# Patient Record
Sex: MALE | Race: WHITE | NOT HISPANIC OR LATINO | URBAN - METROPOLITAN AREA
[De-identification: names, ages, dates, MRNs, and addresses within clinical notes are randomized per-mention and may not be internally consistent; named-entity substitution may affect disease eponyms.]

---

## 2018-05-11 ENCOUNTER — EMERGENCY (EMERGENCY)
Facility: HOSPITAL | Age: 53
LOS: 0 days | Discharge: HOME | End: 2018-05-11
Attending: EMERGENCY MEDICINE | Admitting: EMERGENCY MEDICINE

## 2018-05-11 VITALS
HEART RATE: 52 BPM | OXYGEN SATURATION: 98 % | RESPIRATION RATE: 20 BRPM | SYSTOLIC BLOOD PRESSURE: 125 MMHG | TEMPERATURE: 97 F | DIASTOLIC BLOOD PRESSURE: 77 MMHG

## 2018-05-11 VITALS
SYSTOLIC BLOOD PRESSURE: 107 MMHG | RESPIRATION RATE: 18 BRPM | DIASTOLIC BLOOD PRESSURE: 75 MMHG | OXYGEN SATURATION: 100 % | HEART RATE: 78 BPM

## 2018-05-11 DIAGNOSIS — R07.9 CHEST PAIN, UNSPECIFIED: ICD-10-CM

## 2018-05-11 DIAGNOSIS — Z88.0 ALLERGY STATUS TO PENICILLIN: ICD-10-CM

## 2018-05-11 DIAGNOSIS — F17.200 NICOTINE DEPENDENCE, UNSPECIFIED, UNCOMPLICATED: ICD-10-CM

## 2018-05-11 DIAGNOSIS — Z98.890 OTHER SPECIFIED POSTPROCEDURAL STATES: ICD-10-CM

## 2018-05-11 DIAGNOSIS — Z98.890 OTHER SPECIFIED POSTPROCEDURAL STATES: Chronic | ICD-10-CM

## 2018-05-11 DIAGNOSIS — I48.91 UNSPECIFIED ATRIAL FIBRILLATION: ICD-10-CM

## 2018-05-11 LAB
ALBUMIN SERPL ELPH-MCNC: 4.8 G/DL — SIGNIFICANT CHANGE UP (ref 3.5–5.2)
ALP SERPL-CCNC: 63 U/L — SIGNIFICANT CHANGE UP (ref 30–115)
ALT FLD-CCNC: 18 U/L — SIGNIFICANT CHANGE UP (ref 0–41)
ANION GAP SERPL CALC-SCNC: 15 MMOL/L — HIGH (ref 7–14)
APTT BLD: 25.3 SEC — LOW (ref 27–39.2)
AST SERPL-CCNC: 16 U/L — SIGNIFICANT CHANGE UP (ref 0–41)
BASOPHILS # BLD AUTO: 0.03 K/UL — SIGNIFICANT CHANGE UP (ref 0–0.2)
BASOPHILS NFR BLD AUTO: 0.2 % — SIGNIFICANT CHANGE UP (ref 0–1)
BILIRUB SERPL-MCNC: 0.6 MG/DL — SIGNIFICANT CHANGE UP (ref 0.2–1.2)
BUN SERPL-MCNC: 14 MG/DL — SIGNIFICANT CHANGE UP (ref 10–20)
CALCIUM SERPL-MCNC: 9.9 MG/DL — SIGNIFICANT CHANGE UP (ref 8.5–10.1)
CHLORIDE SERPL-SCNC: 104 MMOL/L — SIGNIFICANT CHANGE UP (ref 98–110)
CK MB CFR SERPL CALC: 8 NG/ML — HIGH (ref 0.6–6.3)
CK SERPL-CCNC: 79 U/L — SIGNIFICANT CHANGE UP (ref 0–225)
CO2 SERPL-SCNC: 24 MMOL/L — SIGNIFICANT CHANGE UP (ref 17–32)
CREAT SERPL-MCNC: 0.9 MG/DL — SIGNIFICANT CHANGE UP (ref 0.7–1.5)
EOSINOPHIL # BLD AUTO: 0.05 K/UL — SIGNIFICANT CHANGE UP (ref 0–0.7)
EOSINOPHIL NFR BLD AUTO: 0.3 % — SIGNIFICANT CHANGE UP (ref 0–8)
GLUCOSE SERPL-MCNC: 113 MG/DL — HIGH (ref 70–99)
HCT VFR BLD CALC: 40 % — LOW (ref 42–52)
HGB BLD-MCNC: 13.4 G/DL — LOW (ref 14–18)
IMM GRANULOCYTES NFR BLD AUTO: 0.8 % — HIGH (ref 0.1–0.3)
INR BLD: 1.06 RATIO — SIGNIFICANT CHANGE UP (ref 0.65–1.3)
LYMPHOCYTES # BLD AUTO: 25.8 % — SIGNIFICANT CHANGE UP (ref 20.5–51.1)
LYMPHOCYTES # BLD AUTO: 3.83 K/UL — HIGH (ref 1.2–3.4)
MAGNESIUM SERPL-MCNC: 2 MG/DL — SIGNIFICANT CHANGE UP (ref 1.8–2.4)
MCHC RBC-ENTMCNC: 31 PG — SIGNIFICANT CHANGE UP (ref 27–31)
MCHC RBC-ENTMCNC: 33.5 G/DL — SIGNIFICANT CHANGE UP (ref 32–37)
MCV RBC AUTO: 92.6 FL — SIGNIFICANT CHANGE UP (ref 80–94)
MONOCYTES # BLD AUTO: 1.65 K/UL — HIGH (ref 0.1–0.6)
MONOCYTES NFR BLD AUTO: 11.1 % — HIGH (ref 1.7–9.3)
NEUTROPHILS # BLD AUTO: 9.14 K/UL — HIGH (ref 1.4–6.5)
NEUTROPHILS NFR BLD AUTO: 61.8 % — SIGNIFICANT CHANGE UP (ref 42.2–75.2)
PLATELET # BLD AUTO: 289 K/UL — SIGNIFICANT CHANGE UP (ref 130–400)
POTASSIUM SERPL-MCNC: 4.7 MMOL/L — SIGNIFICANT CHANGE UP (ref 3.5–5)
POTASSIUM SERPL-SCNC: 4.7 MMOL/L — SIGNIFICANT CHANGE UP (ref 3.5–5)
PROT SERPL-MCNC: 7.3 G/DL — SIGNIFICANT CHANGE UP (ref 6–8)
PROTHROM AB SERPL-ACNC: 11.5 SEC — SIGNIFICANT CHANGE UP (ref 9.95–12.87)
RBC # BLD: 4.32 M/UL — LOW (ref 4.7–6.1)
RBC # FLD: 14.4 % — SIGNIFICANT CHANGE UP (ref 11.5–14.5)
SODIUM SERPL-SCNC: 143 MMOL/L — SIGNIFICANT CHANGE UP (ref 135–146)
TROPONIN T SERPL-MCNC: <0.01 NG/ML — SIGNIFICANT CHANGE UP
WBC # BLD: 14.82 K/UL — HIGH (ref 4.8–10.8)
WBC # FLD AUTO: 14.82 K/UL — HIGH (ref 4.8–10.8)

## 2018-05-11 RX ORDER — ETOMIDATE 2 MG/ML
10 INJECTION INTRAVENOUS ONCE
Qty: 0 | Refills: 0 | Status: DISCONTINUED | OUTPATIENT
Start: 2018-05-11 | End: 2018-05-11

## 2018-05-11 RX ORDER — ETOMIDATE 2 MG/ML
14 INJECTION INTRAVENOUS ONCE
Qty: 0 | Refills: 0 | Status: DISCONTINUED | OUTPATIENT
Start: 2018-05-11 | End: 2018-05-11

## 2018-05-11 NOTE — ED PROCEDURE NOTE - NS_POSTPROCCAREGUIDE_ED_ALL_ED
Patient is now fully awake, with vital signs and temperature stable, hydration is adequate, patients Odilia’s  score is at baseline (or greater than 8), patient and escort has received  discharge education.

## 2018-05-11 NOTE — ED PROVIDER NOTE - NS ED ROS FT
Constitutional: No fever, chills.  Eyes: No visual changes.  ENT: No hearing changes. No sore throat.  Neck: No neck pain or stiffness.  Cardiovascular: No chest pain, edema. + palpitations.  Pulmonary: No SOB, cough. No hemoptysis.  Abdominal: No abdominal pain, nausea, vomiting, diarrhea.  : No dysuria, frequency.  Neuro: No headache, syncope, dizziness.  MS: No back pain. No calf pain/swelling.  Psych: No suicidal ideations.

## 2018-05-11 NOTE — ED PROVIDER NOTE - OBJECTIVE STATEMENT
Pt is a 54 y/o male with hx of afib in the past, s/p cardioversion x2, ablation in 2014 in [facility], presents to ED for palpitations that started 2 hours PTA. Pt states was walking down stairs when he felt heart rate became irregular, fast. No chest pain, SOB, dizziness, leg pain/swelling.

## 2018-05-11 NOTE — ED PROVIDER NOTE - ATTENDING CONTRIBUTION TO CARE
54 yo male h/o a.fibrillation s/p cardioversion  a few times in the past, s/p ablation about 4 years ago c/o rapid heart rate this morning,. Acute onset while carrying his dog, no associated symptoms.  Well-appearing, NAD, exam remarkable only for irregular H rate.  Will keep NPO, check electrolytes and likley cardiovert.  Patient is amenable with the plan.

## 2018-05-11 NOTE — ED PROVIDER NOTE - PROGRESS NOTE DETAILS
Case endorsed to Dr Griffin. Plan is labs, electrolytes, cardiovert. pt successfully cardioverted, HR 80s, sinus, /70, awake and alert, CHADSVASC 0, will d/c after perid of observation to f/u with his cardiologist Pt back to baseline. Ambulatory around ED. Will discharge with cards f/up.

## 2018-05-11 NOTE — ED ADULT NURSE REASSESSMENT NOTE - NS ED NURSE REASSESS COMMENT FT1
pt on continuous cardiac monitoring. A&O x 3. No s/sx of distress. Pacer pads in place. Will continue to monitor and assess.

## 2018-05-11 NOTE — ED PROVIDER NOTE - PHYSICAL EXAMINATION
Constitutional: Well developed, well nourished. NAD.  Head: Normocephalic, atraumatic.  Eyes: PERRL. EOMI.  ENT: No nasal discharge. Mucous membranes moist.  Neck: Supple. Painless ROM.  Cardiovascular: Normal S1, S2. Tachycardic, irregularly irregular. No murmurs, rubs, or gallops.  Pulmonary: Normal respiratory rate and effort. Lungs clear to auscultation bilaterally. No wheezing, rales, or rhonchi.  Abdominal: Soft. Nondistended. Nontender. No rebound, guarding, rigidity.  Extremities. Pelvis stable. No lower extremity edema, symmetric calves.  Skin: No rashes, cyanosis.  Neuro: AAOx3. No focal neurological deficits.  Psych: Normal mood. Normal affect.

## 2018-08-01 ENCOUNTER — INPATIENT (INPATIENT)
Facility: HOSPITAL | Age: 53
LOS: 11 days | Discharge: HOME | End: 2018-08-13
Attending: THORACIC SURGERY (CARDIOTHORACIC VASCULAR SURGERY) | Admitting: THORACIC SURGERY (CARDIOTHORACIC VASCULAR SURGERY)
Payer: COMMERCIAL

## 2018-08-01 VITALS
RESPIRATION RATE: 18 BRPM | OXYGEN SATURATION: 99 % | HEIGHT: 74 IN | HEART RATE: 84 BPM | WEIGHT: 210.1 LBS | TEMPERATURE: 97 F | SYSTOLIC BLOOD PRESSURE: 113 MMHG | DIASTOLIC BLOOD PRESSURE: 75 MMHG

## 2018-08-01 DIAGNOSIS — Z98.890 OTHER SPECIFIED POSTPROCEDURAL STATES: Chronic | ICD-10-CM

## 2018-08-01 DIAGNOSIS — N17.9 ACUTE KIDNEY FAILURE, UNSPECIFIED: ICD-10-CM

## 2018-08-01 DIAGNOSIS — I42.9 CARDIOMYOPATHY, UNSPECIFIED: ICD-10-CM

## 2018-08-01 DIAGNOSIS — F17.210 NICOTINE DEPENDENCE, CIGARETTES, UNCOMPLICATED: ICD-10-CM

## 2018-08-01 DIAGNOSIS — I11.0 HYPERTENSIVE HEART DISEASE WITH HEART FAILURE: ICD-10-CM

## 2018-08-01 DIAGNOSIS — R07.89 OTHER CHEST PAIN: ICD-10-CM

## 2018-08-01 DIAGNOSIS — Z82.49 FAMILY HISTORY OF ISCHEMIC HEART DISEASE AND OTHER DISEASES OF THE CIRCULATORY SYSTEM: ICD-10-CM

## 2018-08-01 DIAGNOSIS — Z88.0 ALLERGY STATUS TO PENICILLIN: ICD-10-CM

## 2018-08-01 DIAGNOSIS — E87.70 FLUID OVERLOAD, UNSPECIFIED: ICD-10-CM

## 2018-08-01 DIAGNOSIS — I48.0 PAROXYSMAL ATRIAL FIBRILLATION: ICD-10-CM

## 2018-08-01 DIAGNOSIS — R09.02 HYPOXEMIA: ICD-10-CM

## 2018-08-01 DIAGNOSIS — J44.9 CHRONIC OBSTRUCTIVE PULMONARY DISEASE, UNSPECIFIED: ICD-10-CM

## 2018-08-01 DIAGNOSIS — I08.2 RHEUMATIC DISORDERS OF BOTH AORTIC AND TRICUSPID VALVES: ICD-10-CM

## 2018-08-01 DIAGNOSIS — I27.20 PULMONARY HYPERTENSION, UNSPECIFIED: ICD-10-CM

## 2018-08-01 DIAGNOSIS — I50.23 ACUTE ON CHRONIC SYSTOLIC (CONGESTIVE) HEART FAILURE: ICD-10-CM

## 2018-08-01 DIAGNOSIS — E11.9 TYPE 2 DIABETES MELLITUS WITHOUT COMPLICATIONS: ICD-10-CM

## 2018-08-01 DIAGNOSIS — D62 ACUTE POSTHEMORRHAGIC ANEMIA: ICD-10-CM

## 2018-08-01 PROBLEM — I48.91 UNSPECIFIED ATRIAL FIBRILLATION: Chronic | Status: ACTIVE | Noted: 2018-05-11

## 2018-08-01 LAB
ALBUMIN SERPL ELPH-MCNC: 4 G/DL — SIGNIFICANT CHANGE UP (ref 3.5–5.2)
ALP SERPL-CCNC: 67 U/L — SIGNIFICANT CHANGE UP (ref 30–115)
ALT FLD-CCNC: 34 U/L — SIGNIFICANT CHANGE UP (ref 0–41)
ANION GAP SERPL CALC-SCNC: 15 MMOL/L — HIGH (ref 7–14)
APTT BLD: 30.2 SEC — SIGNIFICANT CHANGE UP (ref 27–39.2)
AST SERPL-CCNC: 22 U/L — SIGNIFICANT CHANGE UP (ref 0–41)
BASOPHILS # BLD AUTO: 0.03 K/UL — SIGNIFICANT CHANGE UP (ref 0–0.2)
BASOPHILS NFR BLD AUTO: 0.3 % — SIGNIFICANT CHANGE UP (ref 0–1)
BILIRUB SERPL-MCNC: 1.4 MG/DL — HIGH (ref 0.2–1.2)
BLD GP AB SCN SERPL QL: SIGNIFICANT CHANGE UP
BUN SERPL-MCNC: 11 MG/DL — SIGNIFICANT CHANGE UP (ref 10–20)
CALCIUM SERPL-MCNC: 8.9 MG/DL — SIGNIFICANT CHANGE UP (ref 8.5–10.1)
CHLORIDE SERPL-SCNC: 104 MMOL/L — SIGNIFICANT CHANGE UP (ref 98–110)
CK MB CFR SERPL CALC: 3.8 NG/ML — SIGNIFICANT CHANGE UP (ref 0.6–6.3)
CK SERPL-CCNC: 62 U/L — SIGNIFICANT CHANGE UP (ref 0–225)
CK SERPL-CCNC: 67 U/L — SIGNIFICANT CHANGE UP (ref 0–225)
CO2 SERPL-SCNC: 20 MMOL/L — SIGNIFICANT CHANGE UP (ref 17–32)
CREAT SERPL-MCNC: 0.9 MG/DL — SIGNIFICANT CHANGE UP (ref 0.7–1.5)
EOSINOPHIL # BLD AUTO: 0.15 K/UL — SIGNIFICANT CHANGE UP (ref 0–0.7)
EOSINOPHIL NFR BLD AUTO: 1.5 % — SIGNIFICANT CHANGE UP (ref 0–8)
GLUCOSE SERPL-MCNC: 98 MG/DL — SIGNIFICANT CHANGE UP (ref 70–99)
HCT VFR BLD CALC: 39.4 % — LOW (ref 42–52)
HGB BLD-MCNC: 13.1 G/DL — LOW (ref 14–18)
IMM GRANULOCYTES NFR BLD AUTO: 0.4 % — HIGH (ref 0.1–0.3)
INR BLD: 1.3 RATIO — SIGNIFICANT CHANGE UP (ref 0.65–1.3)
LACTATE BLDV-MCNC: 1 MMOL/L — SIGNIFICANT CHANGE UP (ref 0.5–1.6)
LYMPHOCYTES # BLD AUTO: 2.49 K/UL — SIGNIFICANT CHANGE UP (ref 1.2–3.4)
LYMPHOCYTES # BLD AUTO: 24.3 % — SIGNIFICANT CHANGE UP (ref 20.5–51.1)
MAGNESIUM SERPL-MCNC: 1.9 MG/DL — SIGNIFICANT CHANGE UP (ref 1.8–2.4)
MCHC RBC-ENTMCNC: 31 PG — SIGNIFICANT CHANGE UP (ref 27–31)
MCHC RBC-ENTMCNC: 33.2 G/DL — SIGNIFICANT CHANGE UP (ref 32–37)
MCV RBC AUTO: 93.4 FL — SIGNIFICANT CHANGE UP (ref 80–94)
MONOCYTES # BLD AUTO: 1.08 K/UL — HIGH (ref 0.1–0.6)
MONOCYTES NFR BLD AUTO: 10.6 % — HIGH (ref 1.7–9.3)
NEUTROPHILS # BLD AUTO: 6.44 K/UL — SIGNIFICANT CHANGE UP (ref 1.4–6.5)
NEUTROPHILS NFR BLD AUTO: 62.9 % — SIGNIFICANT CHANGE UP (ref 42.2–75.2)
NRBC # BLD: 0 /100 WBCS — SIGNIFICANT CHANGE UP (ref 0–0)
PLATELET # BLD AUTO: 249 K/UL — SIGNIFICANT CHANGE UP (ref 130–400)
POTASSIUM SERPL-MCNC: 4.3 MMOL/L — SIGNIFICANT CHANGE UP (ref 3.5–5)
POTASSIUM SERPL-SCNC: 4.3 MMOL/L — SIGNIFICANT CHANGE UP (ref 3.5–5)
PROT SERPL-MCNC: 6.4 G/DL — SIGNIFICANT CHANGE UP (ref 6–8)
PROTHROM AB SERPL-ACNC: 14 SEC — HIGH (ref 9.95–12.87)
RBC # BLD: 4.22 M/UL — LOW (ref 4.7–6.1)
RBC # FLD: 13.7 % — SIGNIFICANT CHANGE UP (ref 11.5–14.5)
SODIUM SERPL-SCNC: 139 MMOL/L — SIGNIFICANT CHANGE UP (ref 135–146)
TROPONIN T SERPL-MCNC: 0.05 NG/ML — CRITICAL HIGH
TROPONIN T SERPL-MCNC: 0.05 NG/ML — CRITICAL HIGH
TYPE + AB SCN PNL BLD: SIGNIFICANT CHANGE UP
WBC # BLD: 10.23 K/UL — SIGNIFICANT CHANGE UP (ref 4.8–10.8)
WBC # FLD AUTO: 10.23 K/UL — SIGNIFICANT CHANGE UP (ref 4.8–10.8)

## 2018-08-01 RX ORDER — METOPROLOL TARTRATE 50 MG
12.5 TABLET ORAL EVERY 12 HOURS
Qty: 0 | Refills: 0 | Status: DISCONTINUED | OUTPATIENT
Start: 2018-08-01 | End: 2018-08-05

## 2018-08-01 RX ORDER — HEPARIN SODIUM 5000 [USP'U]/ML
1000 INJECTION INTRAVENOUS; SUBCUTANEOUS
Qty: 25000 | Refills: 0 | Status: DISCONTINUED | OUTPATIENT
Start: 2018-08-01 | End: 2018-08-02

## 2018-08-01 RX ORDER — CLOPIDOGREL BISULFATE 75 MG/1
75 TABLET, FILM COATED ORAL DAILY
Qty: 0 | Refills: 0 | Status: DISCONTINUED | OUTPATIENT
Start: 2018-08-01 | End: 2018-08-02

## 2018-08-01 RX ORDER — ATORVASTATIN CALCIUM 80 MG/1
80 TABLET, FILM COATED ORAL ONCE
Qty: 0 | Refills: 0 | Status: COMPLETED | OUTPATIENT
Start: 2018-08-01 | End: 2018-08-01

## 2018-08-01 RX ORDER — ATORVASTATIN CALCIUM 80 MG/1
80 TABLET, FILM COATED ORAL AT BEDTIME
Qty: 0 | Refills: 0 | Status: DISCONTINUED | OUTPATIENT
Start: 2018-08-01 | End: 2018-08-08

## 2018-08-01 RX ORDER — ASPIRIN/CALCIUM CARB/MAGNESIUM 324 MG
324 TABLET ORAL ONCE
Qty: 0 | Refills: 0 | Status: COMPLETED | OUTPATIENT
Start: 2018-08-01 | End: 2018-08-01

## 2018-08-01 RX ORDER — HEPARIN SODIUM 5000 [USP'U]/ML
INJECTION INTRAVENOUS; SUBCUTANEOUS
Qty: 25000 | Refills: 0 | Status: DISCONTINUED | OUTPATIENT
Start: 2018-08-01 | End: 2018-08-01

## 2018-08-01 RX ORDER — CLOPIDOGREL BISULFATE 75 MG/1
300 TABLET, FILM COATED ORAL ONCE
Qty: 0 | Refills: 0 | Status: COMPLETED | OUTPATIENT
Start: 2018-08-01 | End: 2018-08-01

## 2018-08-01 RX ORDER — HEPARIN SODIUM 5000 [USP'U]/ML
5000 INJECTION INTRAVENOUS; SUBCUTANEOUS ONCE
Qty: 0 | Refills: 0 | Status: COMPLETED | OUTPATIENT
Start: 2018-08-01 | End: 2018-08-01

## 2018-08-01 RX ORDER — ASPIRIN/CALCIUM CARB/MAGNESIUM 324 MG
81 TABLET ORAL DAILY
Qty: 0 | Refills: 0 | Status: DISCONTINUED | OUTPATIENT
Start: 2018-08-01 | End: 2018-08-08

## 2018-08-01 RX ADMIN — ATORVASTATIN CALCIUM 80 MILLIGRAM(S): 80 TABLET, FILM COATED ORAL at 21:35

## 2018-08-01 RX ADMIN — HEPARIN SODIUM 10 UNIT(S)/HR: 5000 INJECTION INTRAVENOUS; SUBCUTANEOUS at 21:37

## 2018-08-01 RX ADMIN — HEPARIN SODIUM 1000 UNIT(S)/HR: 5000 INJECTION INTRAVENOUS; SUBCUTANEOUS at 16:24

## 2018-08-01 RX ADMIN — HEPARIN SODIUM 5000 UNIT(S): 5000 INJECTION INTRAVENOUS; SUBCUTANEOUS at 16:24

## 2018-08-01 RX ADMIN — ATORVASTATIN CALCIUM 80 MILLIGRAM(S): 80 TABLET, FILM COATED ORAL at 17:35

## 2018-08-01 RX ADMIN — CLOPIDOGREL BISULFATE 300 MILLIGRAM(S): 75 TABLET, FILM COATED ORAL at 17:35

## 2018-08-01 RX ADMIN — Medication 324 MILLIGRAM(S): at 14:42

## 2018-08-01 NOTE — ED PROVIDER NOTE - PROGRESS NOTE DETAILS
Cardiology at bedside. STEMI code cancelled. If positive cardiac enzymes, will place on heparin drip. Either way, will be placed on cath schedule for tomorrow.

## 2018-08-01 NOTE — H&P ADULT - HISTORY OF PRESENT ILLNESS
This is a 52 YO M with PMH AFib s/p cardioversion in the ED in May, rheumatic fever as a child, and valve disease (patient unaware of the specifics, but has been told by cardiologist his valve was "calcified"), who presents with shortness of breath for the last few days to week with exertion, as well as intermittent mild chest discomfort with exertion as well, which patient initially attributed to gas pain. He underwent bedside cardioversion in the ED May 11th and was DC'd home to follow up with his cardiologist, but was not given any medications since his CHADS-Vasc was 0. When he went to cardio office today he was sent to the ED due to his symptoms and ECG that showed V4-V6 TWI and elevation in AVR. In ED STEMI code was called but then cancelled when prior ECGs were seen to be similar and was determined to be LVH with S-T repolarization abnormalities.   Currently denies any symptoms.    ICU Vital Signs Last 24 Hrs  T(C): 36.7 (01 Aug 2018 18:04), Max: 36.7 (01 Aug 2018 18:04)  T(F): 98 (01 Aug 2018 18:04), Max: 98 (01 Aug 2018 18:04)  HR: 85 (01 Aug 2018 18:04) (84 - 85)  BP: 113/73 (01 Aug 2018 18:04) (113/73 - 113/75)  BP(mean): --  ABP: --  ABP(mean): --  RR: 19 (01 Aug 2018 18:04) (18 - 19)  SpO2: 100% (01 Aug 2018 18:04) (99% - 100%) This is a 52 YO M with PMH AFib s/p cardioversion in the ED in May and ablation years ago, rheumatic fever as a child, and valve disease (patient unaware of the specifics, but has been told by a physician his valve was "calcified"), who presents with shortness of breath for the last few days to week with exertion, as well as intermittent mild chest discomfort also with exertion, which patient initially attributed to gas pain. He underwent bedside cardioversion in the ED May 11th and was DC'd home to follow up with his cardiologist, but was not given any medications since his CHADS-Vasc was 0. When he went to cardio office today he was sent to the ED due to his symptoms and ECG that showed V4-V6 TWI and elevation in AVR. In ED STEMI code was called but then cancelled when prior ECGs were seen to be similar and was determined to be LVH with S-T repolarization abnormalities. Of note, patient reports he has had a negative stress test in the past as well.   Currently denies any symptoms, resting comfortably in bed.    ICU Vital Signs Last 24 Hrs  T(C): 36.7 (01 Aug 2018 18:04), Max: 36.7 (01 Aug 2018 18:04)  T(F): 98 (01 Aug 2018 18:04), Max: 98 (01 Aug 2018 18:04)  HR: 85 (01 Aug 2018 18:04) (84 - 85)  BP: 113/73 (01 Aug 2018 18:04) (113/73 - 113/75)  BP(mean): --  ABP: --  ABP(mean): --  RR: 19 (01 Aug 2018 18:04) (18 - 19)  SpO2: 100% (01 Aug 2018 18:04) (99% - 100%)

## 2018-08-01 NOTE — ED PROVIDER NOTE - INTERPRETATION
LVH, early repol vs elevation in avr/v1 with depressions in v5/v6 possible nml strain pattern/abnormal

## 2018-08-01 NOTE — ED PROVIDER NOTE - NS ED ROS FT
Constitutional:  See HPI.  Eyes:  No visual changes, eye pain or discharge.  ENMT:  No hearing changes, pain, discharge or infections. No neck pain or stiffness.  Cardiac:  See HPI.  Respiratory:  See HPI.  GI:  No nausea, vomiting, diarrhea or abdominal pain.  :  No dysuria, frequency or burning.  MS:  No myalgia, muscle weakness, joint pain or back pain.  Neuro:  No headache or weakness.  No LOC.  Skin:  No skin rash.   Except as documented in the HPI,  all other systems are negative.

## 2018-08-01 NOTE — ED PROVIDER NOTE - OBJECTIVE STATEMENT
Patient is a 52 y/o male w/ pmhx of a fib s/p ablation years ago with no recurrence, who presents to the ED complaining of chest pain. Patient states that pain started on exertion, currently has no chest pain. Had negative stress test years ago. Intermittent shortness of breath, went to his cardiologist Dr. Cook today for evaluation and was referred to the ED. No other complaints.

## 2018-08-01 NOTE — H&P ADULT - NSHPREVIEWOFSYSTEMS_GEN_ALL_CORE
REVIEW OF SYSTEMS:    CONSTITUTIONAL: No weakness, fevers or chills  EYES/ENT: No visual changes;  No vertigo or throat pain   NECK: No pain or stiffness  RESPIRATORY: See HPI  CARDIOVASCULAR: See HPI  GASTROINTESTINAL: No abdominal or epigastric pain. No nausea, vomiting, or hematemesis; No diarrhea or constipation. No melena or hematochezia.  GENITOURINARY: No dysuria, frequency or hematuria  NEUROLOGICAL: No numbness or weakness  MUSCULOSKELETAL: No muscle or joint pain, stiffness, or swelling  SKIN: No itching, rashes

## 2018-08-01 NOTE — CONSULT NOTE ADULT - ASSESSMENT
Stable angina (class III)  - EKG unchanged, symptoms not occurring at rest.   - admit to telemetry  - aspirin 324, then 81 mg po daily  - high intensity statin  - start beta blockade at 12.5mg tartrate po q12h  - 2d echo to assess for LVH/ ?HOCM/ rheumatic valvular disease  - NPO after midnight for cath   - d/w Dr. García over the phone Unstable angina (class III)  - EKG unchanged, symptoms not occurring at rest.   - admit to CCU  - aspirin 324, then 81 mg po daily  - plavix 300, then 75 daily  - heparin gtt  - high intensity statin  - start beta blockade at 12.5mg tartrate po q12h  - 2d echo to assess for LVH/ ?HOCM/ rheumatic valvular disease  - NPO after midnight for cath   - d/w Dr. García over the phone

## 2018-08-01 NOTE — H&P ADULT - PSH
H/O left knee surgery    H/O prior ablation treatment    H/O right knee surgery    History of cardioversion

## 2018-08-01 NOTE — H&P ADULT - ASSESSMENT
52 YO M with Hx of AFib s/p cardioversion and ablation, rheumatic fever as a child, presents for OCONNOR and intermittent chest pain with exertion. Mild troponin elevation 0.05. Started on heparin drip, ASA, Plavix, BB, and plan for cath tomorrow per Dr. Muñoz.    1) SOB 2/2 NSTEMI/Stable Angina vs worsening valvular disease 2/2 rheumatic fever/calcification  - CCU monitoring  - Heparin drip, keep PTT 50-70  - c/w ASA, Plavix, BB, High intensity statin  - NPO past midnight for catheterization in AM  - FU TTE  - FU Cardio plan    2) PPx/Diet/Dispo  - On heparin  - Heart healthy diet then NPO  - Full code, from home, DC home when medically stable 54 YO M with Hx of AFib s/p cardioversion and ablation, rheumatic fever as a child, presents for OCONNOR and intermittent chest pain with exertion. Mild troponin elevation 0.05. Started on heparin drip, ASA, Plavix, BB, and plan for cath tomorrow per Dr. Muñoz.    1) SOB 2/2 NSTEMI/Stable Angina vs worsening valvular disease 2/2 rheumatic fever/calcification  - CCU monitoring  - Heparin drip, keep PTT 50-70  - FU Repeat cardiac enzymes, ECGs  - c/w ASA, Plavix, BB, High intensity statin  - NPO past midnight for catheterization in AM  - FU TTE  - FU Cardio plan    2) PPx/Diet/Dispo  - On heparin  - Heart healthy diet then NPO  - Full code, from home, DC home when medically stable

## 2018-08-01 NOTE — ED ADULT NURSE NOTE - NSIMPLEMENTINTERV_GEN_ALL_ED
Implemented All Universal Safety Interventions:  [place] to call system. Call bell, personal items and telephone within reach. Instruct patient to call for assistance. Room bathroom lighting operational. Non-slip footwear when patient is off stretcher. Physically safe environment: no spills, clutter or unnecessary equipment. Stretcher in lowest position, wheels locked, appropriate side rails in place.

## 2018-08-01 NOTE — H&P ADULT - ATTENDING COMMENTS
Pt seen and examined independently, I have read and agree with above exam and poa  · Assessment    54 YO M with Hx of AFib s/p cardioversion and ablation, rheumatic fever as a child, presents for OCONNOR and intermittent chest pain with exertion. Mild troponin elevation 0.05. Started on heparin drip, ASA, Plavix, BB, and plan for cath tomorrow per Dr. Muñoz.    1) SOB 2/2 NSTEMI/Stable Angina vs worsening valvular disease 2/2 rheumatic fever/calcification  - CCU monitoring  - Heparin drip, keep PTT 50-70  - FU Repeat cardiac enzymes, ECGs  - c/w ASA, Plavix, BB, High intensity statin  - NPO past midnight for catheterization in AM  - FU TTE  - FU Cardio plan    2) PPx/Diet/Dispo  - On heparin  - Heart healthy diet then NPO  - Full code, from home, DC home when medically stable

## 2018-08-01 NOTE — ED PROVIDER NOTE - ATTENDING CONTRIBUTION TO CARE
chest pressure that is left sided that is exertional with sob, no hx of HTN, only history of afib for which he has been cardioverted, his ekg iis sinus and appears LVH but given he has no hx of htn and his symptosm are so classic he does have elevation in avr and v1 with depressions in v5-v6 which appear to be early repol possibly, given this STEMI code was called.

## 2018-08-01 NOTE — H&P ADULT - NSHPLABSRESULTS_GEN_ALL_CORE
13.1   10.23 )-----------( 249      ( 01 Aug 2018 14:14 )             39.4       08-01    139  |  104  |  11  ----------------------------<  98  4.3   |  20  |  0.9    Ca    8.9      01 Aug 2018 14:14  Mg     1.9     08-01    TPro  6.4  /  Alb  4.0  /  TBili  1.4<H>  /  DBili  x   /  AST  22  /  ALT  34  /  AlkPhos  67  08-01            PT/INR - ( 01 Aug 2018 14:20 )   PT: 14.00 sec;   INR: 1.30 ratio         PTT - ( 01 Aug 2018 14:20 )  PTT:30.2 sec      CARDIAC MARKERS ( 01 Aug 2018 14:14 )  x     / 0.05 ng/mL / 67 U/L / x     / x            CAPILLARY BLOOD GLUCOSE      14:19 - VBG - pH:       | pCO2:       | pO2:       | Lactate: 1.0      < from: 12 Lead ECG (08.01.18 @ 14:02) >    Ventricular Rate 81 BPM    Atrial Rate 81 BPM    P-R Interval 164 ms    QRS Duration 102 ms    Q-T Interval 410 ms    QTC Calculation(Bezet) 476 ms    P Axis 44 degrees    R Axis 34 degrees    T Axis 185 degrees    Diagnosis Line Normal sinus rhythm  Left ventricular hypertrophy with repolarization abnormality  Abnormal ECG    < end of copied text >

## 2018-08-01 NOTE — ED PROVIDER NOTE - PHYSICAL EXAMINATION
CONSTITUTIONAL: Well-appearing; well-nourished; in no apparent distress.   HEAD: Normocephalic; atraumatic.   EYES: PERRL; EOM intact. Conjunctiva normal B/L.   ENT: Normal pharynx with no tonsillar hypertrophy. MMM.  NECK: Supple; non-tender; no cervical lymphadenopathy.   CHEST: Normal chest excursion with respiration.   CARDIOVASCULAR: Normal S1, S2; no murmurs, rubs, or gallops.   RESPIRATORY: Normal chest excursion with respiration; breath sounds clear and equal bilaterally; no wheezes, rhonchi, or rales.  GI/: Normal bowel sounds; non-distended; non-tender.  BACK: No evidence of trauma or deformity. Non-tender to palpation. No CVA tenderness.   EXT: Normal ROM in all four extremities; non-tender to palpation; distal pulses are normal. Warm, no leg edema B/L.   SKIN: Normal for age and race; warm; dry; good turgor.  NEURO: A & O x 3; CN 2-12 intact. Grossly unremarkable.

## 2018-08-01 NOTE — H&P ADULT - FAMILY HISTORY
Father  Still living? No  Family history of heart disease, Age at diagnosis: 51-60     Grandparent  Still living? No  Family history of heart disease, Age at diagnosis: 61-70

## 2018-08-02 LAB
ALBUMIN SERPL ELPH-MCNC: 3.8 G/DL — SIGNIFICANT CHANGE UP (ref 3.5–5.2)
ALP SERPL-CCNC: 62 U/L — SIGNIFICANT CHANGE UP (ref 30–115)
ALT FLD-CCNC: 30 U/L — SIGNIFICANT CHANGE UP (ref 0–41)
ANION GAP SERPL CALC-SCNC: 14 MMOL/L — SIGNIFICANT CHANGE UP (ref 7–14)
APTT BLD: 32.8 SEC — SIGNIFICANT CHANGE UP (ref 27–39.2)
APTT BLD: 35.6 SEC — SIGNIFICANT CHANGE UP (ref 27–39.2)
AST SERPL-CCNC: 22 U/L — SIGNIFICANT CHANGE UP (ref 0–41)
BASOPHILS # BLD AUTO: 0.03 K/UL — SIGNIFICANT CHANGE UP (ref 0–0.2)
BASOPHILS NFR BLD AUTO: 0.3 % — SIGNIFICANT CHANGE UP (ref 0–1)
BILIRUB DIRECT SERPL-MCNC: 0.5 MG/DL — HIGH (ref 0–0.2)
BILIRUB INDIRECT FLD-MCNC: 1 MG/DL — SIGNIFICANT CHANGE UP (ref 0.2–1.2)
BILIRUB SERPL-MCNC: 1.5 MG/DL — HIGH (ref 0.2–1.2)
BUN SERPL-MCNC: 13 MG/DL — SIGNIFICANT CHANGE UP (ref 10–20)
CALCIUM SERPL-MCNC: 8.8 MG/DL — SIGNIFICANT CHANGE UP (ref 8.5–10.1)
CHLORIDE SERPL-SCNC: 107 MMOL/L — SIGNIFICANT CHANGE UP (ref 98–110)
CK MB CFR SERPL CALC: 7.4 NG/ML — HIGH (ref 0.6–6.3)
CK SERPL-CCNC: 75 U/L — SIGNIFICANT CHANGE UP (ref 0–225)
CO2 SERPL-SCNC: 20 MMOL/L — SIGNIFICANT CHANGE UP (ref 17–32)
CREAT SERPL-MCNC: 0.8 MG/DL — SIGNIFICANT CHANGE UP (ref 0.7–1.5)
EOSINOPHIL # BLD AUTO: 0.16 K/UL — SIGNIFICANT CHANGE UP (ref 0–0.7)
EOSINOPHIL NFR BLD AUTO: 1.7 % — SIGNIFICANT CHANGE UP (ref 0–8)
GLUCOSE SERPL-MCNC: 105 MG/DL — HIGH (ref 70–99)
HCT VFR BLD CALC: 36.7 % — LOW (ref 42–52)
HGB BLD-MCNC: 12.4 G/DL — LOW (ref 14–18)
IMM GRANULOCYTES NFR BLD AUTO: 0.4 % — HIGH (ref 0.1–0.3)
INR BLD: 1.47 RATIO — HIGH (ref 0.65–1.3)
LYMPHOCYTES # BLD AUTO: 2.01 K/UL — SIGNIFICANT CHANGE UP (ref 1.2–3.4)
LYMPHOCYTES # BLD AUTO: 21.5 % — SIGNIFICANT CHANGE UP (ref 20.5–51.1)
MAGNESIUM SERPL-MCNC: 2 MG/DL — SIGNIFICANT CHANGE UP (ref 1.8–2.4)
MCHC RBC-ENTMCNC: 31.7 PG — HIGH (ref 27–31)
MCHC RBC-ENTMCNC: 33.8 G/DL — SIGNIFICANT CHANGE UP (ref 32–37)
MCV RBC AUTO: 93.9 FL — SIGNIFICANT CHANGE UP (ref 80–94)
MONOCYTES # BLD AUTO: 0.92 K/UL — HIGH (ref 0.1–0.6)
MONOCYTES NFR BLD AUTO: 9.9 % — HIGH (ref 1.7–9.3)
NEUTROPHILS # BLD AUTO: 6.18 K/UL — SIGNIFICANT CHANGE UP (ref 1.4–6.5)
NEUTROPHILS NFR BLD AUTO: 66.2 % — SIGNIFICANT CHANGE UP (ref 42.2–75.2)
PLATELET # BLD AUTO: 211 K/UL — SIGNIFICANT CHANGE UP (ref 130–400)
POTASSIUM SERPL-MCNC: 4.1 MMOL/L — SIGNIFICANT CHANGE UP (ref 3.5–5)
POTASSIUM SERPL-SCNC: 4.1 MMOL/L — SIGNIFICANT CHANGE UP (ref 3.5–5)
PROT SERPL-MCNC: 5.8 G/DL — LOW (ref 6–8)
PROTHROM AB SERPL-ACNC: 15.8 SEC — HIGH (ref 9.95–12.87)
RBC # BLD: 3.91 M/UL — LOW (ref 4.7–6.1)
RBC # FLD: 14 % — SIGNIFICANT CHANGE UP (ref 11.5–14.5)
SODIUM SERPL-SCNC: 141 MMOL/L — SIGNIFICANT CHANGE UP (ref 135–146)
TROPONIN T SERPL-MCNC: 0.1 NG/ML — CRITICAL HIGH
WBC # BLD: 9.34 K/UL — SIGNIFICANT CHANGE UP (ref 4.8–10.8)
WBC # FLD AUTO: 9.34 K/UL — SIGNIFICANT CHANGE UP (ref 4.8–10.8)

## 2018-08-02 PROCEDURE — 93880 EXTRACRANIAL BILAT STUDY: CPT | Mod: 26

## 2018-08-02 RX ORDER — ENOXAPARIN SODIUM 100 MG/ML
40 INJECTION SUBCUTANEOUS EVERY 24 HOURS
Qty: 0 | Refills: 0 | Status: DISCONTINUED | OUTPATIENT
Start: 2018-08-02 | End: 2018-08-06

## 2018-08-02 RX ORDER — HEPARIN SODIUM 5000 [USP'U]/ML
1300 INJECTION INTRAVENOUS; SUBCUTANEOUS
Qty: 25000 | Refills: 0 | Status: DISCONTINUED | OUTPATIENT
Start: 2018-08-02 | End: 2018-08-02

## 2018-08-02 RX ORDER — FUROSEMIDE 40 MG
40 TABLET ORAL ONCE
Qty: 0 | Refills: 0 | Status: COMPLETED | OUTPATIENT
Start: 2018-08-02 | End: 2018-08-02

## 2018-08-02 RX ORDER — ACETAMINOPHEN 500 MG
650 TABLET ORAL EVERY 6 HOURS
Qty: 0 | Refills: 0 | Status: DISCONTINUED | OUTPATIENT
Start: 2018-08-02 | End: 2018-08-08

## 2018-08-02 RX ORDER — FUROSEMIDE 40 MG
40 TABLET ORAL DAILY
Qty: 0 | Refills: 0 | Status: DISCONTINUED | OUTPATIENT
Start: 2018-08-02 | End: 2018-08-05

## 2018-08-02 RX ORDER — HEPARIN SODIUM 5000 [USP'U]/ML
6000 INJECTION INTRAVENOUS; SUBCUTANEOUS ONCE
Qty: 0 | Refills: 0 | Status: COMPLETED | OUTPATIENT
Start: 2018-08-02 | End: 2018-08-02

## 2018-08-02 RX ADMIN — Medication 650 MILLIGRAM(S): at 18:29

## 2018-08-02 RX ADMIN — Medication 40 MILLIGRAM(S): at 14:53

## 2018-08-02 RX ADMIN — HEPARIN SODIUM 13 UNIT(S)/HR: 5000 INJECTION INTRAVENOUS; SUBCUTANEOUS at 02:00

## 2018-08-02 RX ADMIN — ENOXAPARIN SODIUM 40 MILLIGRAM(S): 100 INJECTION SUBCUTANEOUS at 18:04

## 2018-08-02 RX ADMIN — Medication 40 MILLIGRAM(S): at 20:00

## 2018-08-02 RX ADMIN — Medication 12.5 MILLIGRAM(S): at 05:45

## 2018-08-02 RX ADMIN — HEPARIN SODIUM 6000 UNIT(S): 5000 INJECTION INTRAVENOUS; SUBCUTANEOUS at 02:00

## 2018-08-02 RX ADMIN — ATORVASTATIN CALCIUM 80 MILLIGRAM(S): 80 TABLET, FILM COATED ORAL at 21:33

## 2018-08-02 RX ADMIN — Medication 12.5 MILLIGRAM(S): at 18:05

## 2018-08-02 RX ADMIN — Medication 81 MILLIGRAM(S): at 11:04

## 2018-08-02 RX ADMIN — CLOPIDOGREL BISULFATE 75 MILLIGRAM(S): 75 TABLET, FILM COATED ORAL at 11:03

## 2018-08-02 NOTE — CHART NOTE - NSCHARTNOTEFT_GEN_A_CORE
PRE-OP DIAGNOSIS: AS/CM    PROCEDURE: l/rhc, sca    Physician: marilee  Assistant: angelina    ANESTHESIA TYPE:  [  ]General Anesthesia  [ x ] Sedation  [ x ] Local/Regional    ESTIMATED BLOOD LOSS:   10    mL    CONDITION  [  ] Critical  [  ] Serious  [x  ]Fair  [  ]Good      SPECIMENS REMOVED (IF APPLICABLE):      IV CONTRAST:       30      mL      IMPLANTS (IF APPLICABLE)      FINDINGS    Left Heart Catheterization:  LVEF%:  LVEDP: 35  [x ] Normal Coronary Arteries  [ ] Luminal Irregularities  [ ] Non-obstructive CAD          RIGHT HEART CATHETERIZATION  PA: mean 58  PCW: 30  CO/CI: 5.2      POST-OP DIAGNOSIS    Critical AS/Severe CM/PHTN        PLAN OF CARE  [ ] D/C Home today  [ ]  D/C in AM  [ x] Return to In-patient bed  [ ] Admit for observation  [ ] Return for staged procedure:  [x ] CT Surgery consult  [ ]  Continue DAPT, B-blocker & Statin therapy

## 2018-08-02 NOTE — CONSULT NOTE ADULT - ATTENDING COMMENTS
Reviewed all images. Needs high risk AVR possible Maze and JUJU occlusion. Risk of death, stroke, pacemaker explained. Needs optomization prior to surgery as PA press v high and in florid CHF. Also received plavix. Not sure whether mechanical or bioprosthetic valve.

## 2018-08-02 NOTE — PROGRESS NOTE ADULT - SUBJECTIVE AND OBJECTIVE BOX
SUBJECTIVE:    Patient is a 53y old Male who presents with a chief complaint of Shortness of breath with exertion (01 Aug 2018 20:16)    Currently admitted to medicine with the primary diagnosis of NSTEMI (non-ST elevated myocardial infarction)     Today is hospital day 1d. After cath, he denied CP, SOB, groin pain.   PAST MEDICAL & SURGICAL HISTORY  Rheumatic fever in pediatric patient  Afib  H/O right knee surgery  H/O left knee surgery  H/O prior ablation treatment  History of cardioversion    SOCIAL HISTORY:  Negative for smoking/alcohol/drug use.     ALLERGIES:  penicillins (Unknown)    MEDICATIONS:  STANDING MEDICATIONS  aspirin enteric coated 81 milliGRAM(s) Oral daily  atorvastatin 80 milliGRAM(s) Oral at bedtime  clopidogrel Tablet 75 milliGRAM(s) Oral daily  heparin  Infusion 1300 Unit(s)/Hr IV Continuous <Continuous>  metoprolol tartrate 12.5 milliGRAM(s) Oral every 12 hours    PRN MEDICATIONS    VITALS:   T(F): 96  HR: 72  BP: 112/78  RR: 21  SpO2: 97%    LABS:                        12.4   9.34  )-----------( 211      ( 02 Aug 2018 04:38 )             36.7     08-02    141  |  107  |  13  ----------------------------<  105<H>  4.1   |  20  |  0.8    Ca    8.8      02 Aug 2018 04:38  Mg     2.0     08-02    TPro  5.8<L>  /  Alb  3.8  /  TBili  1.5<H>  /  DBili  0.5<H>  /  AST  22  /  ALT  30  /  AlkPhos  62  08-02    PT/INR - ( 02 Aug 2018 04:38 )   PT: 15.80 sec;   INR: 1.47 ratio         PTT - ( 02 Aug 2018 00:13 )  PTT:35.6 sec      Creatine Kinase, Serum: 75 U/L (08-02-18 @ 04:38)  Troponin T, Serum: 0.10 ng/mL <HH> (08-02-18 @ 04:38)  Creatine Kinase, Serum: 62 U/L (08-01-18 @ 20:00)  Troponin T, Serum: 0.05 ng/mL <HH> (08-01-18 @ 20:00)  Creatine Kinase, Serum: 67 U/L (08-01-18 @ 14:14)  Troponin T, Serum: 0.05 ng/mL <HH> (08-01-18 @ 14:14)      CARDIAC MARKERS ( 02 Aug 2018 04:38 )  x     / 0.10 ng/mL / 75 U/L / x     / 7.4 ng/mL  CARDIAC MARKERS ( 01 Aug 2018 20:00 )  x     / 0.05 ng/mL / 62 U/L / x     / 3.8 ng/mL  CARDIAC MARKERS ( 01 Aug 2018 14:14 )  x     / 0.05 ng/mL / 67 U/L / x     / x          RADIOLOGY:  < from: Transthoracic Echocardiogram (08.02.18 @ 07:11) >   1. Severely decreased global left ventricular systolic function.   2. Left atrial enlargement.   3. LV Ejection Fraction by Herrera's Method with a biplane EF of 26 %.   4. Mild mitral valve regurgitation.   5. Thickening of the anterior and posterior mitral valve leaflets.   6. Moderate-severe tricuspid regurgitation.   7. Mild to moderate aortic regurgitation.   8. Pulmonic valve regurgitation.   9. Estimated pulmonary artery systolic pressure is 72.9 mmHg assuming a   right atrial pressure of 15 mmHg, which is consistent with severe   pulmonary hypertension.  10. Peak transaortic gradient is 99.4 mmHg, mean transaortic gradient   equals 60.1 mmHg, the calculated aortic valve area equals 0.70 cm² by the   continuity equation consistent with severe aortic stenosis.    < end of copied text >    PHYSICAL EXAM:  GEN: No acute distress  LUNGS: Clear to auscultation bilaterally   HEART: Regular  ABD: Soft, non-tender, non-distended. Groin dressing intact, not bleeding, no fluid  EXT: NC/NC/NE/2+PP/PRICE/Skin Intact.   NEURO: AAOX3    Intravenous access:   NG tube:   Juarez Catheter: SUBJECTIVE:    Patient is a 53y old Male who presents with a chief complaint of Shortness of breath with exertion (01 Aug 2018 20:16)    Currently admitted to medicine with the primary diagnosis of NSTEMI (non-ST elevated myocardial infarction)     Today is hospital day 1d. After cath, he denied CP, SOB, groin incision pain.   PAST MEDICAL & SURGICAL HISTORY  Rheumatic fever in pediatric patient  Afib  H/O right knee surgery  H/O left knee surgery  H/O prior ablation treatment  History of cardioversion    SOCIAL HISTORY:  Negative for smoking/alcohol/drug use.     ALLERGIES:  penicillins (Unknown)    MEDICATIONS:  STANDING MEDICATIONS  aspirin enteric coated 81 milliGRAM(s) Oral daily  atorvastatin 80 milliGRAM(s) Oral at bedtime  clopidogrel Tablet 75 milliGRAM(s) Oral daily  heparin  Infusion 1300 Unit(s)/Hr IV Continuous <Continuous>  metoprolol tartrate 12.5 milliGRAM(s) Oral every 12 hours    PRN MEDICATIONS    VITALS:   T(F): 96  HR: 72  BP: 112/78  RR: 21  SpO2: 97%    LABS:                        12.4   9.34  )-----------( 211      ( 02 Aug 2018 04:38 )             36.7     08-02    141  |  107  |  13  ----------------------------<  105<H>  4.1   |  20  |  0.8    Ca    8.8      02 Aug 2018 04:38  Mg     2.0     08-02    TPro  5.8<L>  /  Alb  3.8  /  TBili  1.5<H>  /  DBili  0.5<H>  /  AST  22  /  ALT  30  /  AlkPhos  62  08-02    PT/INR - ( 02 Aug 2018 04:38 )   PT: 15.80 sec;   INR: 1.47 ratio         PTT - ( 02 Aug 2018 00:13 )  PTT:35.6 sec      Creatine Kinase, Serum: 75 U/L (08-02-18 @ 04:38)  Troponin T, Serum: 0.10 ng/mL <HH> (08-02-18 @ 04:38)  Creatine Kinase, Serum: 62 U/L (08-01-18 @ 20:00)  Troponin T, Serum: 0.05 ng/mL <HH> (08-01-18 @ 20:00)  Creatine Kinase, Serum: 67 U/L (08-01-18 @ 14:14)  Troponin T, Serum: 0.05 ng/mL <HH> (08-01-18 @ 14:14)      CARDIAC MARKERS ( 02 Aug 2018 04:38 )  x     / 0.10 ng/mL / 75 U/L / x     / 7.4 ng/mL  CARDIAC MARKERS ( 01 Aug 2018 20:00 )  x     / 0.05 ng/mL / 62 U/L / x     / 3.8 ng/mL  CARDIAC MARKERS ( 01 Aug 2018 14:14 )  x     / 0.05 ng/mL / 67 U/L / x     / x          RADIOLOGY:  < from: Transthoracic Echocardiogram (08.02.18 @ 07:11) >   1. Severely decreased global left ventricular systolic function.   2. Left atrial enlargement.   3. LV Ejection Fraction by Herrera's Method with a biplane EF of 26 %.   4. Mild mitral valve regurgitation.   5. Thickening of the anterior and posterior mitral valve leaflets.   6. Moderate-severe tricuspid regurgitation.   7. Mild to moderate aortic regurgitation.   8. Pulmonic valve regurgitation.   9. Estimated pulmonary artery systolic pressure is 72.9 mmHg assuming a   right atrial pressure of 15 mmHg, which is consistent with severe   pulmonary hypertension.  10. Peak transaortic gradient is 99.4 mmHg, mean transaortic gradient   equals 60.1 mmHg, the calculated aortic valve area equals 0.70 cm² by the   continuity equation consistent with severe aortic stenosis.    < end of copied text >    PHYSICAL EXAM:  GEN: No acute distress  LUNGS: Clear to auscultation bilaterally   HEART: systolic murmur, rrr  ABD: Soft, non-tender, non-distended. Groin dressing intact, not bleeding, no fluid  EXT: NC/NC/NE/2+PP/PRICE/Skin Intact.   NEURO: AAOX3    Intravenous access:   NG tube:   Juarez Catheter:

## 2018-08-02 NOTE — PROGRESS NOTE ADULT - ASSESSMENT
54 YO M with Hx of AFib s/p cardioversion and ablation, rheumatic fever as a child, presents for OCONNOR and intermittent chest pain with exertion.       1) SOB 2/2 NSTEMI/Stable Angina vs worsening valvular disease 2/2 rheumatic fever/calcification  - Heparin drip, keep PTT 50-70  - c/w ASA, Plavix, BB, High intensity statin  - Cath 8/2  - TTE 8/2- EF 26%, L atrial enlargement, thicken anterior and posterior mitral valve leaflets, mod-severe tricuspid regurg, mild-mod aortic regurg, pulmonic valve regurg, severe pulm htn, severe aortic stenosis  -Mild troponin elevation 0.10 <-0.05    2) PPx/Diet/Dispo  - On heparin  - Heart healthy diet   - Full code, from home 52 YO M with Hx of AFib s/p cardioversion and ablation, rheumatic fever as a child, presents for OCONNOR and intermittent chest pain with exertion.       1) SOB 2/2 NSTEMI/Stable Angina vs worsening valvular disease 2/2 rheumatic fever/calcification  - Heparin drip, keep PTT 50-70  - c/w ASA, Plavix, BB, High intensity statin  - Cath 8/2- Critical AS/Severe CM/PHTN  -CT surgery consult  - TTE 8/2- EF 26%, L atrial enlargement, thicken anterior and posterior mitral valve leaflets, mod-severe tricuspid regurg, mild-mod aortic regurg, pulmonic valve regurg, severe pulm htn, severe aortic stenosis  -Mild troponin elevation 0.10 <-0.05    2) PPx/Diet/Dispo  - On heparin  - Heart healthy diet   - Full code, from home 52 YO M with Hx of AFib s/p cardioversion and ablation, rheumatic fever as a child, presents for OCONNOR and intermittent chest pain with exertion.       1) SOB 2/2 NSTEMI/Stable Angina vs worsening valvular disease 2/2 rheumatic fever/calcification  - c/w ASA, BB, High intensity statin  - Cath 8/2- Critical AS/Severe CM/PHTN  -CT surgery consult  - TTE 8/2- EF 26%, L atrial enlargement, thicken anterior and posterior mitral valve leaflets, mod-severe tricuspid regurg, mild-mod aortic regurg, pulmonic valve regurg, severe pulm htn, severe aortic stenosis  -Mild troponin elevation 0.10 <-0.05    2) PPx/Diet/Dispo  - lovenox 40 qd  - Heart healthy diet   - Full code, from home

## 2018-08-03 DIAGNOSIS — E87.70 FLUID OVERLOAD, UNSPECIFIED: ICD-10-CM

## 2018-08-03 DIAGNOSIS — I25.10 ATHEROSCLEROTIC HEART DISEASE OF NATIVE CORONARY ARTERY WITHOUT ANGINA PECTORIS: ICD-10-CM

## 2018-08-03 DIAGNOSIS — I35.0 NONRHEUMATIC AORTIC (VALVE) STENOSIS: ICD-10-CM

## 2018-08-03 LAB
ALBUMIN SERPL ELPH-MCNC: 4 G/DL — SIGNIFICANT CHANGE UP (ref 3.5–5.2)
ALP SERPL-CCNC: 72 U/L — SIGNIFICANT CHANGE UP (ref 30–115)
ALT FLD-CCNC: 28 U/L — SIGNIFICANT CHANGE UP (ref 0–41)
ANION GAP SERPL CALC-SCNC: 13 MMOL/L — SIGNIFICANT CHANGE UP (ref 7–14)
APPEARANCE UR: ABNORMAL
APTT BLD: 31.8 SEC — SIGNIFICANT CHANGE UP (ref 27–39.2)
AST SERPL-CCNC: 20 U/L — SIGNIFICANT CHANGE UP (ref 0–41)
BILIRUB SERPL-MCNC: 1.6 MG/DL — HIGH (ref 0.2–1.2)
BILIRUB UR-MCNC: NEGATIVE — SIGNIFICANT CHANGE UP
BUN SERPL-MCNC: 17 MG/DL — SIGNIFICANT CHANGE UP (ref 10–20)
CALCIUM SERPL-MCNC: 9 MG/DL — SIGNIFICANT CHANGE UP (ref 8.5–10.1)
CHLORIDE SERPL-SCNC: 101 MMOL/L — SIGNIFICANT CHANGE UP (ref 98–110)
CO2 SERPL-SCNC: 26 MMOL/L — SIGNIFICANT CHANGE UP (ref 17–32)
COLOR SPEC: YELLOW — SIGNIFICANT CHANGE UP
CREAT SERPL-MCNC: 1 MG/DL — SIGNIFICANT CHANGE UP (ref 0.7–1.5)
DIFF PNL FLD: ABNORMAL
ESTIMATED AVERAGE GLUCOSE: 103 MG/DL — SIGNIFICANT CHANGE UP (ref 68–114)
GLUCOSE SERPL-MCNC: 109 MG/DL — HIGH (ref 70–99)
GLUCOSE UR QL: NEGATIVE MG/DL — SIGNIFICANT CHANGE UP
HBA1C BLD-MCNC: 5.2 % — SIGNIFICANT CHANGE UP (ref 4–5.6)
HCT VFR BLD CALC: 38.1 % — LOW (ref 42–52)
HGB BLD-MCNC: 13.2 G/DL — LOW (ref 14–18)
INR BLD: 1.51 RATIO — HIGH (ref 0.65–1.3)
KETONES UR-MCNC: NEGATIVE — SIGNIFICANT CHANGE UP
LEUKOCYTE ESTERASE UR-ACNC: NEGATIVE — SIGNIFICANT CHANGE UP
MAGNESIUM SERPL-MCNC: 1.9 MG/DL — SIGNIFICANT CHANGE UP (ref 1.8–2.4)
MCHC RBC-ENTMCNC: 32 PG — HIGH (ref 27–31)
MCHC RBC-ENTMCNC: 34.6 G/DL — SIGNIFICANT CHANGE UP (ref 32–37)
MCV RBC AUTO: 92.3 FL — SIGNIFICANT CHANGE UP (ref 80–94)
NITRITE UR-MCNC: NEGATIVE — SIGNIFICANT CHANGE UP
NRBC # BLD: 0 /100 WBCS — SIGNIFICANT CHANGE UP (ref 0–0)
NT-PROBNP SERPL-SCNC: 8134 PG/ML — HIGH (ref 0–300)
PH UR: 6 — SIGNIFICANT CHANGE UP (ref 5–8)
PLATELET # BLD AUTO: 232 K/UL — SIGNIFICANT CHANGE UP (ref 130–400)
POTASSIUM SERPL-MCNC: 4.3 MMOL/L — SIGNIFICANT CHANGE UP (ref 3.5–5)
POTASSIUM SERPL-SCNC: 4.3 MMOL/L — SIGNIFICANT CHANGE UP (ref 3.5–5)
PROT SERPL-MCNC: 6 G/DL — SIGNIFICANT CHANGE UP (ref 6–8)
PROT UR-MCNC: 30 MG/DL
PROTHROM AB SERPL-ACNC: 16.2 SEC — HIGH (ref 9.95–12.87)
RBC # BLD: 4.13 M/UL — LOW (ref 4.7–6.1)
RBC # FLD: 13.7 % — SIGNIFICANT CHANGE UP (ref 11.5–14.5)
RBC CASTS # UR COMP ASSIST: ABNORMAL /HPF
SODIUM SERPL-SCNC: 140 MMOL/L — SIGNIFICANT CHANGE UP (ref 135–146)
SP GR SPEC: 1.02 — SIGNIFICANT CHANGE UP (ref 1.01–1.03)
UROBILINOGEN FLD QL: 1 MG/DL (ref 0.2–0.2)
WBC # BLD: 10.3 K/UL — SIGNIFICANT CHANGE UP (ref 4.8–10.8)
WBC # FLD AUTO: 10.3 K/UL — SIGNIFICANT CHANGE UP (ref 4.8–10.8)

## 2018-08-03 RX ORDER — FAMOTIDINE 10 MG/ML
20 INJECTION INTRAVENOUS
Qty: 0 | Refills: 0 | Status: DISCONTINUED | OUTPATIENT
Start: 2018-08-03 | End: 2018-08-08

## 2018-08-03 RX ADMIN — Medication 40 MILLIGRAM(S): at 05:44

## 2018-08-03 RX ADMIN — Medication 650 MILLIGRAM(S): at 16:25

## 2018-08-03 RX ADMIN — ENOXAPARIN SODIUM 40 MILLIGRAM(S): 100 INJECTION SUBCUTANEOUS at 18:05

## 2018-08-03 RX ADMIN — Medication 12.5 MILLIGRAM(S): at 05:44

## 2018-08-03 RX ADMIN — Medication 81 MILLIGRAM(S): at 16:25

## 2018-08-03 RX ADMIN — FAMOTIDINE 20 MILLIGRAM(S): 10 INJECTION INTRAVENOUS at 18:05

## 2018-08-03 RX ADMIN — Medication 650 MILLIGRAM(S): at 17:10

## 2018-08-03 RX ADMIN — ATORVASTATIN CALCIUM 80 MILLIGRAM(S): 80 TABLET, FILM COATED ORAL at 21:31

## 2018-08-03 NOTE — PROGRESS NOTE ADULT - ASSESSMENT
PLAN  Neuro: move all 4 extremities.  Pain management done.   acetaminophen   Tablet. 650 milliGRAM(s) Oral every 6 hours PRN    Pulm: Encourage coughing, deep breathing and use of incentive spirometry. Wean off supplemental oxygen as able. Daily CXR.     Cardio: Monitor telemetry/alarms. Continue supportive care   furosemide   Injectable 40 milliGRAM(s) IV Push daily  metoprolol tartrate 12.5 milliGRAM(s) Oral every 12 hours    GI: Tolerating diet. Continue stool softeners.      Nutrition: Continue cardiac, carb consistent diet as tolerated  Endocrine and glucose control:   atorvastatin 80 milliGRAM(s) Oral at bedtime    Renal: monitor urine output, supplement electrolytes as needed,     Vasc: Heparin SC and/or SCDs for DVT prophylaxis  Heme: 13.2 g/dL  12.4 g/dL  13.1 g/dL    aspirin enteric coated 81 milliGRAM(s) Oral daily  enoxaparin Injectable 40 milliGRAM(s) SubCutaneous every 24 hours    ID: Off antibiotics. Stable, no fever , no chills.     Therapy: OOB/ambulate  Disposition: start planing discharge home or placement    Pertinent clinical, laboratory, radiographic, hemodynamic, echocardiographic, respiratory data, microbiologic data and chart were reviewed and analyzed frequently throughout the course of the day and night. GI and DVT prophylaxis, glycemic control, head of bed elevation and skin care issues were addressed.  Patient seen, examined and plan discussed with CT Surgery / CTICU team during rounds.

## 2018-08-03 NOTE — PROGRESS NOTE ADULT - SUBJECTIVE AND OBJECTIVE BOX
OPERATIVE PROCEDURE(s):     Pre-op AVR/MAZE                    53yMale  SURGEON(s): KAN Boggs  SUBJECTIVE ASSESSMENT:  Patient has no complaints at this time.    Vital Signs Last 24 Hrs  T(F): 96.7 (03 Aug 2018 08:00), Max: 97.9 (03 Aug 2018 04:00)  HR: 74 (03 Aug 2018 12:00) (66 - 82)  BP: 93/64 (03 Aug 2018 12:00) (85/62 - 137/83)  BP(mean): 86 (03 Aug 2018 10:00) (70 - 118)  RR: 17 (03 Aug 2018 12:00) (11 - 29)  SpO2: 98% (03 Aug 2018 12:00) (95% - 100%)      I&O's Detail    02 Aug 2018 07:01  -  03 Aug 2018 07:00  --------------------------------------------------------  IN:    heparin Infusion: 13 mL    Oral Fluid: 340 mL  Total IN: 353 mL    OUT:    Voided: 5175 mL  Total OUT: 5175 mL        Net:   I&O's Detail    01 Aug 2018 07:01  -  02 Aug 2018 07:00  --------------------------------------------------------  Total NET: -672 mL      02 Aug 2018 07:01  -  03 Aug 2018 07:00  --------------------------------------------------------  Total NET: -4822 mL        Physical Exam:  General: NAD; A&Ox3  Cardiac: S1/S2, RRR, no murmur, no rubs  Lungs: unlabored respirations, CTA b/l, no wheeze, no rales, no crackles  Abdomen: Soft/NT/ND; positive bowel sounds x 4  Extremities: No edema b/l lower extremities; good capillary refill; no cyanosis; palpable 1+ pedal pulses b/l          LABS:                        13.2<L>  10.30 )-----------( 232      ( 03 Aug 2018 04:00 )             38.1<L>                        12.4<L>  9.34  )-----------( 211      ( 02 Aug 2018 04:38 )             36.7<L>    0803    140  |  101  |  17  ----------------------------<  109<H>  4.3   |  26  |  1.0  08    141  |  107  |  13  ----------------------------<  105<H>  4.1   |  20  |  0.8    Ca    9.0      03 Aug 2018 04:00  Mg     1.9     08-    TPro  6.0 [6.0 - 8.0]  /  Alb  4.0 [3.5 - 5.2]  /  TBili  1.6<H> [0.2 - 1.2]  /  DBili  x   /  AST  20 [0 - 41]  /  ALT  28 [0 - 41]  /  AlkPhos  72 [30 - 115]  0803    PT/INR - ( 03 Aug 2018 04:00 )   PT: ;   INR: 1.51 ratio         PT/INR - ( 02 Aug 2018 04:38 )   PT: ;   INR: 1.47 ratio         PTT - ( 03 Aug 2018 04:00 )  PTT:31.8 sec, PTT - ( 02 Aug 2018 00:13 )  PTT:35.6 sec  Urinalysis Basic - ( 03 Aug 2018 05:40 )    Color: Yellow / Appearance: Cloudy / S.020 / pH: x  Gluc: x / Ketone: Negative  / Bili: Negative / Urobili: 1.0 mg/dL   Blood: x / Protein: 30 mg/dL / Nitrite: Negative   Leuk Esterase: Negative / RBC: 2-5 /HPF / WBC x   Sq Epi: x / Non Sq Epi: x / Bacteria: x        MEDICATIONS  (STANDING):  aspirin enteric coated 81 milliGRAM(s) Oral daily  atorvastatin 80 milliGRAM(s) Oral at bedtime  enoxaparin Injectable 40 milliGRAM(s) SubCutaneous every 24 hours  furosemide   Injectable 40 milliGRAM(s) IV Push daily  metoprolol tartrate 12.5 milliGRAM(s) Oral every 12 hours    MEDICATIONS  (PRN):  acetaminophen   Tablet. 650 milliGRAM(s) Oral every 6 hours PRN Mild Pain (1 - 3)    LOVENOX:[x] YES [] NO  Dose: 40mg Q24H  SCD's: YES b/l  GI Prophylaxis: Protonix [], Pepcid [x], None [], (Contra-indication:.....)      Allergies:  penicillins (Unknown)      Ambulation/Activity Status: Ambulates several times daily without assistance.    Assessment/Plan:  53y Male status-post .....  - Case and plan discussed with CTU Intensivist and CT Surgeon - Dr. Boggs/Anita/Vesna   - Continue CTU supportive care    - Continue DVT/GI prophylaxis  - Incentive Spirometry 10 times an hour  - Continue to advance physical activity as tolerated and continue PT/OT as directed  1. Pre-op AVR/MAZE monday  2. Dental clearance today

## 2018-08-03 NOTE — PROGRESS NOTE ADULT - SUBJECTIVE AND OBJECTIVE BOX
CTU Attending Progress Daily Note     03 Aug 2018 11:00  Admited 18, Hospital Day 2d    HPI:  This is a 54 YO M with PMH AFib s/p cardioversion in the ED in May and ablation years ago, rheumatic fever as a child, and valve disease (patient unaware of the specifics, but has been told by a physician his valve was "calcified"), who presents with shortness of breath for the last few days to week with exertion, as well as intermittent mild chest discomfort also with exertion, which patient initially attributed to gas pain. He underwent bedside cardioversion in the ED May 11th and was DC'd home to follow up with his cardiologist, but was not given any medications since his CHADS-Vasc was 0. When he went to cardio office today he was sent to the ED due to his symptoms and ECG that showed V4-V6 TWI and elevation in AVR. In ED STEMI code was called but then cancelled when prior ECGs were seen to be similar and was determined to be LVH with S-T repolarization abnormalities. Of note, patient reports he has had a negative stress test in the past as well.   Currently denies any symptoms, resting comfortably in bed.    ICU Vital Signs Last 24 Hrs  T(C): 36.7 (01 Aug 2018 18:04), Max: 36.7 (01 Aug 2018 18:04)  T(F): 98 (01 Aug 2018 18:04), Max: 98 (01 Aug 2018 18:04)  HR: 85 (01 Aug 2018 18:04) (84 - 85)  BP: 113/73 (01 Aug 2018 18:04) (113/73 - 113/75)  BP(mean): --  ABP: --  ABP(mean): --  RR: 19 (01 Aug 2018 18:04) (18 - 19)  SpO2: 100% (01 Aug 2018 18:04) (99% - 100%) (01 Aug 2018 20:16)    Home Medications:    FAMILY HISTORY:  Family history of heart disease (Father, Grandparent): Father and grandfather  50s-60s due to CAD    PAST MEDICAL & SURGICAL HISTORY:  Rheumatic fever in pediatric patient  Afib  H/O right knee surgery  H/O left knee surgery  H/O prior ablation treatment  History of cardioversion    Interval event for past 24 hr:  JOCELYN SUNG  53y had no event.   Current Complains:  JOCELYN SUNG has no new complains  Allergies    penicillins (Unknown)    Intolerances      OBJECTIVE:  Vitals last 24 hrs  T(C): 35.9 (18 @ 08:00), Max: 36.6 (18 @ 04:00)  T(F): 96.7 (18 @ 08:00), Max: 97.9 (18 @ 04:00)  HR: 66 (18 @ 10:00) (66 - 82)  BP: 94/646 (18 @ 10:00) (85/62 - 137/83)  ABP: --  ABP(mean): --  RR: 15 (18 @ 10:00) (11 - 29)  SpO2: 99% (18 @ 10:00) (95% - 100%)  CVP(mm Hg): --      18 @ 07:01  -  18 @ 07:00  --------------------------------------------------------  IN:    heparin Infusion: 13 mL    Oral Fluid: 340 mL  Total IN: 353 mL    OUT:    Voided: 5175 mL  Total OUT: 5175 mL    Total NET: -4822 mL          CAPILLARY BLOOD GLUCOSE        LABS:                          13.2   10.30 )-----------( 232      ( 03 Aug 2018 04:00 )             38.1     Hemoglobin: 13.2 g/dL ( @ 04:00)  Hemoglobin: 12.4 g/dL ( @ 04:38)  Hemoglobin: 13.1 g/dL ( @ 14:14)        140  |  101  |  17  ----------------------------<  109<H>  4.3   |  26  |  1.0    Ca    9.0      03 Aug 2018 04:00  Mg     1.9         TPro  6.0  /  Alb  4.0  /  TBili  1.6<H>  /  DBili  x   /  AST  20  /  ALT  28  /  AlkPhos  72      Creatinine, Serum: 1.0 mg/dL ( @ 04:00)  Creatinine, Serum: 0.8 mg/dL ( @ 04:38)  Creatinine, Serum: 0.9 mg/dL ( @ 14:14)    PT/INR - ( 03 Aug 2018 04:00 )   PT: 16.20 sec;   INR: 1.51 ratio     PTT - ( 03 Aug 2018 04:00 )  PTT:31.8 sec  Urinalysis Basic - ( 03 Aug 2018 05:40 )    Color: Yellow / Appearance: Cloudy / S.020 / pH: x  Gluc: x / Ketone: Negative  / Bili: Negative / Urobili: 1.0 mg/dL   Blood: x / Protein: 30 mg/dL / Nitrite: Negative   Leuk Esterase: Negative / RBC: 2-5 /HPF / WBC x   Sq Epi: x / Non Sq Epi: x / Bacteria: x        HOSPITAL MEDICATIONS:  MEDICATIONS  (STANDING):  aspirin enteric coated 81 milliGRAM(s) Oral daily  atorvastatin 80 milliGRAM(s) Oral at bedtime  enoxaparin Injectable 40 milliGRAM(s) SubCutaneous every 24 hours  furosemide   Injectable 40 milliGRAM(s) IV Push daily  metoprolol tartrate 12.5 milliGRAM(s) Oral every 12 hours    MEDICATIONS  (PRN):  acetaminophen   Tablet. 650 milliGRAM(s) Oral every 6 hours PRN Mild Pain (1 - 3)      REVIEW OF SYSTEMS:  CONSTITUTIONAL: [X] all negative; [ ] weakness, [ ] fevers, [ ] chills  EYES/ENT: [X] all negative; [ ] visual changes, [ ] vertigo, [ ] throat pain   NECK: [X] all negative; [ ] pain, [ ] stiffness  RESPIRATORY: [ ] all negative, [ ] cough, [ ] wheezing, [ ] hemoptysis, [ ] shortness of breath  CARDIOVASCULAR: [ ] all negative; [ ] chest pain, [ ] palpitations, [ ] orthopnea  GASTROINTESTINAL: [X] all negative; [ ]abdominal pain, [ ] nausea, [ ] vomiting, [ ] hematemesis, [ ] diarrhea, [ ] constipation, [ ] melena, [ ] hematochezia.  GENITOURINARY: [X] all negative; [ ] dysuria, [ ] frequency, [ ] hematuria  NEUROLOGICAL: [X] all negative; [ ] numbness, [ ] weakness  SKIN: [X] all negative; [ ] itching, [ ] burning, [ ] rashes, [ ] lesions   All other review of systems is negative unless indicated above.    [  ] Unable to assess ROS because     PHYSICAL EXAM:          CONSTITUTIONAL: Well-developed; well-nourished; in no acute distress.   	SKIN: warm, dry, no rashes or lesions  	HENT: Atrumatic. Normocephalic. PERRL. Moist membranes, no conj injection, sclera clear  	NECK: Supple; non tender.  No JVD. No lymphadenopathy.  	CARD: Normal S1, S2. Rate and Rythm are regular. 2+murmur  	RESP: CTA B/L. no wheezes, no rales no rhonchi.  	ABD: Soft, not tender, not distended, no CVA ttp no rebound no guarding, bowel sounds present  	EXT: Normal ROM.  No clubbing, no cyanosis, no pedal edema, no calf pain b/l, Peripheral pulses intact.  	LYMPH: No acute cervical adenopathy.  	NEURO: Alert, awake, motor 5/5 R, 5/5 L, sensation intact bilat, CN 2-12 intact,          PSYCH: Cooperative, appropriate. Alert & oriented x 3    RADIOLOGY:    ECG:  X Reviewed and interpreted by me - NSR

## 2018-08-04 LAB
HCT VFR BLD CALC: 39.9 % — LOW (ref 42–52)
HGB BLD-MCNC: 13.6 G/DL — LOW (ref 14–18)
MCHC RBC-ENTMCNC: 31.1 PG — HIGH (ref 27–31)
MCHC RBC-ENTMCNC: 34.1 G/DL — SIGNIFICANT CHANGE UP (ref 32–37)
MCV RBC AUTO: 91.1 FL — SIGNIFICANT CHANGE UP (ref 80–94)
NRBC # BLD: 0 /100 WBCS — SIGNIFICANT CHANGE UP (ref 0–0)
PLATELET # BLD AUTO: 225 K/UL — SIGNIFICANT CHANGE UP (ref 130–400)
RBC # BLD: 4.38 M/UL — LOW (ref 4.7–6.1)
RBC # FLD: 13.5 % — SIGNIFICANT CHANGE UP (ref 11.5–14.5)
WBC # BLD: 9.44 K/UL — SIGNIFICANT CHANGE UP (ref 4.8–10.8)
WBC # FLD AUTO: 9.44 K/UL — SIGNIFICANT CHANGE UP (ref 4.8–10.8)

## 2018-08-04 RX ADMIN — FAMOTIDINE 20 MILLIGRAM(S): 10 INJECTION INTRAVENOUS at 06:14

## 2018-08-04 RX ADMIN — Medication 81 MILLIGRAM(S): at 11:43

## 2018-08-04 RX ADMIN — FAMOTIDINE 20 MILLIGRAM(S): 10 INJECTION INTRAVENOUS at 17:47

## 2018-08-04 RX ADMIN — ATORVASTATIN CALCIUM 80 MILLIGRAM(S): 80 TABLET, FILM COATED ORAL at 22:03

## 2018-08-04 RX ADMIN — Medication 40 MILLIGRAM(S): at 06:14

## 2018-08-04 NOTE — PROGRESS NOTE ADULT - SUBJECTIVE AND OBJECTIVE BOX
CTU Attending Progress Daily Note     04 Aug 2018 12:39  Admited 18, Hospital Day 3d      HPI:  This is a 54 YO M with PMH AFib s/p cardioversion in the ED in May and ablation years ago, rheumatic fever as a child, and valve disease (patient unaware of the specifics, but has been told by a physician his valve was "calcified"), who presents with shortness of breath for the last few days to week with exertion, as well as intermittent mild chest discomfort also with exertion, which patient initially attributed to gas pain. He underwent bedside cardioversion in the ED May 11th and was DC'd home to follow up with his cardiologist, but was not given any medications since his CHADS-Vasc was 0. When he went to cardio office today he was sent to the ED due to his symptoms and ECG that showed V4-V6 TWI and elevation in AVR. In ED STEMI code was called but then cancelled when prior ECGs were seen to be similar and was determined to be LVH with S-T repolarization abnormalities. Of note, patient reports he has had a negative stress test in the past as well.   Currently denies any symptoms, resting comfortably in bed.    ICU Vital Signs Last 24 Hrs  T(C): 36.7 (01 Aug 2018 18:04), Max: 36.7 (01 Aug 2018 18:04)  T(F): 98 (01 Aug 2018 18:04), Max: 98 (01 Aug 2018 18:04)  HR: 85 (01 Aug 2018 18:04) (84 - 85)  BP: 113/73 (01 Aug 2018 18:04) (113/73 - 113/75)  BP(mean): --  ABP: --  ABP(mean): --  RR: 19 (01 Aug 2018 18:04) (18 - 19)  SpO2: 100% (01 Aug 2018 18:04) (99% - 100%) (01 Aug 2018 20:16)    Home Medications:    FAMILY HISTORY:  Family history of heart disease (Father, Grandparent): Father and grandfather  50s-60s due to CAD    PAST MEDICAL & SURGICAL HISTORY:  Rheumatic fever in pediatric patient  Afib  H/O right knee surgery  H/O left knee surgery  H/O prior ablation treatment  History of cardioversion    Interval event for past 24 hr:  JOCELYN SUNG  53y had no event.   Current Complains:  JOCELYN SUNG has no new complains  Allergies    penicillins (Unknown)    Intolerances      OBJECTIVE:  Vitals last 24 hrs  T(C): 36.6 (18 @ 12:00), Max: 36.6 (18 @ 12:00)  T(F): 97.9 (18 @ 12:00), Max: 97.9 (18 @ 12:00)  HR: 71 (18 @ 12:00) (66 - 76)  BP: 96/59 (18 @ 12:00) (84/57 - 115/70)  ABP: --  ABP(mean): --  RR: 13 (18 @ 12:00) (13 - 28)  SpO2: 97% (18 @ 12:00) (97% - 100%)  CVP(mm Hg): --      18 @ 07:01  -  18 @ 07:00  --------------------------------------------------------  IN:    Oral Fluid: 240 mL  Total IN: 240 mL    OUT:    Voided: 1200 mL  Total OUT: 1200 mL    Total NET: -960 mL          CAPILLARY BLOOD GLUCOSE        LABS:                          13.6   9.44  )-----------( 225      ( 04 Aug 2018 04:30 )             39.9     Hemoglobin: 13.6 g/dL ( @ 04:30)  Hemoglobin: 13.2 g/dL ( @ 04:00)  Hemoglobin: 12.4 g/dL ( @ 04:38)  Hemoglobin: 13.1 g/dL ( @ 14:14)        140  |  101  |  17  ----------------------------<  109<H>  4.3   |  26  |  1.0    Ca    9.0      03 Aug 2018 04:00  Mg     1.9         TPro  6.0  /  Alb  4.0  /  TBili  1.6<H>  /  DBili  x   /  AST  20  /  ALT  28  /  AlkPhos  72      Creatinine, Serum: 1.0 mg/dL ( @ 04:00)  Creatinine, Serum: 0.8 mg/dL ( @ 04:38)  Creatinine, Serum: 0.9 mg/dL ( @ 14:14)    PT/INR - ( 03 Aug 2018 04:00 )   PT: 16.20 sec;   INR: 1.51 ratio         PTT - ( 03 Aug 2018 04:00 )  PTT:31.8 sec  Urinalysis Basic - ( 03 Aug 2018 05:40 )    Color: Yellow / Appearance: Cloudy / S.020 / pH: x  Gluc: x / Ketone: Negative  / Bili: Negative / Urobili: 1.0 mg/dL   Blood: x / Protein: 30 mg/dL / Nitrite: Negative   Leuk Esterase: Negative / RBC: 2-5 /HPF / WBC x   Sq Epi: x / Non Sq Epi: x / Bacteria: x        HOSPITAL MEDICATIONS:  MEDICATIONS  (STANDING):  aspirin enteric coated 81 milliGRAM(s) Oral daily  atorvastatin 80 milliGRAM(s) Oral at bedtime  enoxaparin Injectable 40 milliGRAM(s) SubCutaneous every 24 hours  famotidine    Tablet 20 milliGRAM(s) Oral two times a day  furosemide   Injectable 40 milliGRAM(s) IV Push daily  metoprolol tartrate 12.5 milliGRAM(s) Oral every 12 hours    MEDICATIONS  (PRN):  acetaminophen   Tablet. 650 milliGRAM(s) Oral every 6 hours PRN Mild Pain (1 - 3)      REVIEW OF SYSTEMS:  CONSTITUTIONAL: [X] all negative; [ ] weakness, [ ] fevers, [ ] chills  EYES/ENT: [X] all negative; [ ] visual changes, [ ] vertigo, [ ] throat pain   NECK: [X] all negative; [ ] pain, [ ] stiffness  RESPIRATORY: [x ] all negative, [ ] cough, [ ] wheezing, [ ] hemoptysis, [ ] shortness of breath  CARDIOVASCULAR: [x ] all negative; [ ] chest pain, [ ] palpitations, [ ] orthopnea  GASTROINTESTINAL: [X] all negative; [ ]abdominal pain, [ ] nausea, [ ] vomiting, [ ] hematemesis, [ ] diarrhea, [ ] constipation, [ ] melena, [ ] hematochezia.  GENITOURINARY: [X] all negative; [ ] dysuria, [ ] frequency, [ ] hematuria  NEUROLOGICAL: [X] all negative; [ ] numbness, [ ] weakness  SKIN: [X] all negative; [ ] itching, [ ] burning, [ ] rashes, [ ] lesions   All other review of systems is negative unless indicated above.    [  ] Unable to assess ROS because     PHYSICAL EXAM:          CONSTITUTIONAL: Well-developed; well-nourished; in no acute distress.   	SKIN: warm, dry, no rashes or lesions  	HENT: Atrumatic. Normocephalic. PERRL. Moist membranes, no conj injection, sclera clear  	NECK: Supple; non tender.  No JVD. No lymphadenopathy.  	CARD: Normal S1, S2. Rate and Rythm are regular. pansyst 3/6 murmurs.  	RESP: CTA B/L. no wheezes, no rales no rhonchi.  	ABD: Soft, not tender, not distended, no CVA ttp no rebound no guarding, bowel sounds present  	EXT: Normal ROM.  No clubbing, no cyanosis, no pedal edema, no calf pain b/l, Peripheral pulses intact.  	LYMPH: No acute cervical adenopathy.  	NEURO: Alert, awake, motor 5/5 R, 5/5 L, sensation intact bilat, CN 2-12 intact,          PSYCH: Cooperative, appropriate. Alert & oriented x 3    RADIOLOGY:    ECG:  X Reviewed and interpreted by me, NSR

## 2018-08-04 NOTE — PROGRESS NOTE ADULT - ASSESSMENT
PLAN  Neuro: move all 4 extremities.  Pain management done.   acetaminophen   Tablet. 650 milliGRAM(s) Oral every 6 hours PRN    Pulm: Encourage coughing, deep breathing and use of incentive spirometry. Wean off supplemental oxygen as able. Daily CXR.     Cardio: Monitor telemetry/alarms. Continue supportive care   furosemide   Injectable 40 milliGRAM(s) IV Push daily  metoprolol tartrate 12.5 milliGRAM(s) Oral every 12 hours    GI: Tolerating diet. Continue stool softeners.    famotidine    Tablet 20 milliGRAM(s) Oral two times a day    Nutrition: Continue cardiac, carb consistent diet as tolerated  Endocrine and glucose control:   atorvastatin 80 milliGRAM(s) Oral at bedtime    Renal: monitor urine output, supplement electrolytes as needed,     Vasc: Heparin SC and/or SCDs for DVT prophylaxis  Heme: 13.6 g/dL  13.2 g/dL    aspirin enteric coated 81 milliGRAM(s) Oral daily  enoxaparin Injectable 40 milliGRAM(s) SubCutaneous every 24 hours    ID: Off antibiotics. Stable, no fever , no chills.       Therapy: OOB/ambulate  Disposition: start planing discharge home or placement    Pertinent clinical, laboratory, radiographic, hemodynamic, echocardiographic, respiratory data, microbiologic data and chart were reviewed and analyzed frequently throughout the course of the day and night. GI and DVT prophylaxis, glycemic control, head of bed elevation and skin care issues were addressed.  Patient seen, examined and plan discussed with CT Surgery / CTICU team during rounds.

## 2018-08-04 NOTE — PROGRESS NOTE ADULT - PROBLEM SELECTOR PROBLEM 1
Aortic valve stenosis, etiology of cardiac valve disease unspecified
Aortic valve stenosis, etiology of cardiac valve disease unspecified

## 2018-08-04 NOTE — PROGRESS NOTE ADULT - SUBJECTIVE AND OBJECTIVE BOX
OPERATIVE PROCEDURE(s):  pre-op for avr               SURGEON(s):       SUBJECTIVE ASSESSMENT: pt is without complaints.  pt was cleared by dental yesterday    Vital Signs Last 24 Hrs  T(C): 36.7 (04 Aug 2018 16:00), Max: 36.7 (04 Aug 2018 16:00)  T(F): 98 (04 Aug 2018 16:00), Max: 98 (04 Aug 2018 16:00)  HR: 70 (04 Aug 2018 16:00) (66 - 76)  BP: 96/66 (04 Aug 2018 16:00) (84/57 - 115/70)  BP(mean): 73 (04 Aug 2018 06:15) (65 - 86)  RR: 18 (04 Aug 2018 16:00) (13 - 28)  SpO2: 98% (04 Aug 2018 16:00) (97% - 100%)  18 @ 07:01  -  18 @ 07:00  --------------------------------------------------------  IN: 240 mL / OUT: 1200 mL / NET: -960 mL    18 @ 07:01  -  18 @ 16:58  --------------------------------------------------------  IN: 0 mL / OUT: 1300 mL / NET: -1300 mL        Physical Exam  General: alert and oriented x 3  Chest: lungs cta bl  CVS: s1s2 pos murmur  Abd: pos bs soft nt  GI/ :  Ext: trace edema    Central Venous Catheter: Yes[ ]  No[x ] , If Yes indication:           Day #    Juarez Catheter: Yes  [ ] , No [ x] : If yes indication:                         Day #    NGT: Yes [ ] No [x  ]     If Yes Placement:                                          Day #    LABS:                        13.6   9.44  )-----------( 225      ( 04 Aug 2018 04:30 )             39.9     COUMADIN:   [ ] YES [x ] NO    PT/INR - ( 03 Aug 2018 04:00 )   PT: 16.20 sec;   INR: 1.51 ratio         PTT - ( 03 Aug 2018 04:00 )  PTT:31.8 sec      140  |  101  |  17  ----------------------------<  109<H>  4.3   |  26  |  1.0    Ca    9.0      03 Aug 2018 04:00  Mg     1.9         TPro  6.0  /  Alb  4.0  /  TBili  1.6<H>  /  DBili  x   /  AST  20  /  ALT  28  /  AlkPhos  72  08-    Urinalysis Basic - ( 03 Aug 2018 05:40 )    Color: Yellow / Appearance: Cloudy / S.020 / pH: x  Gluc: x / Ketone: Negative  / Bili: Negative / Urobili: 1.0 mg/dL   Blood: x / Protein: 30 mg/dL / Nitrite: Negative   Leuk Esterase: Negative / RBC: 2-5 /HPF / WBC x   Sq Epi: x / Non Sq Epi: x / Bacteria: x        MEDICATIONS  (STANDING):  aspirin enteric coated 81 milliGRAM(s) Oral daily  atorvastatin 80 milliGRAM(s) Oral at bedtime  enoxaparin Injectable 40 milliGRAM(s) SubCutaneous every 24 hours  famotidine    Tablet 20 milliGRAM(s) Oral two times a day  furosemide   Injectable 40 milliGRAM(s) IV Push daily  metoprolol tartrate 12.5 milliGRAM(s) Oral every 12 hours    MEDICATIONS  (PRN):  acetaminophen   Tablet. 650 milliGRAM(s) Oral every 6 hours PRN Mild Pain (1 - 3)      Allergies    penicillins (Unknown)    Intolerances        Ambulation/Activity Status:  ad luke      RADIOLOGY & ADDITIONAL TESTS:         Assessment/Plan:  Patient is a 52 yo male pre-op for avr poss tvr   cont present tx as per ct surgeon  surgery to be scheduled early next week-- pt will be scheduled for asaf     Social Service Disposition:

## 2018-08-05 RX ADMIN — FAMOTIDINE 20 MILLIGRAM(S): 10 INJECTION INTRAVENOUS at 17:12

## 2018-08-05 RX ADMIN — ATORVASTATIN CALCIUM 80 MILLIGRAM(S): 80 TABLET, FILM COATED ORAL at 22:06

## 2018-08-05 RX ADMIN — FAMOTIDINE 20 MILLIGRAM(S): 10 INJECTION INTRAVENOUS at 05:46

## 2018-08-05 RX ADMIN — ENOXAPARIN SODIUM 40 MILLIGRAM(S): 100 INJECTION SUBCUTANEOUS at 17:14

## 2018-08-05 RX ADMIN — Medication 81 MILLIGRAM(S): at 11:12

## 2018-08-05 RX ADMIN — Medication 40 MILLIGRAM(S): at 06:28

## 2018-08-05 NOTE — PROGRESS NOTE ADULT - SUBJECTIVE AND OBJECTIVE BOX
OPERATIVE PROCEDURE(s):  pre-op for avr              SURGEON(s):       SUBJECTIVE ASSESSMENT: pt is without complaints    Vital Signs Last 24 Hrs  T(C): 36.1 (05 Aug 2018 12:00), Max: 36.7 (04 Aug 2018 16:00)  T(F): 97 (05 Aug 2018 12:00), Max: 98 (04 Aug 2018 16:00)  HR: 78 (05 Aug 2018 14:00) (70 - 82)  BP: 94/68 (05 Aug 2018 14:00) (81/54 - 111/75)  BP(mean): 77 (05 Aug 2018 14:00) (65 - 87)  RR: 14 (05 Aug 2018 14:00) (13 - 26)  SpO2: 100% (05 Aug 2018 14:00) (98% - 100%)  08-04-18 @ 07:01  -  08-05-18 @ 07:00  --------------------------------------------------------  IN: 200 mL / OUT: 2100 mL / NET: -1900 mL    08-05-18 @ 07:01  -  08-05-18 @ 15:27  --------------------------------------------------------  IN: 300 mL / OUT: 950 mL / NET: -650 mL        Physical Exam  General: alert and oriented x 3  Chest: cta bl  CVS: heart s1s2 po murmur  Abd: pos bs, soft  GI/ :  Ext: no edema    Central Venous Catheter: Yes[ ]  No[x ] , If Yes indication:           Day #    Juarez Catheter: Yes  [ ] , No [x ] : If yes indication:                         Day #    NGT: Yes [ ] No [ x ]     If Yes Placement:                                          Day #    LABS:                        13.6   9.44  )-----------( 225      ( 04 Aug 2018 04:30 )             39.9     COUMADIN:   [ ] YES [ x] NO      MEDICATIONS  (STANDING):  aspirin enteric coated 81 milliGRAM(s) Oral daily  atorvastatin 80 milliGRAM(s) Oral at bedtime  enoxaparin Injectable 40 milliGRAM(s) SubCutaneous every 24 hours  famotidine    Tablet 20 milliGRAM(s) Oral two times a day    MEDICATIONS  (PRN):  acetaminophen   Tablet. 650 milliGRAM(s) Oral every 6 hours PRN Mild Pain (1 - 3)      Allergies    penicillins (Unknown)    Intolerances        Ambulation/Activity Status:    ad luke    RADIOLOGY & ADDITIONAL TESTS:  Diet, NPO after Midnight:      NPO Start Date: 05-Aug-2018,   NPO Start Time: 23:59  Except Medications (08-05-18 @ 10:42)        Assessment/Plan:  Patient is a 54 yo male with severe aortic stenosis, pre-op for avr  cont present tx as per ct surgeon  ryan lasix to avoid over diuresis and hypotension  dc lopressor as per dr agustin to avoid hypotension  npo after midnight for asaf in am    Social Service Disposition:

## 2018-08-06 LAB
ANION GAP SERPL CALC-SCNC: 11 MMOL/L — SIGNIFICANT CHANGE UP (ref 7–14)
APTT BLD: 31.8 SEC — SIGNIFICANT CHANGE UP (ref 27–39.2)
BUN SERPL-MCNC: 19 MG/DL — SIGNIFICANT CHANGE UP (ref 10–20)
CALCIUM SERPL-MCNC: 9.3 MG/DL — SIGNIFICANT CHANGE UP (ref 8.5–10.1)
CHLORIDE SERPL-SCNC: 101 MMOL/L — SIGNIFICANT CHANGE UP (ref 98–110)
CO2 SERPL-SCNC: 28 MMOL/L — SIGNIFICANT CHANGE UP (ref 17–32)
CREAT SERPL-MCNC: 0.9 MG/DL — SIGNIFICANT CHANGE UP (ref 0.7–1.5)
GLUCOSE SERPL-MCNC: 100 MG/DL — HIGH (ref 70–99)
HCT VFR BLD CALC: 40.8 % — LOW (ref 42–52)
HGB BLD-MCNC: 13.7 G/DL — LOW (ref 14–18)
INR BLD: 1.16 RATIO — SIGNIFICANT CHANGE UP (ref 0.65–1.3)
MCHC RBC-ENTMCNC: 31.3 PG — HIGH (ref 27–31)
MCHC RBC-ENTMCNC: 33.6 G/DL — SIGNIFICANT CHANGE UP (ref 32–37)
MCV RBC AUTO: 93.2 FL — SIGNIFICANT CHANGE UP (ref 80–94)
MRSA PCR RESULT.: NEGATIVE — SIGNIFICANT CHANGE UP
NRBC # BLD: 0 /100 WBCS — SIGNIFICANT CHANGE UP (ref 0–0)
PLATELET # BLD AUTO: 250 K/UL — SIGNIFICANT CHANGE UP (ref 130–400)
POTASSIUM SERPL-MCNC: 4.3 MMOL/L — SIGNIFICANT CHANGE UP (ref 3.5–5)
POTASSIUM SERPL-SCNC: 4.3 MMOL/L — SIGNIFICANT CHANGE UP (ref 3.5–5)
PROTHROM AB SERPL-ACNC: 12.5 SEC — SIGNIFICANT CHANGE UP (ref 9.95–12.87)
RBC # BLD: 4.38 M/UL — LOW (ref 4.7–6.1)
RBC # FLD: 13.5 % — SIGNIFICANT CHANGE UP (ref 11.5–14.5)
SODIUM SERPL-SCNC: 140 MMOL/L — SIGNIFICANT CHANGE UP (ref 135–146)
WBC # BLD: 9.19 K/UL — SIGNIFICANT CHANGE UP (ref 4.8–10.8)
WBC # FLD AUTO: 9.19 K/UL — SIGNIFICANT CHANGE UP (ref 4.8–10.8)

## 2018-08-06 RX ORDER — ENOXAPARIN SODIUM 100 MG/ML
40 INJECTION SUBCUTANEOUS DAILY
Qty: 0 | Refills: 0 | Status: COMPLETED | OUTPATIENT
Start: 2018-08-07 | End: 2018-08-07

## 2018-08-06 RX ORDER — AMIODARONE HYDROCHLORIDE 400 MG/1
200 TABLET ORAL EVERY 8 HOURS
Qty: 0 | Refills: 0 | Status: DISCONTINUED | OUTPATIENT
Start: 2018-08-06 | End: 2018-08-08

## 2018-08-06 RX ADMIN — Medication 81 MILLIGRAM(S): at 12:33

## 2018-08-06 RX ADMIN — AMIODARONE HYDROCHLORIDE 200 MILLIGRAM(S): 400 TABLET ORAL at 21:58

## 2018-08-06 RX ADMIN — FAMOTIDINE 20 MILLIGRAM(S): 10 INJECTION INTRAVENOUS at 19:23

## 2018-08-06 RX ADMIN — ATORVASTATIN CALCIUM 80 MILLIGRAM(S): 80 TABLET, FILM COATED ORAL at 21:58

## 2018-08-06 RX ADMIN — FAMOTIDINE 20 MILLIGRAM(S): 10 INJECTION INTRAVENOUS at 06:09

## 2018-08-06 NOTE — PROGRESS NOTE ADULT - SUBJECTIVE AND OBJECTIVE BOX
Cardiac Surgery Pre-op Note:    Consult requesting by: Dr. Kemp  Cardiologist: Dr. Simmons  PMD: Dr. Erwin    cc- I have shortness of breath      Allergies    penicillins (Unknown)    Intolerances      HPI:  This is a 52 YO M with PMH AFib s/p cardioversion in the ED in May and ablation years ago, rheumatic fever as a child, and valve disease (patient unaware of the specifics, but has been told by a physician his valve was "calcified"), who presents with shortness of breath for the last few days to week with exertion, as well as intermittent mild chest discomfort also with exertion, which patient initially attributed to gas pain. He underwent bedside cardioversion in the ED May 11th and was DC'd home to follow up with his cardiologist, but was not given any medications since his CHADS-Vasc was 0. When he went to cardio office today he was sent to the ED due to his symptoms and ECG that showed V4-V6 TWI and elevation in AVR. In ED STEMI code was called but then cancelled when prior ECGs were seen to be similar and was determined to be LVH with S-T repolarization abnormalities. Of note, patient reports he has had a negative stress test in the past as well.   Currently denies any symptoms, resting comfortably in bed.    ICU Vital Signs Last 24 Hrs  T(C): 36.7 (01 Aug 2018 18:04), Max: 36.7 (01 Aug 2018 18:04)  T(F): 98 (01 Aug 2018 18:04), Max: 98 (01 Aug 2018 18:04)  HR: 85 (01 Aug 2018 18:04) (84 - 85)  BP: 113/73 (01 Aug 2018 18:04) (113/73 - 113/75)  BP(mean): --  ABP: --  ABP(mean): --  RR: 19 (01 Aug 2018 18:04) (18 - 19)  SpO2: 100% (01 Aug 2018 18:04) (99% - 100%) (01 Aug 2018 20:16)      PAST MEDICAL & SURGICAL HISTORY:  Rheumatic fever in pediatric patient  Afib  H/O right knee surgery  H/O left knee surgery  H/O prior ablation treatment  History of cardioversion      MEDICATIONS  (STANDING):  aspirin enteric coated 81 milliGRAM(s) Oral daily  atorvastatin 80 milliGRAM(s) Oral at bedtime  enoxaparin Injectable 40 milliGRAM(s) SubCutaneous every 24 hours  famotidine    Tablet 20 milliGRAM(s) Oral two times a day    MEDICATIONS  (PRN):  acetaminophen   Tablet. 650 milliGRAM(s) Oral every 6 hours PRN Mild Pain (1 - 3)      Labs:                        13.7   9.19  )-----------( 250      ( 06 Aug 2018 02:15 )             40.8     08-06    140  |  101  |  19  ----------------------------<  100<H>  4.3   |  28  |  0.9    Ca    9.3      06 Aug 2018 02:15      PT/INR - ( 06 Aug 2018 02:15 )   PT: 12.50 sec;   INR: 1.16 ratio         PTT - ( 06 Aug 2018 02:15 )  PTT:31.8 sec    Blood Type:   HGB A1C: Hemoglobin A1C, Whole Blood: 5.2 % (08-03 @ 04:00)    Prealbumin:   Pro-BNP: Serum Pro-Brain Natriuretic Peptide: 8134 pg/mL (08-03 @ 04:00)    Thyroid Panel:   MRSA:  / MSSA: pending    CXR: EXAM:  XR CHEST PORTABLE IMMED 1V            PROCEDURE DATE:  08/01/2018      INTERPRETATION:  Clinical History / Reason for exam: Chest pain.    Comparison : Chest radiograph dated May 11, 2018.    Technique/Positioning: Satisfactory.    Findings:    Support devices: None.    Cardiac/mediastinum/hilum: Unremarkable.    Lung parenchyma/Pleura: Within normal limits.    Skeleton/soft tissues: No acute osseous abnormality.    Impression:      No radiographic evidence of acute cardiopulmonarydisease.      EXAM:  CT CHEST            PROCEDURE DATE:  08/02/2018      INTERPRETATION:  Reason for Exam:  Aortic stenosis. Preoperative for   aortic valve replacement.    CT of the chest was performed from the thoracic inlet to the level of the   adrenal glands without contrast injection. Coronal and sagittal images   have been submitted.    Comparison: Chest x-ray 8/1/2018.    Findings:     Tubes/Lines: None.    Lungs, Pleura, and Airways:No focal consolidation, mass, pleural effusion   or pneumothorax. Major airways are patent.    Incompletely formed left major fissure is noted.    Punctate calcified granuloma, left upper lobe (series 5/172) No   suspicious pulmonary nodules.     Mediastinum/Lymph Nodes: Multistation enlarged mediastinal lymphnodes   measuring up to 1.4 cm short axis diameter (right paratracheal   compartment series 5/99).    Heart/Great Vessels:     No concentric pericardial effusions. Extensive calcification of the   aortic valve is present.    Aortic dimensions:  Mid ascending aorta (3.7 cm, series 5/159)  Aortic arch (2.7 cm, series 5/90)  Mid descending aorta (2.9 cm, series 5/122)  Distal descending aorta (2.7 cm, series 5/254)     Dilated main pulmonary artery segment (3.6 cm, series 5/138), exclude   pulmonary hypertension.      Abdomen: Post cholecystectomy??.    Bones and soft tissues: No acute osseous abnormalities. Generative   changes of the spine.    IMPRESSION:    Extensive calcification, aortic valve.    Normal dimensions thoracic aorta (see measurements above).    Dilated pulmonary artery, approximately 3.6 cm. Exclude pulmonary   hypertension.    No suspicious pulmonary parenchymal lesions.    Nonspecific multistation 6 enlarged mediastinal lymph nodes measuring up   to 1.4 cm.      CHERYL WARD M.D., RESIDENT RADIOLOGIST  This document has been electronically signed.  MICHAELLE NICOLE M.D., ATTENDING RADIOLOGIST  This document has been electronically signed. Aug  3 2018 11:39AM    EKG: Ventricular Rate 81 BPM    Atrial Rate 81 BPM    P-R Interval 164 ms    QRS Duration 102 ms    Q-T Interval 410 ms    QTC Calculation(Bezet) 476 ms    P Axis 44 degrees    R Axis 34 degrees    T Axis 185 degrees    Diagnosis Line Normal sinus rhythm  Left ventricular hypertrophy with repolarization abnormality  Abnormal ECG    Confirmed by GITA VALENTIN MD ([id number]),  Dilip Lewis ([id number]) on  8/1/2018 4:20:13 PM    Carotid Duplex:  EXAM:  CAROTID DUPLEX BILATERAL            PROCEDURE DATE:  08/02/2018      INTERPRETATION:  Clinical History / Reason for exam: The patient is   53-year-old male with carotid bruit.  The study was performed to evaluate   the patient for carotid artery stenosis.    Duplex evaluation of the carotid arteries was performed bilaterally.  In the right carotid system, the internal carotid artery is noted to be   patent with a peak systolic velocity of 58.3 cm/sec over end diastolic   velocity of 23.4 cm/sec.    In the left carotid system, the internal carotid artery is noted to be   patent with a peak systolic velocity of 58.4 cm/sec over end diastolic   velocity of 25.2 cm/sec.    The right vertebral arterial flow was antegrade.  The left vertebral arterial flow was antegrade.    Impression:    20-39% stenosis of the right internal carotid artery.  20-39% stenosis of the left internal carotid artery.    ICD-10: R09.89      PFT's:    Echocardiogram:   EXAM:  2-D ECHO (TTE) COMPLETE        PROCEDURE DATE:  08/02/2018      INTERPRETATION:  REPORT:    TRANSTHORACIC ECHOCARDIOGRAM REPORT         Patient Name:   JOCELYN SUNG Accession #: [id number]  Medical Rec #:  [id number]    Height:      74.0 in 188.0 cm  YOB: 1965    Weight:      219.0 lb 99.34 kg  Patient Age:    53 years    BSA:         2.26 m²  Patient Gender: M           BP:          112/84 mmHg       Date of Exam:        8/2/2018 7:11:38 AM  Referring Physician: [id number] ED UNASSIGNED  Sonographer:         Charanjit Iniguez  Reading Physician:   Nelson Kemp M.D.    Procedure:     2D Echo/Doppler/Color Doppler Complete.  Indications:   R07.9 - Chest Pain, unspecified  Diagnosis:     Other chest pain - R07.89  Study Details: Technically difficult study. Study quality was adversely   affected                 due to lung interference.         Summary:   1. Severely decreased global left ventricular systolic function.   2. Left atrial enlargement.   3. LV Ejection Fraction by Herrera's Method with a biplane EF of 26 %.   4. Mild mitral valve regurgitation.   5. Thickening of the anterior and posterior mitral valve leaflets.   6. Moderate-severe tricuspid regurgitation.   7. Mild to moderate aortic regurgitation.   8. Pulmonic valve regurgitation.   9. Estimated pulmonary artery systolic pressure is 72.9 mmHg assuming a   right atrial pressure of 15 mmHg, which is consistent with severe   pulmonary hypertension.  10. Peak transaortic gradient is 99.4 mmHg, mean transaortic gradient   equals 60.1 mmHg, the calculated aortic valve area equals 0.70 cm² by the   continuity equation consistent with severe aortic stenosis.    PHYSICIAN INTERPRETATION:  Left Ventricle: The left ventricular internal cavity size is normal. Left   ventricular wall thickness is normal. Global LV systolic function was   severely decreased. LV Ejection Fraction by Herrera's Method with a   biplane EF of 26 %.  Right Ventricle: Normal right ventricular size and function.  Left Atrium: Left atrial enlargement.  Right Atrium: Right atrial enlargement.  Pericardium: There is no evidence of pericardial effusion.  Mitral Valve: Mild mitral valve regurgitation is seen. Thickening of the   anterior and posterior mitral valve leaflets. No evidenceof mitral   stenosis.  Tricuspid Valve: Moderate-severe tricuspid regurgitation is visualized.   Estimated pulmonary artery systolic pressure is 72.9 mmHg assuming a   right atrial pressure of 15 mmHg, which is consistent with severe   pulmonary hypertension.  Aortic Valve: Mild to moderate aortic valve regurgitation is seen. Peak   Av velocity is 4.98 m/s, mean transaortic gradient equals 60.1 mmHg, the   calculated aortic valve area equals 0.70 cm² by the continuity equation   consistent with severe aortic stenosis.  Pulmonic Valve: Mild pulmonic valve regurgitation.  Aorta: The aortic root is normal in size and structure.  Venous: The inferior vena cava was dilated, with respiratory size   variation less than 50%, consistent with elevated right atrial pressure.       2D AND M-MODE MEASUREMENTS (normal ranges within parentheses):  Left Ventricle:                  Normal   Aorta/Left Atrium:               Normal  IVSd (2D):              1.30 cm (0.7-1.1) Left Atrium (Mmode): 4.68 cm   (1.9-4.0)  LVPWd (2D):             1.28 cm (0.7-1.1)  LVIDd (2D):             6.08 cm (3.4-5.7)  LVIDs (2D):             5.06 cm  LV FS (2D):             16.9 %   (>25%)  Relative Wall Thickness  0.42    (<0.42)    SPECTRAL DOPPLER ANALYSIS:  LV DIASTOLIC FUNCTION:  MV Peak E: 1.14 m/s Decel Time: 104 msec  MV Peak A: 0.38 m/s  E/A Ratio: 3.02    Aortic Valve:  AoV VMax:    4.98 m/s  AoV Area, Vmax:    0.71 cm² Vmax Indx:    0.32   cm²/m²  AoV VTI:     1.14 m    AoV Area, VTI:     0.70 cm² VTI Indx:    0.31   cm²/m²  AoV Pk Grad: 99.4 mmHg AoV Area, Mn Grad: 0.74 cm² Mn Grad Indx: 0.33   cm²/m²  AoV Mn Grad: 60.1 mmHg    LVOT Vmax: 0.77 m/s  LVOT VTI:  0.17 m  LVOT Diam: 2.43 cm    Mitral Valve:  MV P1/2 Time: 30.15 msec  MV Area, PHT: 7.30 cm²    Tricuspid Valve and PA/RV Systolic Pressure: TR Max Velocity: 3.80 m/s RA   Pressure: 15 mmHg RVSP/PASP: 72.9 mmHg       [id number] Nelson Kemp M.D., Electronically signed on 8/2/2018 at 9:46:22 AM     Cardiac catheterization: non-obstructive CAD    CHACHO:  FINDINGS  DIAGNOSIS/IMPRESSION:  dilated LA, intact IAS, dilated RA, clear JUJU  mildly thickened mitral valve, high EPSS due to severe cardiomyopathy and AI (limited and restricted leaflets separation), trace MR  calcified, structurally abnormal aortic valve with severe AS and severe AI with high gradiant across the aortic valve  moderate to severe TR attributed to pulmonary HTN due to AS/AI + systolic HF, tricuspid valve structure was normal  EF about 25%, normal aorta in ascending and descending      Gen: WN/WD NAD  Neuro: A&Ox3, nonfocal  Pulm: CTA B/L  CV: RRR, S1S2 +systolic murmur  Abd: Soft, NT, ND +BS  Ext: No edema, + peripheral pulses    Cardic Surgery Risk Factors  CVA and/or carotid/cerebrovascular disease. Yes  No  Explain if Yes  Aortoiliac disease Yes  No  Explain if Yes  Previous MI Yes  No  Explain if Yes  Previos Cardiac Surgery Yes  No  Explain if Yes  Hemodynamics-Unstable or Shock Yes  No  Explain if Yes  Diabetis Yes  No  Explain if Yes  Hepatic Failure Yes  No  Explain if Yes  Renal failure and/or dialysis Yes  No  Explain if Yes  Heart failure-type-present or past Yes  No  Explain if Yes  COPD Yes  No  Explain if Yes  Immune System Deficiency Yes  No  Explain if Yes  Malignant Ventricular Arrhythmia Yes  No  Explain if Yes    STS Score:     Pt has AICD/PPM [ ] Yes  [x ] No             Brand Name:  Pre-op Beta Blocker ordered within 24 hrs of surgery ([facility] ONLY)?  [x ] Yes  [ ] No  If not, Why?  Type & Cross  [ ] Yes  [ ] No  NPO after Midnight [x ] Yes  [ ] No  Pre-op ABX ordered, to be taped on chart:  [ x] Yes  [ ] No     Hibiclens/Peridex ordered [x ] Yes  [ ] No  Intraop on Hold: PRBCs, CXR, CHACHO [x ]   Consent obtained  [x ] Yes  [ ] No Cardiac Surgery Pre-op Note:    Consult requesting by: Dr. Kemp  Cardiologist: Dr. Simmons  PMD: Dr. Erwin    cc- I have shortness of breath      Allergies    penicillins (Unknown)    Intolerances      HPI:  This is a 54 YO M with PMH AFib s/p cardioversion in the ED in May and ablation years ago, rheumatic fever as a child, and valve disease (patient unaware of the specifics, but has been told by a physician his valve was "calcified"), who presents with shortness of breath for the last few days to week with exertion, as well as intermittent mild chest discomfort also with exertion, which patient initially attributed to gas pain. He underwent bedside cardioversion in the ED May 11th and was DC'd home to follow up with his cardiologist, but was not given any medications since his CHADS-Vasc was 0. When he went to cardio office today he was sent to the ED due to his symptoms and ECG that showed V4-V6 TWI and elevation in AVR. In ED STEMI code was called but then cancelled when prior ECGs were seen to be similar and was determined to be LVH with S-T repolarization abnormalities. Of note, patient reports he has had a negative stress test in the past as well.   Currently denies any symptoms, resting comfortably in bed.    ICU Vital Signs Last 24 Hrs  T(C): 36.7 (01 Aug 2018 18:04), Max: 36.7 (01 Aug 2018 18:04)  T(F): 98 (01 Aug 2018 18:04), Max: 98 (01 Aug 2018 18:04)  HR: 85 (01 Aug 2018 18:04) (84 - 85)  BP: 113/73 (01 Aug 2018 18:04) (113/73 - 113/75)  BP(mean): --  ABP: --  ABP(mean): --  RR: 19 (01 Aug 2018 18:04) (18 - 19)  SpO2: 100% (01 Aug 2018 18:04) (99% - 100%) (01 Aug 2018 20:16)  right bp 84/70 left arm 87/60    PAST MEDICAL & SURGICAL HISTORY:  Rheumatic fever in pediatric patient  Afib  H/O right knee surgery  H/O left knee surgery  H/O prior ablation treatment  History of cardioversion      MEDICATIONS  (STANDING):  aspirin enteric coated 81 milliGRAM(s) Oral daily  atorvastatin 80 milliGRAM(s) Oral at bedtime  enoxaparin Injectable 40 milliGRAM(s) SubCutaneous every 24 hours  famotidine    Tablet 20 milliGRAM(s) Oral two times a day    MEDICATIONS  (PRN):  acetaminophen   Tablet. 650 milliGRAM(s) Oral every 6 hours PRN Mild Pain (1 - 3)      Labs:                        13.7   9.19  )-----------( 250      ( 06 Aug 2018 02:15 )             40.8     08-06    140  |  101  |  19  ----------------------------<  100<H>  4.3   |  28  |  0.9    Ca    9.3      06 Aug 2018 02:15      LFT's WNL      PT/INR - ( 06 Aug 2018 02:15 )   PT: 12.50 sec;   INR: 1.16 ratio         PTT - ( 06 Aug 2018 02:15 )  PTT:31.8 sec    Blood Type:   HGB A1C: Hemoglobin A1C, Whole Blood: 5.2 % (08-03 @ 04:00)    Prealbumin:   Pro-BNP: Serum Pro-Brain Natriuretic Peptide: 8134 pg/mL (08-03 @ 04:00)    MRSA:  / MSSA: pending    CXR: EXAM:  XR CHEST PORTABLE IMMED 1V            PROCEDURE DATE:  08/01/2018      INTERPRETATION:  Clinical History / Reason for exam: Chest pain.    Comparison : Chest radiograph dated May 11, 2018.    Technique/Positioning: Satisfactory.    Findings:    Support devices: None.    Cardiac/mediastinum/hilum: Unremarkable.    Lung parenchyma/Pleura: Within normal limits.    Skeleton/soft tissues: No acute osseous abnormality.    Impression:      No radiographic evidence of acute cardiopulmonarydisease.      EXAM:  CT CHEST            PROCEDURE DATE:  08/02/2018      INTERPRETATION:  Reason for Exam:  Aortic stenosis. Preoperative for   aortic valve replacement.    CT of the chest was performed from the thoracic inlet to the level of the   adrenal glands without contrast injection. Coronal and sagittal images   have been submitted.    Comparison: Chest x-ray 8/1/2018.    Findings:     Tubes/Lines: None.    Lungs, Pleura, and Airways:No focal consolidation, mass, pleural effusion   or pneumothorax. Major airways are patent.    Incompletely formed left major fissure is noted.    Punctate calcified granuloma, left upper lobe (series 5/172) No   suspicious pulmonary nodules.     Mediastinum/Lymph Nodes: Multistation enlarged mediastinal lymphnodes   measuring up to 1.4 cm short axis diameter (right paratracheal   compartment series 5/99).    Heart/Great Vessels:     No concentric pericardial effusions. Extensive calcification of the   aortic valve is present.    Aortic dimensions:  Mid ascending aorta (3.7 cm, series 5/159)  Aortic arch (2.7 cm, series 5/90)  Mid descending aorta (2.9 cm, series 5/122)  Distal descending aorta (2.7 cm, series 5/254)     Dilated main pulmonary artery segment (3.6 cm, series 5/138), exclude   pulmonary hypertension.      Abdomen: Post cholecystectomy??.    Bones and soft tissues: No acute osseous abnormalities. Generative   changes of the spine.    IMPRESSION:    Extensive calcification, aortic valve.    Normal dimensions thoracic aorta (see measurements above).    Dilated pulmonary artery, approximately 3.6 cm. Exclude pulmonary   hypertension.    No suspicious pulmonary parenchymal lesions.    Nonspecific multistation 6 enlarged mediastinal lymph nodes measuring up   to 1.4 cm.      CHERYL WARD M.D., RESIDENT RADIOLOGIST  This document has been electronically signed.  MICHAELLE NICOLE M.D., ATTENDING RADIOLOGIST  This document has been electronically signed. Aug  3 2018 11:39AM    EKG: Ventricular Rate 81 BPM    Atrial Rate 81 BPM    P-R Interval 164 ms    QRS Duration 102 ms    Q-T Interval 410 ms    QTC Calculation(Bezet) 476 ms    P Axis 44 degrees    R Axis 34 degrees    T Axis 185 degrees    Diagnosis Line Normal sinus rhythm  Left ventricular hypertrophy with repolarization abnormality  Abnormal ECG    Confirmed by GITA VALENTIN MD ([id number]),  Dilip Lewis ([id number]) on  8/1/2018 4:20:13 PM    Carotid Duplex:  EXAM:  CAROTID DUPLEX BILATERAL            PROCEDURE DATE:  08/02/2018      INTERPRETATION:  Clinical History / Reason for exam: The patient is   53-year-old male with carotid bruit.  The study was performed to evaluate   the patient for carotid artery stenosis.    Duplex evaluation of the carotid arteries was performed bilaterally.  In the right carotid system, the internal carotid artery is noted to be   patent with a peak systolic velocity of 58.3 cm/sec over end diastolic   velocity of 23.4 cm/sec.    In the left carotid system, the internal carotid artery is noted to be   patent with a peak systolic velocity of 58.4 cm/sec over end diastolic   velocity of 25.2 cm/sec.    The right vertebral arterial flow was antegrade.  The left vertebral arterial flow was antegrade.    Impression:    20-39% stenosis of the right internal carotid artery.  20-39% stenosis of the left internal carotid artery.    ICD-10: R09.89    UA  Urinalysis (08.03.18 @ 05:40)    Glucose Qualitative, Urine: Negative mg/dL    Blood, Urine: Small    pH Urine: 6.0    Color: Yellow    Urine Appearance: Cloudy    Bilirubin: Negative    Ketone - Urine: Negative    Specific Gravity: 1.020    Protein, Urine: 30 mg/dL    Urobilinogen: 1.0 mg/dL    Nitrite: Negative    Leukocyte Esterase Concentration: Negative      PFT's:    EXAM:  2-D ECHO (TTE) COMPLETE        PROCEDURE DATE:  08/02/2018      INTERPRETATION:  REPORT:    TRANSTHORACIC ECHOCARDIOGRAM REPORT         Patient Name:   JOCELYN SUNG Accession #: [id number]  Medical Rec #:  [id number]    Height:      74.0 in 188.0 cm  YOB: 1965    Weight:      219.0 lb 99.34 kg  Patient Age:    53 years    BSA:         2.26 m²  Patient Gender: M           BP:          112/84 mmHg       Date of Exam:        8/2/2018 7:11:38 AM  Referring Physician: [id number] ED UNASSIGNED  Sonographer:         Charanjit Iniguez  Reading Physician:   Nelson Kemp M.D.    Procedure:     2D Echo/Doppler/Color Doppler Complete.  Indications:   R07.9 - Chest Pain, unspecified  Diagnosis:     Other chest pain - R07.89  Study Details: Technically difficult study. Study quality was adversely   affected                 due to lung interference.         Summary:   1. Severely decreased global left ventricular systolic function.   2. Left atrial enlargement.   3. LV Ejection Fraction by Herrera's Method with a biplane EF of 26 %.   4. Mild mitral valve regurgitation.   5. Thickening of the anterior and posterior mitral valve leaflets.   6. Moderate-severe tricuspid regurgitation.   7. Mild to moderate aortic regurgitation.   8. Pulmonic valve regurgitation.   9. Estimated pulmonary artery systolic pressure is 72.9 mmHg assuming a   right atrial pressure of 15 mmHg, which is consistent with severe   pulmonary hypertension.  10. Peak transaortic gradient is 99.4 mmHg, mean transaortic gradient   equals 60.1 mmHg, the calculated aortic valve area equals 0.70 cm² by the   continuity equation consistent with severe aortic stenosis.    PHYSICIAN INTERPRETATION:  Left Ventricle: The left ventricular internal cavity size is normal. Left   ventricular wall thickness is normal. Global LV systolic function was   severely decreased. LV Ejection Fraction by Herrera's Method with a   biplane EF of 26 %.  Right Ventricle: Normal right ventricular size and function.  Left Atrium: Left atrial enlargement.  Right Atrium: Right atrial enlargement.  Pericardium: There is no evidence of pericardial effusion.  Mitral Valve: Mild mitral valve regurgitation is seen. Thickening of the   anterior and posterior mitral valve leaflets. No evidenceof mitral   stenosis.  Tricuspid Valve: Moderate-severe tricuspid regurgitation is visualized.   Estimated pulmonary artery systolic pressure is 72.9 mmHg assuming a   right atrial pressure of 15 mmHg, which is consistent with severe   pulmonary hypertension.  Aortic Valve: Mild to moderate aortic valve regurgitation is seen. Peak   Av velocity is 4.98 m/s, mean transaortic gradient equals 60.1 mmHg, the   calculated aortic valve area equals 0.70 cm² by the continuity equation   consistent with severe aortic stenosis.  Pulmonic Valve: Mild pulmonic valve regurgitation.  Aorta: The aortic root is normal in size and structure.  Venous: The inferior vena cava was dilated, with respiratory size   variation less than 50%, consistent with elevated right atrial pressure.       2D AND M-MODE MEASUREMENTS (normal ranges within parentheses):  Left Ventricle:                  Normal   Aorta/Left Atrium:               Normal  IVSd (2D):              1.30 cm (0.7-1.1) Left Atrium (Mmode): 4.68 cm   (1.9-4.0)  LVPWd (2D):             1.28 cm (0.7-1.1)  LVIDd (2D):             6.08 cm (3.4-5.7)  LVIDs (2D):             5.06 cm  LV FS (2D):             16.9 %   (>25%)  Relative Wall Thickness  0.42    (<0.42)    SPECTRAL DOPPLER ANALYSIS:  LV DIASTOLIC FUNCTION:  MV Peak E: 1.14 m/s Decel Time: 104 msec  MV Peak A: 0.38 m/s  E/A Ratio: 3.02    Aortic Valve:  AoV VMax:    4.98 m/s  AoV Area, Vmax:    0.71 cm² Vmax Indx:    0.32   cm²/m²  AoV VTI:     1.14 m    AoV Area, VTI:     0.70 cm² VTI Indx:    0.31   cm²/m²  AoV Pk Grad: 99.4 mmHg AoV Area, Mn Grad: 0.74 cm² Mn Grad Indx: 0.33   cm²/m²  AoV Mn Grad: 60.1 mmHg    LVOT Vmax: 0.77 m/s  LVOT VTI:  0.17 m  LVOT Diam: 2.43 cm    Mitral Valve:  MV P1/2 Time: 30.15 msec  MV Area, PHT: 7.30 cm²    Tricuspid Valve and PA/RV Systolic Pressure: TR Max Velocity: 3.80 m/s RA   Pressure: 15 mmHg RVSP/PASP: 72.9 mmHg       [id number] Nelson Kemp M.D., Electronically signed on 8/2/2018 at 9:46:22 AM     Cardiac catheterization: non-obstructive CAD    CHACHO:  FINDINGS  DIAGNOSIS/IMPRESSION:  dilated LA, intact IAS, dilated RA, clear JUJU  mildly thickened mitral valve, high EPSS due to severe cardiomyopathy and AI (limited and restricted leaflets separation), trace MR  calcified, structurally abnormal aortic valve with severe AS and severe AI with high gradiant across the aortic valve  moderate to severe TR attributed to pulmonary HTN due to AS/AI + systolic HF, tricuspid valve structure was normal  EF about 25%, normal aorta in ascending and descending      Gen: WN/WD NAD  Neuro: A&Ox3, nonfocal  Pulm: CTA B/L  CV: RRR, S1S2 +systolic murmur  Abd: Soft, NT, ND +BS  Ext: No edema, + peripheral pulses    Cardic Surgery Risk Factors  CVA and/or carotid/cerebrovascular disease. Yes  No  Explain if Yes  Aortoiliac disease Yes  No  Explain if Yes  Previous MI Yes  No  Explain if Yes  Previous Cardiac Surgery Yes  No  Explain if Yes  Hemodynamics-Unstable or Shock Yes  No  Explain if Yes  Diabetis Yes  No  Explain if Yes  Hepatic Failure Yes  No  Explain if Yes-- low EF   Renal failure and/or dialysis Yes  No  Explain if Yes  Heart failure-type-present or past Yes  No  Explain if Yes  COPD Yes  No  Explain if Yes  Immune System Deficiency Yes  No  Explain if Yes  Malignant Ventricular Arrhythmia Yes  No  Explain if Yes    STS Score: Risk of Mortality: 1.469%    Morbidity or Mortality: 15.626%    Long Length of Stay: 5.788%    Short Length of Stay: 37.446%    Permanent Stroke: 0.497%    Prolonged Ventilation: 9.378%    DSW Infection: 0.268%    Renal Failure: 1.911%    Reoperation: 8.078%    Pt has AICD/PPM [ ] Yes  [x ] No             Brand Name:  Pre-op Beta Blocker ordered within 24 hrs of surgery (CABG ONLY)?  [ ] Yes  [ ] No  If not, Why?  Type & Cross  [ ] Yes  [ ] No  NPO after Midnight [x ] Yes  [ ] No  Pre-op ABX ordered, to be taped on chart:  [ x] Yes  [ ] No     Hibiclens/Peridex ordered [x ] Yes  [ ] No  Intraop on Hold: PRBCs, CXR, CHACHO [x ]   Consent obtained  [ ] Yes  [ ] No Cardiac Surgery Pre-op Note:    Consult requesting by: Dr. Kemp  Cardiologist: Dr. Simmons  PMD: Dr. Erwin    cc- I have shortness of breath      Allergies    penicillins (Unknown)    Intolerances      HPI:  This is a 54 YO M with PMH AFib s/p cardioversion in the ED in May and ablation years ago, rheumatic fever as a child, and valve disease (patient unaware of the specifics, but has been told by a physician his valve was "calcified"), who presents with shortness of breath for the last few days to week with exertion, as well as intermittent mild chest discomfort also with exertion, which patient initially attributed to gas pain. He underwent bedside cardioversion in the ED May 11th and was DC'd home to follow up with his cardiologist, but was not given any medications since his CHADS-Vasc was 0. When he went to cardio office today he was sent to the ED due to his symptoms and ECG that showed V4-V6 TWI and elevation in AVR. In ED STEMI code was called but then cancelled when prior ECGs were seen to be similar and was determined to be LVH with S-T repolarization abnormalities. Of note, patient reports he has had a negative stress test in the past as well.   Currently denies any symptoms, resting comfortably in bed.    ICU Vital Signs Last 24 Hrs  T(C): 36.7 (01 Aug 2018 18:04), Max: 36.7 (01 Aug 2018 18:04)  T(F): 98 (01 Aug 2018 18:04), Max: 98 (01 Aug 2018 18:04)  HR: 85 (01 Aug 2018 18:04) (84 - 85)  BP: 113/73 (01 Aug 2018 18:04) (113/73 - 113/75)  BP(mean): --  ABP: --  ABP(mean): --  RR: 19 (01 Aug 2018 18:04) (18 - 19)  SpO2: 100% (01 Aug 2018 18:04) (99% - 100%) (01 Aug 2018 20:16)  right bp 84/70 left arm 87/60    PAST MEDICAL & SURGICAL HISTORY:  Rheumatic fever in pediatric patient  Afib  H/O right knee surgery  H/O left knee surgery  H/O prior ablation treatment  History of cardioversion      MEDICATIONS  (STANDING):  aspirin enteric coated 81 milliGRAM(s) Oral daily  atorvastatin 80 milliGRAM(s) Oral at bedtime  enoxaparin Injectable 40 milliGRAM(s) SubCutaneous every 24 hours  famotidine    Tablet 20 milliGRAM(s) Oral two times a day    MEDICATIONS  (PRN):  acetaminophen   Tablet. 650 milliGRAM(s) Oral every 6 hours PRN Mild Pain (1 - 3)      Labs:                        13.7   9.19  )-----------( 250      ( 06 Aug 2018 02:15 )             40.8     08-06    140  |  101  |  19  ----------------------------<  100<H>  4.3   |  28  |  0.9    Ca    9.3      06 Aug 2018 02:15      LFT's WNL      PT/INR - ( 06 Aug 2018 02:15 )   PT: 12.50 sec;   INR: 1.16 ratio         PTT - ( 06 Aug 2018 02:15 )  PTT:31.8 sec    Blood Type:   HGB A1C: Hemoglobin A1C, Whole Blood: 5.2 % (08-03 @ 04:00)    Prealbumin:   Pro-BNP: Serum Pro-Brain Natriuretic Peptide: 8134 pg/mL (08-03 @ 04:00)    MRSA:  / MSSA: pending    CXR: EXAM:  XR CHEST PORTABLE IMMED 1V            PROCEDURE DATE:  08/01/2018      INTERPRETATION:  Clinical History / Reason for exam: Chest pain.    Comparison : Chest radiograph dated May 11, 2018.    Technique/Positioning: Satisfactory.    Findings:    Support devices: None.    Cardiac/mediastinum/hilum: Unremarkable.    Lung parenchyma/Pleura: Within normal limits.    Skeleton/soft tissues: No acute osseous abnormality.    Impression:      No radiographic evidence of acute cardiopulmonarydisease.      EXAM:  CT CHEST            PROCEDURE DATE:  08/02/2018      INTERPRETATION:  Reason for Exam:  Aortic stenosis. Preoperative for   aortic valve replacement.    CT of the chest was performed from the thoracic inlet to the level of the   adrenal glands without contrast injection. Coronal and sagittal images   have been submitted.    Comparison: Chest x-ray 8/1/2018.    Findings:     Tubes/Lines: None.    Lungs, Pleura, and Airways:No focal consolidation, mass, pleural effusion   or pneumothorax. Major airways are patent.    Incompletely formed left major fissure is noted.    Punctate calcified granuloma, left upper lobe (series 5/172) No   suspicious pulmonary nodules.     Mediastinum/Lymph Nodes: Multistation enlarged mediastinal lymphnodes   measuring up to 1.4 cm short axis diameter (right paratracheal   compartment series 5/99).    Heart/Great Vessels:     No concentric pericardial effusions. Extensive calcification of the   aortic valve is present.    Aortic dimensions:  Mid ascending aorta (3.7 cm, series 5/159)  Aortic arch (2.7 cm, series 5/90)  Mid descending aorta (2.9 cm, series 5/122)  Distal descending aorta (2.7 cm, series 5/254)     Dilated main pulmonary artery segment (3.6 cm, series 5/138), exclude   pulmonary hypertension.      Abdomen: Post cholecystectomy??.    Bones and soft tissues: No acute osseous abnormalities. Generative   changes of the spine.    IMPRESSION:    Extensive calcification, aortic valve.    Normal dimensions thoracic aorta (see measurements above).    Dilated pulmonary artery, approximately 3.6 cm. Exclude pulmonary   hypertension.    No suspicious pulmonary parenchymal lesions.    Nonspecific multistation 6 enlarged mediastinal lymph nodes measuring up   to 1.4 cm.      CHERYL WARD M.D., RESIDENT RADIOLOGIST  This document has been electronically signed.  MICHAELLE NICOLE M.D., ATTENDING RADIOLOGIST  This document has been electronically signed. Aug  3 2018 11:39AM    EKG: Ventricular Rate 81 BPM    Atrial Rate 81 BPM    P-R Interval 164 ms    QRS Duration 102 ms    Q-T Interval 410 ms    QTC Calculation(Bezet) 476 ms    P Axis 44 degrees    R Axis 34 degrees    T Axis 185 degrees    Diagnosis Line Normal sinus rhythm  Left ventricular hypertrophy with repolarization abnormality  Abnormal ECG    Confirmed by GITA VALENTIN MD [id number]),  Dilip Lewis ([id number]) on  8/1/2018 4:20:13 PM    Carotid Duplex:  EXAM:  CAROTID DUPLEX BILATERAL            PROCEDURE DATE:  08/02/2018      INTERPRETATION:  Clinical History / Reason for exam: The patient is   53-year-old male with carotid bruit.  The study was performed to evaluate   the patient for carotid artery stenosis.    Duplex evaluation of the carotid arteries was performed bilaterally.  In the right carotid system, the internal carotid artery is noted to be   patent with a peak systolic velocity of 58.3 cm/sec over end diastolic   velocity of 23.4 cm/sec.    In the left carotid system, the internal carotid artery is noted to be   patent with a peak systolic velocity of 58.4 cm/sec over end diastolic   velocity of 25.2 cm/sec.    The right vertebral arterial flow was antegrade.  The left vertebral arterial flow was antegrade.    Impression:    20-39% stenosis of the right internal carotid artery.  20-39% stenosis of the left internal carotid artery.    ICD-10: R09.89    UA  Urinalysis (08.03.18 @ 05:40)    Glucose Qualitative, Urine: Negative mg/dL    Blood, Urine: Small    pH Urine: 6.0    Color: Yellow    Urine Appearance: Cloudy    Bilirubin: Negative    Ketone - Urine: Negative    Specific Gravity: 1.020    Protein, Urine: 30 mg/dL    Urobilinogen: 1.0 mg/dL    Nitrite: Negative    Leukocyte Esterase Concentration: Negative      PFT's: FEV1 2.29, 52% predicted      EXAM:  2-D ECHO (TTE) COMPLETE        PROCEDURE DATE:  08/02/2018      INTERPRETATION:  REPORT:    TRANSTHORACIC ECHOCARDIOGRAM REPORT         Patient Name:   JOCELYN SUNG Accession #: [id number]  Medical Rec #:  [id number]    Height:      74.0 in 188.0 cm  YOB: 1965    Weight:      219.0 lb 99.34 kg  Patient Age:    53 years    BSA:         2.26 m²  Patient Gender: M           BP:          112/84 mmHg       Date of Exam:        8/2/2018 7:11:38 AM  Referring Physician: [id number] ED UNASSIGNED  Sonographer:         Charanjit Iniguez  Reading Physician:   Nelson Kemp M.D.    Procedure:     2D Echo/Doppler/Color Doppler Complete.  Indications:   R07.9 - Chest Pain, unspecified  Diagnosis:     Other chest pain - R07.89  Study Details: Technically difficult study. Study quality was adversely   affected                 due to lung interference.         Summary:   1. Severely decreased global left ventricular systolic function.   2. Left atrial enlargement.   3. LV Ejection Fraction by Herrera's Method with a biplane EF of 26 %.   4. Mild mitral valve regurgitation.   5. Thickening of the anterior and posterior mitral valve leaflets.   6. Moderate-severe tricuspid regurgitation.   7. Mild to moderate aortic regurgitation.   8. Pulmonic valve regurgitation.   9. Estimated pulmonary artery systolic pressure is 72.9 mmHg assuming a   right atrial pressure of 15 mmHg, which is consistent with severe   pulmonary hypertension.  10. Peak transaortic gradient is 99.4 mmHg, mean transaortic gradient   equals 60.1 mmHg, the calculated aortic valve area equals 0.70 cm² by the   continuity equation consistent with severe aortic stenosis.    PHYSICIAN INTERPRETATION:  Left Ventricle: The left ventricular internal cavity size is normal. Left   ventricular wall thickness is normal. Global LV systolic function was   severely decreased. LV Ejection Fraction by Herrera's Method with a   biplane EF of 26 %.  Right Ventricle: Normal right ventricular size and function.  Left Atrium: Left atrial enlargement.  Right Atrium: Right atrial enlargement.  Pericardium: There is no evidence of pericardial effusion.  Mitral Valve: Mild mitral valve regurgitation is seen. Thickening of the   anterior and posterior mitral valve leaflets. No evidenceof mitral   stenosis.  Tricuspid Valve: Moderate-severe tricuspid regurgitation is visualized.   Estimated pulmonary artery systolic pressure is 72.9 mmHg assuming a   right atrial pressure of 15 mmHg, which is consistent with severe   pulmonary hypertension.  Aortic Valve: Mild to moderate aortic valve regurgitation is seen. Peak   Av velocity is 4.98 m/s, mean transaortic gradient equals 60.1 mmHg, the   calculated aortic valve area equals 0.70 cm² by the continuity equation   consistent with severe aortic stenosis.  Pulmonic Valve: Mild pulmonic valve regurgitation.  Aorta: The aortic root is normal in size and structure.  Venous: The inferior vena cava was dilated, with respiratory size   variation less than 50%, consistent with elevated right atrial pressure.       2D AND M-MODE MEASUREMENTS (normal ranges within parentheses):  Left Ventricle:                  Normal   Aorta/Left Atrium:               Normal  IVSd (2D):              1.30 cm (0.7-1.1) Left Atrium (Mmode): 4.68 cm   (1.9-4.0)  LVPWd (2D):             1.28 cm (0.7-1.1)  LVIDd (2D):             6.08 cm (3.4-5.7)  LVIDs (2D):             5.06 cm  LV FS (2D):             16.9 %   (>25%)  Relative Wall Thickness  0.42    (<0.42)    SPECTRAL DOPPLER ANALYSIS:  LV DIASTOLIC FUNCTION:  MV Peak E: 1.14 m/s Decel Time: 104 msec  MV Peak A: 0.38 m/s  E/A Ratio: 3.02    Aortic Valve:  AoV VMax:    4.98 m/s  AoV Area, Vmax:    0.71 cm² Vmax Indx:    0.32   cm²/m²  AoV VTI:     1.14 m    AoV Area, VTI:     0.70 cm² VTI Indx:    0.31   cm²/m²  AoV Pk Grad: 99.4 mmHg AoV Area, Mn Grad: 0.74 cm² Mn Grad Indx: 0.33   cm²/m²  AoV Mn Grad: 60.1 mmHg    LVOT Vmax: 0.77 m/s  LVOT VTI:  0.17 m  LVOT Diam: 2.43 cm    Mitral Valve:  MV P1/2 Time: 30.15 msec  MV Area, PHT: 7.30 cm²    Tricuspid Valve and PA/RV Systolic Pressure: TR Max Velocity: 3.80 m/s RA   Pressure: 15 mmHg RVSP/PASP: 72.9 mmHg       [id number] Nelson Kemp M.D., Electronically signed on 8/2/2018 at 9:46:22 AM     Cardiac catheterization: non-obstructive CAD    CHACHO:  FINDINGS  DIAGNOSIS/IMPRESSION:  dilated LA, intact IAS, dilated RA, clear JUJU  mildly thickened mitral valve, high EPSS due to severe cardiomyopathy and AI (limited and restricted leaflets separation), trace MR  calcified, structurally abnormal aortic valve with severe AS and severe AI with high gradiant across the aortic valve  moderate to severe TR attributed to pulmonary HTN due to AS/AI + systolic HF, tricuspid valve structure was normal  EF about 25%, normal aorta in ascending and descending      Gen: WN/WD NAD  Neuro: A&Ox3, nonfocal  Pulm: CTA B/L  CV: RRR, S1S2 +systolic murmur  Abd: Soft, NT, ND +BS  Ext: No edema, + peripheral pulses    Cardic Surgery Risk Factors  CVA and/or carotid/cerebrovascular disease. Yes  No  Explain if Yes  Aortoiliac disease Yes  No  Explain if Yes  Previous MI Yes  No  Explain if Yes  Previous Cardiac Surgery Yes  No  Explain if Yes  Hemodynamics-Unstable or Shock Yes  No  Explain if Yes  Diabetis Yes  No  Explain if Yes  Hepatic Failure Yes  No  Explain if Yes-- low EF   Renal failure and/or dialysis Yes  No  Explain if Yes  Heart failure-type-present or past Yes  No  Explain if Yes  COPD Yes  No  Explain if Yes  Immune System Deficiency Yes  No  Explain if Yes  Malignant Ventricular Arrhythmia Yes  No  Explain if Yes    STS Score: Risk of Mortality: 2.346%    Morbidity or Mortality: 23.575%    Long Length of Stay: 10.659%    Short Length of Stay: 24.345%    Permanent Stroke: 0.497%    Prolonged Ventilation: 15.68%    DSW Infection: 0.431%    Renal Failure: 3.006%    Reoperation: 10.211%    Pt has AICD/PPM [ ] Yes  [x ] No             Brand Name:  Pre-op Beta Blocker ordered within 24 hrs of surgery (CABG ONLY)?  [ ] Yes  [ ] No  If not, Why?  Type & Cross  [ ] Yes  [ ] No  NPO after Midnight [x ] Yes  [ ] No  Pre-op ABX ordered, to be taped on chart:  [ x] Yes  [ ] No     Hibiclens/Peridex ordered [x ] Yes  [ ] No  Intraop on Hold: PRBCs, CXR, CHACHO [x ]   Consent obtained  [ ] Yes  [ ] No Cardiac Surgery Pre-op Note:    Consult requesting by: Dr. Kemp  Cardiologist: Dr. Simmons  PMD: Dr. Erwin    cc- I have shortness of breath      Allergies    penicillins (Unknown)    Intolerances      HPI:  This is a 52 YO M with PMH AFib s/p cardioversion in the ED in May and ablation years ago, rheumatic fever as a child, and valve disease (patient unaware of the specifics, but has been told by a physician his valve was "calcified"), who presents with shortness of breath for the last few days to week with exertion, as well as intermittent mild chest discomfort also with exertion, which patient initially attributed to gas pain. He underwent bedside cardioversion in the ED May 11th and was DC'd home to follow up with his cardiologist, but was not given any medications since his CHADS-Vasc was 0. When he went to cardio office today he was sent to the ED due to his symptoms and ECG that showed V4-V6 TWI and elevation in AVR. In ED STEMI code was called but then cancelled when prior ECGs were seen to be similar and was determined to be LVH with S-T repolarization abnormalities. Of note, patient reports he has had a negative stress test in the past as well.   Currently denies any symptoms, resting comfortably in bed.    ICU Vital Signs Last 24 Hrs  T(C): 36.7 (01 Aug 2018 18:04), Max: 36.7 (01 Aug 2018 18:04)  T(F): 98 (01 Aug 2018 18:04), Max: 98 (01 Aug 2018 18:04)  HR: 85 (01 Aug 2018 18:04) (84 - 85)  BP: 113/73 (01 Aug 2018 18:04) (113/73 - 113/75)  BP(mean): --  ABP: --  ABP(mean): --  RR: 19 (01 Aug 2018 18:04) (18 - 19)  SpO2: 100% (01 Aug 2018 18:04) (99% - 100%) (01 Aug 2018 20:16)  right bp 84/70 left arm 87/60    PAST MEDICAL & SURGICAL HISTORY:  Rheumatic fever in pediatric patient  Afib  H/O right knee surgery  H/O left knee surgery  H/O prior ablation treatment  History of cardioversion      MEDICATIONS  (STANDING):  aspirin enteric coated 81 milliGRAM(s) Oral daily  atorvastatin 80 milliGRAM(s) Oral at bedtime  enoxaparin Injectable 40 milliGRAM(s) SubCutaneous every 24 hours  famotidine    Tablet 20 milliGRAM(s) Oral two times a day    MEDICATIONS  (PRN):  acetaminophen   Tablet. 650 milliGRAM(s) Oral every 6 hours PRN Mild Pain (1 - 3)      Labs:                        13.7   9.19  )-----------( 250      ( 06 Aug 2018 02:15 )             40.8     08-06    140  |  101  |  19  ----------------------------<  100<H>  4.3   |  28  |  0.9    Ca    9.3      06 Aug 2018 02:15      LFT's WNL      PT/INR - ( 06 Aug 2018 02:15 )   PT: 12.50 sec;   INR: 1.16 ratio         PTT - ( 06 Aug 2018 02:15 )  PTT:31.8 sec    Blood Type:   HGB A1C: Hemoglobin A1C, Whole Blood: 5.2 % (08-03 @ 04:00)    Prealbumin:   Pro-BNP: Serum Pro-Brain Natriuretic Peptide: 8134 pg/mL (08-03 @ 04:00)    MRSA:  / MSSA: pending    CXR: EXAM:  XR CHEST PORTABLE IMMED 1V            PROCEDURE DATE:  08/01/2018      INTERPRETATION:  Clinical History / Reason for exam: Chest pain.    Comparison : Chest radiograph dated May 11, 2018.    Technique/Positioning: Satisfactory.    Findings:    Support devices: None.    Cardiac/mediastinum/hilum: Unremarkable.    Lung parenchyma/Pleura: Within normal limits.    Skeleton/soft tissues: No acute osseous abnormality.    Impression:      No radiographic evidence of acute cardiopulmonarydisease.      EXAM:  CT CHEST            PROCEDURE DATE:  08/02/2018      INTERPRETATION:  Reason for Exam:  Aortic stenosis. Preoperative for   aortic valve replacement.    CT of the chest was performed from the thoracic inlet to the level of the   adrenal glands without contrast injection. Coronal and sagittal images   have been submitted.    Comparison: Chest x-ray 8/1/2018.    Findings:     Tubes/Lines: None.    Lungs, Pleura, and Airways:No focal consolidation, mass, pleural effusion   or pneumothorax. Major airways are patent.    Incompletely formed left major fissure is noted.    Punctate calcified granuloma, left upper lobe (series 5/172) No   suspicious pulmonary nodules.     Mediastinum/Lymph Nodes: Multistation enlarged mediastinal lymphnodes   measuring up to 1.4 cm short axis diameter (right paratracheal   compartment series 5/99).    Heart/Great Vessels:     No concentric pericardial effusions. Extensive calcification of the   aortic valve is present.    Aortic dimensions:  Mid ascending aorta (3.7 cm, series 5/159)  Aortic arch (2.7 cm, series 5/90)  Mid descending aorta (2.9 cm, series 5/122)  Distal descending aorta (2.7 cm, series 5/254)     Dilated main pulmonary artery segment (3.6 cm, series 5/138), exclude   pulmonary hypertension.      Abdomen: Post cholecystectomy??.    Bones and soft tissues: No acute osseous abnormalities. Generative   changes of the spine.    IMPRESSION:    Extensive calcification, aortic valve.    Normal dimensions thoracic aorta (see measurements above).    Dilated pulmonary artery, approximately 3.6 cm. Exclude pulmonary   hypertension.    No suspicious pulmonary parenchymal lesions.    Nonspecific multistation 6 enlarged mediastinal lymph nodes measuring up   to 1.4 cm.      CHERYL WARD M.D., RESIDENT RADIOLOGIST  This document has been electronically signed.  MICHAELLE NICOLE M.D., ATTENDING RADIOLOGIST  This document has been electronically signed. Aug  3 2018 11:39AM    EKG: Ventricular Rate 81 BPM    Atrial Rate 81 BPM    P-R Interval 164 ms    QRS Duration 102 ms    Q-T Interval 410 ms    QTC Calculation(Bezet) 476 ms    P Axis 44 degrees    R Axis 34 degrees    T Axis 185 degrees    Diagnosis Line Normal sinus rhythm  Left ventricular hypertrophy with repolarization abnormality  Abnormal ECG    Confirmed by GITA VALENTIN MD ([id number]),  Dilip Lewis ([id number]) on  8/1/2018 4:20:13 PM    Carotid Duplex:  EXAM:  CAROTID DUPLEX BILATERAL            PROCEDURE DATE:  08/02/2018      INTERPRETATION:  Clinical History / Reason for exam: The patient is   53-year-old male with carotid bruit.  The study was performed to evaluate   the patient for carotid artery stenosis.    Duplex evaluation of the carotid arteries was performed bilaterally.  In the right carotid system, the internal carotid artery is noted to be   patent with a peak systolic velocity of 58.3 cm/sec over end diastolic   velocity of 23.4 cm/sec.    In the left carotid system, the internal carotid artery is noted to be   patent with a peak systolic velocity of 58.4 cm/sec over end diastolic   velocity of 25.2 cm/sec.    The right vertebral arterial flow was antegrade.  The left vertebral arterial flow was antegrade.    Impression:    20-39% stenosis of the right internal carotid artery.  20-39% stenosis of the left internal carotid artery.    ICD-10: R09.89    UA  Urinalysis (08.03.18 @ 05:40)    Glucose Qualitative, Urine: Negative mg/dL    Blood, Urine: Small    pH Urine: 6.0    Color: Yellow    Urine Appearance: Cloudy    Bilirubin: Negative    Ketone - Urine: Negative    Specific Gravity: 1.020    Protein, Urine: 30 mg/dL    Urobilinogen: 1.0 mg/dL    Nitrite: Negative    Leukocyte Esterase Concentration: Negative      PFT's: FEV1 2.29, 52% predicted      EXAM:  2-D ECHO (TTE) COMPLETE        PROCEDURE DATE:  08/02/2018      INTERPRETATION:  REPORT:    TRANSTHORACIC ECHOCARDIOGRAM REPORT         Patient Name:   JOCELYN SUNG Accession #: [id number]  Medical Rec #:  [id number]    Height:      74.0 in 188.0 cm  YOB: 1965    Weight:      219.0 lb 99.34 kg  Patient Age:    53 years    BSA:         2.26 m²  Patient Gender: M           BP:          112/84 mmHg       Date of Exam:        8/2/2018 7:11:38 AM  Referring Physician: [id number] ED UNASSIGNED  Sonographer:         Charanjit Iniguez  Reading Physician:   Nelson Kemp M.D.    Procedure:     2D Echo/Doppler/Color Doppler Complete.  Indications:   R07.9 - Chest Pain, unspecified  Diagnosis:     Other chest pain - R07.89  Study Details: Technically difficult study. Study quality was adversely   affected                 due to lung interference.         Summary:   1. Severely decreased global left ventricular systolic function.   2. Left atrial enlargement.   3. LV Ejection Fraction by Herrera's Method with a biplane EF of 26 %.   4. Mild mitral valve regurgitation.   5. Thickening of the anterior and posterior mitral valve leaflets.   6. Moderate-severe tricuspid regurgitation.   7. Mild to moderate aortic regurgitation.   8. Pulmonic valve regurgitation.   9. Estimated pulmonary artery systolic pressure is 72.9 mmHg assuming a   right atrial pressure of 15 mmHg, which is consistent with severe   pulmonary hypertension.  10. Peak transaortic gradient is 99.4 mmHg, mean transaortic gradient   equals 60.1 mmHg, the calculated aortic valve area equals 0.70 cm² by the   continuity equation consistent with severe aortic stenosis.    PHYSICIAN INTERPRETATION:  Left Ventricle: The left ventricular internal cavity size is normal. Left   ventricular wall thickness is normal. Global LV systolic function was   severely decreased. LV Ejection Fraction by Herrera's Method with a   biplane EF of 26 %.  Right Ventricle: Normal right ventricular size and function.  Left Atrium: Left atrial enlargement.  Right Atrium: Right atrial enlargement.  Pericardium: There is no evidence of pericardial effusion.  Mitral Valve: Mild mitral valve regurgitation is seen. Thickening of the   anterior and posterior mitral valve leaflets. No evidenceof mitral   stenosis.  Tricuspid Valve: Moderate-severe tricuspid regurgitation is visualized.   Estimated pulmonary artery systolic pressure is 72.9 mmHg assuming a   right atrial pressure of 15 mmHg, which is consistent with severe   pulmonary hypertension.  Aortic Valve: Mild to moderate aortic valve regurgitation is seen. Peak   Av velocity is 4.98 m/s, mean transaortic gradient equals 60.1 mmHg, the   calculated aortic valve area equals 0.70 cm² by the continuity equation   consistent with severe aortic stenosis.  Pulmonic Valve: Mild pulmonic valve regurgitation.  Aorta: The aortic root is normal in size and structure.  Venous: The inferior vena cava was dilated, with respiratory size   variation less than 50%, consistent with elevated right atrial pressure.       2D AND M-MODE MEASUREMENTS (normal ranges within parentheses):  Left Ventricle:                  Normal   Aorta/Left Atrium:               Normal  IVSd (2D):              1.30 cm (0.7-1.1) Left Atrium (Mmode): 4.68 cm   (1.9-4.0)  LVPWd (2D):             1.28 cm (0.7-1.1)  LVIDd (2D):             6.08 cm (3.4-5.7)  LVIDs (2D):             5.06 cm  LV FS (2D):             16.9 %   (>25%)  Relative Wall Thickness  0.42    (<0.42)    SPECTRAL DOPPLER ANALYSIS:  LV DIASTOLIC FUNCTION:  MV Peak E: 1.14 m/s Decel Time: 104 msec  MV Peak A: 0.38 m/s  E/A Ratio: 3.02    Aortic Valve:  AoV VMax:    4.98 m/s  AoV Area, Vmax:    0.71 cm² Vmax Indx:    0.32   cm²/m²  AoV VTI:     1.14 m    AoV Area, VTI:     0.70 cm² VTI Indx:    0.31   cm²/m²  AoV Pk Grad: 99.4 mmHg AoV Area, Mn Grad: 0.74 cm² Mn Grad Indx: 0.33   cm²/m²  AoV Mn Grad: 60.1 mmHg    LVOT Vmax: 0.77 m/s  LVOT VTI:  0.17 m  LVOT Diam: 2.43 cm    Mitral Valve:  MV P1/2 Time: 30.15 msec  MV Area, PHT: 7.30 cm²    Tricuspid Valve and PA/RV Systolic Pressure: TR Max Velocity: 3.80 m/s RA   Pressure: 15 mmHg RVSP/PASP: 72.9 mmHg       [id number] Nelson Kemp M.D., Electronically signed on 8/2/2018 at 9:46:22 AM     Cardiac catheterization: non-obstructive CAD    CHACHO:  FINDINGS  DIAGNOSIS/IMPRESSION:  dilated LA, intact IAS, dilated RA, clear JUJU  mildly thickened mitral valve, high EPSS due to severe cardiomyopathy and AI (limited and restricted leaflets separation), trace MR  calcified, structurally abnormal aortic valve with severe AS and severe AI with high gradiant across the aortic valve  moderate to severe TR attributed to pulmonary HTN due to AS/AI + systolic HF, tricuspid valve structure was normal  EF about 25%, normal aorta in ascending and descending      Gen: WN/WD NAD  Neuro: A&Ox3, nonfocal  Pulm: CTA B/L  CV: RRR, S1S2 +systolic murmur  Abd: Soft, NT, ND +BS  Ext: No edema, + peripheral pulses    Cardic Surgery Risk Factors  CVA and/or carotid/cerebrovascular disease. Yes  No  Explain if Yes  Aortoiliac disease Yes  No  Explain if Yes  Previous MI Yes  No  Explain if Yes  Previous Cardiac Surgery Yes  No  Explain if Yes  Hemodynamics-Unstable or Shock Yes  No  Explain if Yes  Diabetis Yes  No  Explain if Yes  Hepatic Failure Yes  No  Explain if Yes-- low EF   Renal failure and/or dialysis Yes  No  Explain if Yes  Heart failure-type-present or past Yes  No  Explain if Yes  COPD Yes  No  Explain if Yes-- mod to severe restrictive disease   Immune System Deficiency Yes  No  Explain if Yes  Malignant Ventricular Arrhythmia Yes  No  Explain if Yes    STS Score: Risk of Mortality: 2.346%    Morbidity or Mortality: 23.575%    Long Length of Stay: 10.659%    Short Length of Stay: 24.345%    Permanent Stroke: 0.497%    Prolonged Ventilation: 15.68%    DSW Infection: 0.431%    Renal Failure: 3.006%    Reoperation: 10.211%    Pt has AICD/PPM [ ] Yes  [x ] No             Brand Name:  Pre-op Beta Blocker ordered within 24 hrs of surgery ([facility] ONLY)?  [ ] Yes  [ ] No  If not, Why?  Type & Cross  [ ] Yes  [ ] No  NPO after Midnight [x ] Yes  [ ] No  Pre-op ABX ordered, to be taped on chart:  [ x] Yes  [ ] No     Hibiclens/Peridex ordered [x ] Yes  [ ] No  Intraop on Hold: PRBCs, CXR, CHACHO [x ]   Consent obtained  [ ] Yes  [ ] No

## 2018-08-06 NOTE — PROGRESS NOTE ADULT - SUBJECTIVE AND OBJECTIVE BOX
POST OPERATIVE PROCEDURAL DOCUMENTATION  PRE-OP DIAGNOSIS: multivalvular disease, cardiomyopathy    POST-OP DIAGNOSIS: severe AS-AI, severe cardiomyopathy, moderate severe TR, pulmonary HTN    PROCEDURE: Transesophageal echocardiogram    Primary Physician: Bry Liu MD  Fellow:    ANESTHESIA TYPE  [  ] General Anesthesia  [ x ] Conscious Sedation  [  ] Local/Regional    CONDITION  [  ] Critical  [  ] Serious  [ x ] Fair  [  ] Good    SPECIMENS REMOVED (IF APPLICABLE): NA    IMPLANTS (IF APPLICABLE): None    ESTIMATED BLOOD LOSS: None    COMPLICATIONS: None    FINDINGS  DIAGNOSIS/IMPRESSION:  dilated LA, intact IAS, dilated RA, clear JUJU  mildly thickened mitral valve, high EPSS due to severe cardiomyopathy and AI (limited and restricted leaflets separation), trace MR  calcified, structurally abnormal aortic valve with severe AS and severe AI with high gradiant across the aortic valve  moderate to severe TR attributed to pulmonary HTN due to AS/AI + systolic HF, tricuspid valve structure was normal  EF about 25%, normal aorta in ascending and descending    PLAN OF CARE:

## 2018-08-06 NOTE — PRE-ANESTHESIA EVALUATION ADULT - NSRADCARDRESULTSFT_GEN_ALL_CORE
Summary:   1. Severely decreased global left ventricular systolic function.   2. Left atrial enlargement.   3. LV Ejection Fraction by Herrera's Method with a biplane EF of 26 %.   4. Mild mitral valve regurgitation.   5. Thickening of the anterior and posterior mitral valve leaflets.   6. Moderate-severe tricuspid regurgitation.   7. Mild to moderate aortic regurgitation.   8. Pulmonic valve regurgitation.   9. Estimated pulmonary artery systolic pressure is 72.9 mmHg assuming a   right atrial pressure of 15 mmHg, which is consistent with severe   pulmonary hypertension.  10. Peak transaortic gradient is 99.4 mmHg, mean transaortic gradient   equals 60.1 mmHg, the calculated aortic valve area equals 0.70 cm² by the   continuity equation consistent with severe aortic stenosis.    PHYSICIAN INTERPRETATION:  Left Ventricle: The left ventricular internal cavity size is normal. Left   ventricular wall thickness is normal. Global LV systolic function was   severely decreased. LV Ejection Fraction by Herrera's Method with a   biplane EF of 26 %.  Right Ventricle: Normal right ventricular size and function.  Left Atrium: Left atrial enlargement.  Right Atrium: Right atrial enlargement.  Pericardium: There is no evidence of pericardial effusion.  Mitral Valve: Mild mitral valve regurgitation is seen. Thickening of the   anterior and posterior mitral valve leaflets. No evidence of mitral   stenosis.  Tricuspid Valve: Moderate-severe tricuspid regurgitation is visualized.   Estimated pulmonary artery systolic pressure is 72.9 mmHg assuming a   right atrial pressure of 15 mmHg, which is consistent with severe   pulmonary hypertension.  Aortic Valve: Mild to moderate aortic valve regurgitation is seen. Peak   Av velocity is 4.98 m/s, mean transaortic gradient equals 60.1 mmHg, the   calculated aortic valve area equals 0.70 cm² by the continuity equation   consistent with severe aortic stenosis.  Pulmonic Valve: Mild pulmonic valve regurgitation.  Aorta: The aortic root is normal in size and structure.  Venous: The inferior vena cava was dilated, with respiratory size   variation less than 50%, consistent with elevated right atrial pressure.       2D AND M-MODE MEASUREMENTS (normal ranges within parentheses):  Left Ventricle:                  Normal   Aorta/Left Atrium:               Normal  IVSd (2D):              1.30 cm (0.7-1.1) Left Atrium (Mmode): 4.68 cm   (1.9-4.0)  LVPWd (2D):             1.28 cm (0.7-1.1)  LVIDd (2D):             6.08 cm (3.4-5.7)  LVIDs (2D):             5.06 cm  LV FS (2D):             16.9 %   (>25%)  Relative Wall Thickness  0.42    (<0.42)    SPECTRAL DOPPLER ANALYSIS:  LV DIASTOLIC FUNCTION:  MV Peak E: 1.14 m/s Decel Time: 104 msec  MV Peak A: 0.38 m/s  E/A Ratio: 3.02    Aortic Valve:  AoV VMax:    4.98 m/s  AoV Area, Vmax:    0.71 cm² Vmax Indx:    0.32   cm²/m²  AoV VTI:     1.14 m    AoV Area, VTI:     0.70 cm² VTI Indx:     0.31   cm²/m²  AoV Pk Grad: 99.4 mmHg AoV Area, Mn Grad: 0.74 cm² Mn Grad Indx: 0.33   cm²/m²  AoV Mn Grad: 60.1 mmHg    LVOT Vmax: 0.77 m/s  LVOT VTI:  0.17 m  LVOT Diam: 2.43 cm    Mitral Valve:  MV P1/2 Time: 30.15 msec  MV Area, PHT: 7.30 cm²    Tricuspid Valve and PA/RV Systolic Pressure: TR Max Velocity: 3.80 m/s RA   Pressure: 15 mmHg RVSP/PASP: 72.9 mmHg       [id number] Nelson Kemp M.D., Electronically signed on 8/2/2018 at 9:46:22 AM              *** Final ***

## 2018-08-06 NOTE — PACU DISCHARGE NOTE - COMMENTS
CHACHO done at bedside in CTU. Patient hemodynamically stable and tolerated procedure well.    11:50am VS  BP: 110/65  HR: 82  RR: 15  Temp: 96.8F  SaO2: 100%

## 2018-08-07 LAB
HCT VFR BLD CALC: 40.1 % — LOW (ref 42–52)
HGB BLD-MCNC: 13.5 G/DL — LOW (ref 14–18)
MCHC RBC-ENTMCNC: 31.3 PG — HIGH (ref 27–31)
MCHC RBC-ENTMCNC: 33.7 G/DL — SIGNIFICANT CHANGE UP (ref 32–37)
MCV RBC AUTO: 93 FL — SIGNIFICANT CHANGE UP (ref 80–94)
NRBC # BLD: 0 /100 WBCS — SIGNIFICANT CHANGE UP (ref 0–0)
PLATELET # BLD AUTO: 244 K/UL — SIGNIFICANT CHANGE UP (ref 130–400)
RBC # BLD: 4.31 M/UL — LOW (ref 4.7–6.1)
RBC # FLD: 13.6 % — SIGNIFICANT CHANGE UP (ref 11.5–14.5)
WBC # BLD: 9.53 K/UL — SIGNIFICANT CHANGE UP (ref 4.8–10.8)
WBC # FLD AUTO: 9.53 K/UL — SIGNIFICANT CHANGE UP (ref 4.8–10.8)

## 2018-08-07 RX ORDER — TEMAZEPAM 15 MG/1
15 CAPSULE ORAL ONCE
Qty: 0 | Refills: 0 | Status: DISCONTINUED | OUTPATIENT
Start: 2018-08-07 | End: 2018-08-07

## 2018-08-07 RX ORDER — CHLORHEXIDINE GLUCONATE 213 G/1000ML
5 SOLUTION TOPICAL ONCE
Qty: 0 | Refills: 0 | Status: COMPLETED | OUTPATIENT
Start: 2018-08-07 | End: 2018-08-08

## 2018-08-07 RX ORDER — CHLORHEXIDINE GLUCONATE 213 G/1000ML
1 SOLUTION TOPICAL ONCE
Qty: 0 | Refills: 0 | Status: COMPLETED | OUTPATIENT
Start: 2018-08-07 | End: 2018-08-07

## 2018-08-07 RX ADMIN — AMIODARONE HYDROCHLORIDE 200 MILLIGRAM(S): 400 TABLET ORAL at 22:24

## 2018-08-07 RX ADMIN — AMIODARONE HYDROCHLORIDE 200 MILLIGRAM(S): 400 TABLET ORAL at 06:00

## 2018-08-07 RX ADMIN — CHLORHEXIDINE GLUCONATE 1 APPLICATION(S): 213 SOLUTION TOPICAL at 22:40

## 2018-08-07 RX ADMIN — ENOXAPARIN SODIUM 40 MILLIGRAM(S): 100 INJECTION SUBCUTANEOUS at 11:04

## 2018-08-07 RX ADMIN — ATORVASTATIN CALCIUM 80 MILLIGRAM(S): 80 TABLET, FILM COATED ORAL at 22:24

## 2018-08-07 RX ADMIN — FAMOTIDINE 20 MILLIGRAM(S): 10 INJECTION INTRAVENOUS at 17:04

## 2018-08-07 RX ADMIN — TEMAZEPAM 15 MILLIGRAM(S): 15 CAPSULE ORAL at 22:24

## 2018-08-07 RX ADMIN — AMIODARONE HYDROCHLORIDE 200 MILLIGRAM(S): 400 TABLET ORAL at 13:00

## 2018-08-07 RX ADMIN — Medication 81 MILLIGRAM(S): at 11:04

## 2018-08-07 RX ADMIN — FAMOTIDINE 20 MILLIGRAM(S): 10 INJECTION INTRAVENOUS at 06:00

## 2018-08-07 NOTE — PRE-ANESTHESIA EVALUATION ADULT - NSANTHOSAYNRD_GEN_A_CORE
No. REEMA screening performed.  STOP BANG Legend: 0-2 = LOW Risk; 3-4 = INTERMEDIATE Risk; 5-8 = HIGH Risk
No. REEMA screening performed.  STOP BANG Legend: 0-2 = LOW Risk; 3-4 = INTERMEDIATE Risk; 5-8 = HIGH Risk

## 2018-08-07 NOTE — PROGRESS NOTE ADULT - SUBJECTIVE AND OBJECTIVE BOX
Cardiac Surgery Pre-op Note:    Consult requesting by: Dr. Kemp  Cardiologist: Dr. Simmons  PMD: Dr. Erwin    cc- I have shortness of breath      Allergies    penicillins (Unknown)    Intolerances      HPI:  This is a 54 YO M with PMH AFib s/p cardioversion in the ED in May and ablation years ago, rheumatic fever as a child, and valve disease (patient unaware of the specifics, but has been told by a physician his valve was "calcified"), who presents with shortness of breath for the last few days to week with exertion, as well as intermittent mild chest discomfort also with exertion, which patient initially attributed to gas pain. He underwent bedside cardioversion in the ED May 11th and was DC'd home to follow up with his cardiologist, but was not given any medications since his CHADS-Vasc was 0. When he went to cardio office today he was sent to the ED due to his symptoms and ECG that showed V4-V6 TWI and elevation in AVR. In ED STEMI code was called but then cancelled when prior ECGs were seen to be similar and was determined to be LVH with S-T repolarization abnormalities. Of note, patient reports he has had a negative stress test in the past as well.   Currently denies any symptoms, resting comfortably in bed.      right bp 84/70 left arm 87/60    PAST MEDICAL & SURGICAL HISTORY:  Rheumatic fever in pediatric patient  Afib  H/O right knee surgery  H/O left knee surgery  H/O prior ablation treatment  History of cardioversion      MEDICATIONS  (STANDING):  aspirin enteric coated 81 milliGRAM(s) Oral daily  atorvastatin 80 milliGRAM(s) Oral at bedtime  enoxaparin Injectable 40 milliGRAM(s) SubCutaneous every 24 hours  famotidine    Tablet 20 milliGRAM(s) Oral two times a day    MEDICATIONS  (PRN):  acetaminophen   Tablet. 650 milliGRAM(s) Oral every 6 hours PRN Mild Pain (1 - 3)      Labs:                        13.7   9.19  )-----------( 250      ( 06 Aug 2018 02:15 )             40.8     08-06    140  |  101  |  19  ----------------------------<  100<H>  4.3   |  28  |  0.9    Ca    9.3      06 Aug 2018 02:15      LFT's WNL      PT/INR - ( 06 Aug 2018 02:15 )   PT: 12.50 sec;   INR: 1.16 ratio         PTT - ( 06 Aug 2018 02:15 )  PTT:31.8 sec    Blood Type:   HGB A1C: Hemoglobin A1C, Whole Blood: 5.2 % (08-03 @ 04:00)    Prealbumin:   Pro-BNP: Serum Pro-Brain Natriuretic Peptide: 8134 pg/mL (08-03 @ 04:00)    MRSA:  / MSSA: pending    CXR: EXAM:  XR CHEST PORTABLE IMMED 1V            PROCEDURE DATE:  08/01/2018      INTERPRETATION:  Clinical History / Reason for exam: Chest pain.    Comparison : Chest radiograph dated May 11, 2018.    Technique/Positioning: Satisfactory.    Findings:    Support devices: None.    Cardiac/mediastinum/hilum: Unremarkable.    Lung parenchyma/Pleura: Within normal limits.    Skeleton/soft tissues: No acute osseous abnormality.    Impression:      No radiographic evidence of acute cardiopulmonarydisease.      EXAM:  CT CHEST            PROCEDURE DATE:  08/02/2018      INTERPRETATION:  Reason for Exam:  Aortic stenosis. Preoperative for   aortic valve replacement.    CT of the chest was performed from the thoracic inlet to the level of the   adrenal glands without contrast injection. Coronal and sagittal images   have been submitted.    Comparison: Chest x-ray 8/1/2018.    Findings:     Tubes/Lines: None.    Lungs, Pleura, and Airways:No focal consolidation, mass, pleural effusion   or pneumothorax. Major airways are patent.    Incompletely formed left major fissure is noted.    Punctate calcified granuloma, left upper lobe (series 5/172) No   suspicious pulmonary nodules.     Mediastinum/Lymph Nodes: Multistation enlarged mediastinal lymphnodes   measuring up to 1.4 cm short axis diameter (right paratracheal   compartment series 5/99).    Heart/Great Vessels:     No concentric pericardial effusions. Extensive calcification of the   aortic valve is present.    Aortic dimensions:  Mid ascending aorta (3.7 cm, series 5/159)  Aortic arch (2.7 cm, series 5/90)  Mid descending aorta (2.9 cm, series 5/122)  Distal descending aorta (2.7 cm, series 5/254)     Dilated main pulmonary artery segment (3.6 cm, series 5/138), exclude   pulmonary hypertension.      Abdomen: Post cholecystectomy??.    Bones and soft tissues: No acute osseous abnormalities. Generative   changes of the spine.    IMPRESSION:    Extensive calcification, aortic valve.    Normal dimensions thoracic aorta (see measurements above).    Dilated pulmonary artery, approximately 3.6 cm. Exclude pulmonary   hypertension.    No suspicious pulmonary parenchymal lesions.    Nonspecific multistation 6 enlarged mediastinal lymph nodes measuring up   to 1.4 cm.      CHERYL WARD M.D., RESIDENT RADIOLOGIST  This document has been electronically signed.  MICHAELLE NICOLE M.D., ATTENDING RADIOLOGIST  This document has been electronically signed. Aug  3 2018 11:39AM    EKG: Ventricular Rate 81 BPM    Atrial Rate 81 BPM    P-R Interval 164 ms    QRS Duration 102 ms    Q-T Interval 410 ms    QTC Calculation(Bezet) 476 ms    P Axis 44 degrees    R Axis 34 degrees    T Axis 185 degrees    Diagnosis Line Normal sinus rhythm  Left ventricular hypertrophy with repolarization abnormality  Abnormal ECG    Confirmed by GITA VALENTIN MD ([id number]),  Dilip Lewis ([id number]) on  8/1/2018 4:20:13 PM    Carotid Duplex:  EXAM:  CAROTID DUPLEX BILATERAL            PROCEDURE DATE:  08/02/2018      INTERPRETATION:  Clinical History / Reason for exam: The patient is   53-year-old male with carotid bruit.  The study was performed to evaluate   the patient for carotid artery stenosis.    Duplex evaluation of the carotid arteries was performed bilaterally.  In the right carotid system, the internal carotid artery is noted to be   patent with a peak systolic velocity of 58.3 cm/sec over end diastolic   velocity of 23.4 cm/sec.    In the left carotid system, the internal carotid artery is noted to be   patent with a peak systolic velocity of 58.4 cm/sec over end diastolic   velocity of 25.2 cm/sec.    The right vertebral arterial flow was antegrade.  The left vertebral arterial flow was antegrade.    Impression:    20-39% stenosis of the right internal carotid artery.  20-39% stenosis of the left internal carotid artery.    ICD-10: R09.89    UA  Urinalysis (08.03.18 @ 05:40)    Glucose Qualitative, Urine: Negative mg/dL    Blood, Urine: Small    pH Urine: 6.0    Color: Yellow    Urine Appearance: Cloudy    Bilirubin: Negative    Ketone - Urine: Negative    Specific Gravity: 1.020    Protein, Urine: 30 mg/dL    Urobilinogen: 1.0 mg/dL    Nitrite: Negative    Leukocyte Esterase Concentration: Negative      PFT's: FEV1 2.29, 52% predicted      EXAM:  2-D ECHO (TTE) COMPLETE        PROCEDURE DATE:  08/02/2018      INTERPRETATION:  REPORT:    TRANSTHORACIC ECHOCARDIOGRAM REPORT         Patient Name:   JOCELYN SUNG Accession #: [id number]  Medical Rec #:  [id number]    Height:      74.0 in 188.0 cm  YOB: 1965    Weight:      219.0 lb 99.34 kg  Patient Age:    53 years    BSA:         2.26 m²  Patient Gender: M           BP:          112/84 mmHg       Date of Exam:        8/2/2018 7:11:38 AM  Referring Physician: [id number] ED UNASSIGNED  Sonographer:         Charanjit Iniguez  Reading Physician:   Nelson Kemp M.D.    Procedure:     2D Echo/Doppler/Color Doppler Complete.  Indications:   R07.9 - Chest Pain, unspecified  Diagnosis:     Other chest pain - R07.89  Study Details: Technically difficult study. Study quality was adversely   affected                 due to lung interference.         Summary:   1. Severely decreased global left ventricular systolic function.   2. Left atrial enlargement.   3. LV Ejection Fraction by Herrera's Method with a biplane EF of 26 %.   4. Mild mitral valve regurgitation.   5. Thickening of the anterior and posterior mitral valve leaflets.   6. Moderate-severe tricuspid regurgitation.   7. Mild to moderate aortic regurgitation.   8. Pulmonic valve regurgitation.   9. Estimated pulmonary artery systolic pressure is 72.9 mmHg assuming a   right atrial pressure of 15 mmHg, which is consistent with severe   pulmonary hypertension.  10. Peak transaortic gradient is 99.4 mmHg, mean transaortic gradient   equals 60.1 mmHg, the calculated aortic valve area equals 0.70 cm² by the   continuity equation consistent with severe aortic stenosis.    PHYSICIAN INTERPRETATION:  Left Ventricle: The left ventricular internal cavity size is normal. Left   ventricular wall thickness is normal. Global LV systolic function was   severely decreased. LV Ejection Fraction by Herrera's Method with a   biplane EF of 26 %.  Right Ventricle: Normal right ventricular size and function.  Left Atrium: Left atrial enlargement.  Right Atrium: Right atrial enlargement.  Pericardium: There is no evidence of pericardial effusion.  Mitral Valve: Mild mitral valve regurgitation is seen. Thickening of the   anterior and posterior mitral valve leaflets. No evidenceof mitral   stenosis.  Tricuspid Valve: Moderate-severe tricuspid regurgitation is visualized.   Estimated pulmonary artery systolic pressure is 72.9 mmHg assuming a   right atrial pressure of 15 mmHg, which is consistent with severe   pulmonary hypertension.  Aortic Valve: Mild to moderate aortic valve regurgitation is seen. Peak   Av velocity is 4.98 m/s, mean transaortic gradient equals 60.1 mmHg, the   calculated aortic valve area equals 0.70 cm² by the continuity equation   consistent with severe aortic stenosis.  Pulmonic Valve: Mild pulmonic valve regurgitation.  Aorta: The aortic root is normal in size and structure.  Venous: The inferior vena cava was dilated, with respiratory size   variation less than 50%, consistent with elevated right atrial pressure.       2D AND M-MODE MEASUREMENTS (normal ranges within parentheses):  Left Ventricle:                  Normal   Aorta/Left Atrium:               Normal  IVSd (2D):              1.30 cm (0.7-1.1) Left Atrium (Mmode): 4.68 cm   (1.9-4.0)  LVPWd (2D):             1.28 cm (0.7-1.1)  LVIDd (2D):             6.08 cm (3.4-5.7)  LVIDs (2D):             5.06 cm  LV FS (2D):             16.9 %   (>25%)  Relative Wall Thickness  0.42    (<0.42)    SPECTRAL DOPPLER ANALYSIS:  LV DIASTOLIC FUNCTION:  MV Peak E: 1.14 m/s Decel Time: 104 msec  MV Peak A: 0.38 m/s  E/A Ratio: 3.02    Aortic Valve:  AoV VMax:    4.98 m/s  AoV Area, Vmax:    0.71 cm² Vmax Indx:    0.32   cm²/m²  AoV VTI:     1.14 m    AoV Area, VTI:     0.70 cm² VTI Indx:    0.31   cm²/m²  AoV Pk Grad: 99.4 mmHg AoV Area, Mn Grad: 0.74 cm² Mn Grad Indx: 0.33   cm²/m²  AoV Mn Grad: 60.1 mmHg    LVOT Vmax: 0.77 m/s  LVOT VTI:  0.17 m  LVOT Diam: 2.43 cm    Mitral Valve:  MV P1/2 Time: 30.15 msec  MV Area, PHT: 7.30 cm²    Tricuspid Valve and PA/RV Systolic Pressure: TR Max Velocity: 3.80 m/s RA   Pressure: 15 mmHg RVSP/PASP: 72.9 mmHg       [id number] Nelson Kemp M.D., Electronically signed on 8/2/2018 at 9:46:22 AM     Cardiac catheterization: non-obstructive CAD    CHACHO:  FINDINGS  DIAGNOSIS/IMPRESSION:  dilated LA, intact IAS, dilated RA, clear JUJU  mildly thickened mitral valve, high EPSS due to severe cardiomyopathy and AI (limited and restricted leaflets separation), trace MR  calcified, structurally abnormal aortic valve with severe AS and severe AI with high gradiant across the aortic valve  moderate to severe TR attributed to pulmonary HTN due to AS/AI + systolic HF, tricuspid valve structure was normal  EF about 25%, normal aorta in ascending and descending      Gen: WN/WD NAD  Neuro: A&Ox3, nonfocal  Pulm: CTA B/L  CV: RRR, S1S2 +systolic murmur  Abd: Soft, NT, ND +BS  Ext: No edema, + peripheral pulses    Cardic Surgery Risk Factors  CVA and/or carotid/cerebrovascular disease. Yes  No  Explain if Yes  Aortoiliac disease Yes  No  Explain if Yes  Previous MI Yes  No  Explain if Yes  Previous Cardiac Surgery Yes  No  Explain if Yes  Hemodynamics-Unstable or Shock Yes  No  Explain if Yes  Diabetis Yes  No  Explain if Yes  Hepatic Failure Yes  No  Explain if Yes-- low EF   Renal failure and/or dialysis Yes  No  Explain if Yes  Heart failure-type-present or past Yes  No  Explain if Yes  COPD Yes  No  Explain if Yes-- mod to severe restrictive disease   Immune System Deficiency Yes  No  Explain if Yes  Malignant Ventricular Arrhythmia Yes  No  Explain if Yes    STS Score: Risk of Mortality: 2.346%    Morbidity or Mortality: 23.575%    Long Length of Stay: 10.659%    Short Length of Stay: 24.345%    Permanent Stroke: 0.497%    Prolonged Ventilation: 15.68%    DSW Infection: 0.431%    Renal Failure: 3.006%    Reoperation: 10.211%    Pt has AICD/PPM [ ] Yes  [x ] No             Brand Name:  Pre-op Beta Blocker ordered within 24 hrs of surgery ([facility] ONLY)?  [ ] Yes  [ ] No  If not, Why?  Type & Cross  [ ] Yes  [ ] No  NPO after Midnight [x ] Yes  [ ] No  Pre-op ABX ordered, to be taped on chart:  [ x] Yes  [ ] No     Hibiclens/Peridex ordered [x ] Yes  [ ] No  Intraop on Hold: PRBCs, CXR, CHACHO [x ]   Consent obtained  [ ] Yes  [ ] No                      ) Cardiac Surgery Pre-op Note:    Consult requesting by: Dr. Kemp  Cardiologist: Dr. Simmons  PMD: Dr. Erwin    cc- I have shortness of breath      Allergies    penicillins (Unknown)    Intolerances      HPI:  This is a 52 YO M with PMH AFib s/p cardioversion in the ED in May and ablation years ago, rheumatic fever as a child, and valve disease (patient unaware of the specifics, but has been told by a physician his valve was "calcified"), who presents with shortness of breath for the last few days to week with exertion, as well as intermittent mild chest discomfort also with exertion, which patient initially attributed to gas pain. He underwent bedside cardioversion in the ED May 11th and was DC'd home to follow up with his cardiologist, but was not given any medications since his CHADS-Vasc was 0. When he went to cardio office today he was sent to the ED due to his symptoms and ECG that showed V4-V6 TWI and elevation in AVR. In ED STEMI code was called but then cancelled when prior ECGs were seen to be similar and was determined to be LVH with S-T repolarization abnormalities. Of note, patient reports he has had a negative stress test in the past as well.   Pt had CHACHO yesterday which demonstrated severe AI and severe AS as well as mod-severe TR.  He currently denies any symptoms, resting comfortably in bed.      Vital Signs Last 24 Hrs  T(C): 36.1 (07 Aug 2018 07:30), Max: 36.1 (07 Aug 2018 07:30)  T(F): 96.9 (07 Aug 2018 07:30), Max: 96.9 (07 Aug 2018 07:30)  HR: 72 (07 Aug 2018 11:00) (72 - 82)  BP: 83/55 (07 Aug 2018 11:00) (76/44 - 100/69)  BP(mean): 63 (07 Aug 2018 11:00) (54 - 76)  RR: 18 (07 Aug 2018 11:00) (14 - 20)  SpO2: 99% (07 Aug 2018 11:00) (92% - 100%)  08-06-18 @ 07:01  -  08-07-18 @ 07:00  --------------------------------------------------------  IN: 250 mL / OUT: 350 mL / NET: -100 mL    08-07-18 @ 07:01  -  08-07-18 @ 11:34  --------------------------------------------------------  IN: 250 mL / OUT: 0 mL / NET: 250 mL    right bp 84/70 left arm 87/60    PAST MEDICAL & SURGICAL HISTORY:  Rheumatic fever in pediatric patient  Afib  H/O right knee surgery  H/O left knee surgery  H/O prior ablation treatment  History of cardioversion      MEDICATIONS  (STANDING):  aspirin enteric coated 81 milliGRAM(s) Oral daily  atorvastatin 80 milliGRAM(s) Oral at bedtime  enoxaparin Injectable 40 milliGRAM(s) SubCutaneous every 24 hours  famotidine    Tablet 20 milliGRAM(s) Oral two times a day    MEDICATIONS  (PRN):  acetaminophen   Tablet. 650 milliGRAM(s) Oral every 6 hours PRN Mild Pain (1 - 3)      Labs:                        13.7   9.19  )-----------( 250      ( 06 Aug 2018 02:15 )             40.8     08-06    140  |  101  |  19  ----------------------------<  100<H>  4.3   |  28  |  0.9    Ca    9.3      06 Aug 2018 02:15      LFT's WNL      PT/INR - ( 06 Aug 2018 02:15 )   PT: 12.50 sec;   INR: 1.16 ratio         PTT - ( 06 Aug 2018 02:15 )  PTT:31.8 sec    Blood Type:   HGB A1C: Hemoglobin A1C, Whole Blood: 5.2 % (08-03 @ 04:00)    Prealbumin:   Pro-BNP: Serum Pro-Brain Natriuretic Peptide: 8134 pg/mL (08-03 @ 04:00)    MRSA:  / MSSA: nasal cx neg    CXR: EXAM:  XR CHEST PORTABLE IMMED 1V            PROCEDURE DATE:  08/01/2018      INTERPRETATION:  Clinical History / Reason for exam: Chest pain.    Comparison : Chest radiograph dated May 11, 2018.    Technique/Positioning: Satisfactory.    Findings:    Support devices: None.    Cardiac/mediastinum/hilum: Unremarkable.    Lung parenchyma/Pleura: Within normal limits.    Skeleton/soft tissues: No acute osseous abnormality.    Impression:      No radiographic evidence of acute cardiopulmonarydisease.      EXAM:  CT CHEST            PROCEDURE DATE:  08/02/2018      INTERPRETATION:  Reason for Exam:  Aortic stenosis. Preoperative for   aortic valve replacement.    CT of the chest was performed from the thoracic inlet to the level of the   adrenal glands without contrast injection. Coronal and sagittal images   have been submitted.    Comparison: Chest x-ray 8/1/2018.    Findings:     Tubes/Lines: None.    Lungs, Pleura, and Airways:No focal consolidation, mass, pleural effusion   or pneumothorax. Major airways are patent.    Incompletely formed left major fissure is noted.    Punctate calcified granuloma, left upper lobe (series 5/172) No   suspicious pulmonary nodules.     Mediastinum/Lymph Nodes: Multistation enlarged mediastinal lymphnodes   measuring up to 1.4 cm short axis diameter (right paratracheal   compartment series 5/99).    Heart/Great Vessels:     No concentric pericardial effusions. Extensive calcification of the   aortic valve is present.    Aortic dimensions:  Mid ascending aorta (3.7 cm, series 5/159)  Aortic arch (2.7 cm, series 5/90)  Mid descending aorta (2.9 cm, series 5/122)  Distal descending aorta (2.7 cm, series 5/254)     Dilated main pulmonary artery segment (3.6 cm, series 5/138), exclude   pulmonary hypertension.      Abdomen: Post cholecystectomy??.    Bones and soft tissues: No acute osseous abnormalities. Generative   changes of the spine.    IMPRESSION:    Extensive calcification, aortic valve.    Normal dimensions thoracic aorta (see measurements above).    Dilated pulmonary artery, approximately 3.6 cm. Exclude pulmonary   hypertension.    No suspicious pulmonary parenchymal lesions.    Nonspecific multistation 6 enlarged mediastinal lymph nodes measuring up   to 1.4 cm.      CHERYL WARD M.D., RESIDENT RADIOLOGIST  This document has been electronically signed.  MICHAELLE NICOLE M.D., ATTENDING RADIOLOGIST  This document has been electronically signed. Aug  3 2018 11:39AM    EKG: Ventricular Rate 81 BPM    Atrial Rate 81 BPM    P-R Interval 164 ms    QRS Duration 102 ms    Q-T Interval 410 ms    QTC Calculation(Bezet) 476 ms    P Axis 44 degrees    R Axis 34 degrees    T Axis 185 degrees    Diagnosis Line Normal sinus rhythm  Left ventricular hypertrophy with repolarization abnormality  Abnormal ECG    Confirmed by GITA VALENTIN MD ([id number]),  Dilip Lewis ([id number]) on  8/1/2018 4:20:13 PM    Carotid Duplex:  EXAM:  CAROTID DUPLEX BILATERAL            PROCEDURE DATE:  08/02/2018      INTERPRETATION:  Clinical History / Reason for exam: The patient is   53-year-old male with carotid bruit.  The study was performed to evaluate   the patient for carotid artery stenosis.    Duplex evaluation of the carotid arteries was performed bilaterally.  In the right carotid system, the internal carotid artery is noted to be   patent with a peak systolic velocity of 58.3 cm/sec over end diastolic   velocity of 23.4 cm/sec.    In the left carotid system, the internal carotid artery is noted to be   patent with a peak systolic velocity of 58.4 cm/sec over end diastolic   velocity of 25.2 cm/sec.    The right vertebral arterial flow was antegrade.  The left vertebral arterial flow was antegrade.    Impression:    20-39% stenosis of the right internal carotid artery.  20-39% stenosis of the left internal carotid artery.    ICD-10: R09.89    UA  Urinalysis (08.03.18 @ 05:40)    Glucose Qualitative, Urine: Negative mg/dL    Blood, Urine: Small    pH Urine: 6.0    Color: Yellow    Urine Appearance: Cloudy    Bilirubin: Negative    Ketone - Urine: Negative    Specific Gravity: 1.020    Protein, Urine: 30 mg/dL    Urobilinogen: 1.0 mg/dL    Nitrite: Negative    Leukocyte Esterase Concentration: Negative      PFT's: FEV1 2.29, 52% predicted      EXAM:  2-D ECHO (TTE) COMPLETE        PROCEDURE DATE:  08/02/2018      INTERPRETATION:  REPORT:    TRANSTHORACIC ECHOCARDIOGRAM REPORT         Patient Name:   JOCELYN SUNG Accession #: [id number]  Medical Rec #:  [id number]    Height:      74.0 in 188.0 cm  YOB: 1965    Weight:      219.0 lb 99.34 kg  Patient Age:    53 years    BSA:         2.26 m²  Patient Gender: M           BP:          112/84 mmHg       Date of Exam:        8/2/2018 7:11:38 AM  Referring Physician: [id number] ED UNASSIGNED  Sonographer:         Charanjit Iniguez  Reading Physician:   Nelson Kemp M.D.    Procedure:     2D Echo/Doppler/Color Doppler Complete.  Indications:   R07.9 - Chest Pain, unspecified  Diagnosis:     Other chest pain - R07.89  Study Details: Technically difficult study. Study quality was adversely   affected                 due to lung interference.         Summary:   1. Severely decreased global left ventricular systolic function.   2. Left atrial enlargement.   3. LV Ejection Fraction by Herrera's Method with a biplane EF of 26 %.   4. Mild mitral valve regurgitation.   5. Thickening of the anterior and posterior mitral valve leaflets.   6. Moderate-severe tricuspid regurgitation.   7. Mild to moderate aortic regurgitation.   8. Pulmonic valve regurgitation.   9. Estimated pulmonary artery systolic pressure is 72.9 mmHg assuming a   right atrial pressure of 15 mmHg, which is consistent with severe   pulmonary hypertension.  10. Peak transaortic gradient is 99.4 mmHg, mean transaortic gradient   equals 60.1 mmHg, the calculated aortic valve area equals 0.70 cm² by the   continuity equation consistent with severe aortic stenosis.    PHYSICIAN INTERPRETATION:  Left Ventricle: The left ventricular internal cavity size is normal. Left   ventricular wall thickness is normal. Global LV systolic function was   severely decreased. LV Ejection Fraction by Herrera's Method with a   biplane EF of 26 %.  Right Ventricle: Normal right ventricular size and function.  Left Atrium: Left atrial enlargement.  Right Atrium: Right atrial enlargement.  Pericardium: There is no evidence of pericardial effusion.  Mitral Valve: Mild mitral valve regurgitation is seen. Thickening of the   anterior and posterior mitral valve leaflets. No evidenceof mitral   stenosis.  Tricuspid Valve: Moderate-severe tricuspid regurgitation is visualized.   Estimated pulmonary artery systolic pressure is 72.9 mmHg assuming a   right atrial pressure of 15 mmHg, which is consistent with severe   pulmonary hypertension.  Aortic Valve: Mild to moderate aortic valve regurgitation is seen. Peak   Av velocity is 4.98 m/s, mean transaortic gradient equals 60.1 mmHg, the   calculated aortic valve area equals 0.70 cm² by the continuity equation   consistent with severe aortic stenosis.  Pulmonic Valve: Mild pulmonic valve regurgitation.  Aorta: The aortic root is normal in size and structure.  Venous: The inferior vena cava was dilated, with respiratory size   variation less than 50%, consistent with elevated right atrial pressure.       2D AND M-MODE MEASUREMENTS (normal ranges within parentheses):  Left Ventricle:                  Normal   Aorta/Left Atrium:               Normal  IVSd (2D):              1.30 cm (0.7-1.1) Left Atrium (Mmode): 4.68 cm   (1.9-4.0)  LVPWd (2D):             1.28 cm (0.7-1.1)  LVIDd (2D):             6.08 cm (3.4-5.7)  LVIDs (2D):             5.06 cm  LV FS (2D):             16.9 %   (>25%)  Relative Wall Thickness  0.42    (<0.42)    SPECTRAL DOPPLER ANALYSIS:  LV DIASTOLIC FUNCTION:  MV Peak E: 1.14 m/s Decel Time: 104 msec  MV Peak A: 0.38 m/s  E/A Ratio: 3.02    Aortic Valve:  AoV VMax:    4.98 m/s  AoV Area, Vmax:    0.71 cm² Vmax Indx:    0.32   cm²/m²  AoV VTI:     1.14 m    AoV Area, VTI:     0.70 cm² VTI Indx:    0.31   cm²/m²  AoV Pk Grad: 99.4 mmHg AoV Area, Mn Grad: 0.74 cm² Mn Grad Indx: 0.33   cm²/m²  AoV Mn Grad: 60.1 mmHg    LVOT Vmax: 0.77 m/s  LVOT VTI:  0.17 m  LVOT Diam: 2.43 cm    Mitral Valve:  MV P1/2 Time: 30.15 msec  MV Area, PHT: 7.30 cm²    Tricuspid Valve and PA/RV Systolic Pressure: TR Max Velocity: 3.80 m/s RA   Pressure: 15 mmHg RVSP/PASP: 72.9 mmHg       [id number] Nelson Kemp M.D., Electronically signed on 8/2/2018 at 9:46:22 AM     Cardiac catheterization: non-obstructive CAD    CHACHO:  FINDINGS  DIAGNOSIS/IMPRESSION:  dilated LA, intact IAS, dilated RA, clear JUJU  mildly thickened mitral valve, high EPSS due to severe cardiomyopathy and AI (limited and restricted leaflets separation), trace MR  calcified, structurally abnormal aortic valve with severe AS and severe AI with high gradiant across the aortic valve  moderate to severe TR attributed to pulmonary HTN due to AS/AI + systolic HF, tricuspid valve structure was normal  EF about 25%, normal aorta in ascending and descending      Gen: WN/WD NAD  Neuro: A&Ox3, nonfocal  Pulm: CTA B/L  CV: RRR, S1S2 +systolic murmur  Abd: Soft, NT, ND +BS  Ext: No edema, + peripheral pulses    Cardic Surgery Risk Factors  CVA and/or carotid/cerebrovascular disease. Yes  No  Explain if Yes  Aortoiliac disease Yes  No  Explain if Yes  Previous MI Yes  No  Explain if Yes  Previous Cardiac Surgery Yes  No  Explain if Yes  Hemodynamics-Unstable or Shock Yes  No  Explain if Yes  Diabetes Yes  No  Explain if Yes  Hepatic Failure Yes  No  Explain if Yes-- low EF   Renal failure and/or dialysis Yes  No  Explain if Yes  Heart failure-type-present or past Yes  No  Explain if Yes  COPD Yes  No  Explain if Yes-- mod to severe restrictive disease   Immune System Deficiency Yes  No  Explain if Yes  Malignant Ventricular Arrhythmia Yes  No  Explain if Yes    STS Score: Risk of Mortality: 2.346%    Morbidity or Mortality: 23.575%    Long Length of Stay: 10.659%    Short Length of Stay: 24.345%    Permanent Stroke: 0.497%    Prolonged Ventilation: 15.68%    DSW Infection: 0.431%    Renal Failure: 3.006%    Reoperation: 10.211%    Pt has AICD/PPM [ ] Yes  [x ] No             Brand Name:  Pre-op Beta Blocker ordered within 24 hrs of surgery (CABG ONLY)?  [ ] Yes  [x ] No  If not, Why?-  contraindicated due to hypotension  Type & Cross  [x ] Yes  [ ] No  NPO after Midnight [x ] Yes  [ ] No  Pre-op ABX ordered, to be taped on chart:  [ x] Yes  [ ] No     Hibiclens/Peridex ordered [x ] Yes  [ ] No  Intraop on Hold: PRBCs, CXR, CHACHO [x ]   Consent obtained  [x ] Yes  [ ] No                      )

## 2018-08-07 NOTE — PRE-ANESTHESIA EVALUATION ADULT - NSANTHPEFT_GEN_ALL_CORE
Grade 3/6 systolic murmur loudest at aortic area  Lungs CTA b/l
AOx3  CTAB  Systolic ejection murmur

## 2018-08-07 NOTE — CHART NOTE - NSCHARTNOTEFT_GEN_A_CORE
patient is doing well and SOB is better. Armani revealed poor EF and at least moderate MR.  Had a long discussion w patient and family. gave him options of mechanical v bioprosthesis. he wants bio. also pssibility of TVR and maze explained. he agrees to v high risk surgery.

## 2018-08-08 LAB
ALBUMIN SERPL ELPH-MCNC: 3.2 G/DL — LOW (ref 3.5–5.2)
ALP SERPL-CCNC: 53 U/L — SIGNIFICANT CHANGE UP (ref 30–115)
ALT FLD-CCNC: 62 U/L — HIGH (ref 0–41)
ANION GAP SERPL CALC-SCNC: 14 MMOL/L — SIGNIFICANT CHANGE UP (ref 7–14)
APTT BLD: 28 SEC — SIGNIFICANT CHANGE UP (ref 27–39.2)
AST SERPL-CCNC: 87 U/L — HIGH (ref 0–41)
BASOPHILS # BLD AUTO: 0.03 K/UL — SIGNIFICANT CHANGE UP (ref 0–0.2)
BASOPHILS NFR BLD AUTO: 0.2 % — SIGNIFICANT CHANGE UP (ref 0–1)
BILIRUB SERPL-MCNC: 1.1 MG/DL — SIGNIFICANT CHANGE UP (ref 0.2–1.2)
BLD GP AB SCN SERPL QL: SIGNIFICANT CHANGE UP
BUN SERPL-MCNC: 13 MG/DL — SIGNIFICANT CHANGE UP (ref 10–20)
CALCIUM SERPL-MCNC: 7.4 MG/DL — LOW (ref 8.5–10.1)
CHLORIDE SERPL-SCNC: 110 MMOL/L — SIGNIFICANT CHANGE UP (ref 98–110)
CO2 SERPL-SCNC: 22 MMOL/L — SIGNIFICANT CHANGE UP (ref 17–32)
CREAT SERPL-MCNC: 0.8 MG/DL — SIGNIFICANT CHANGE UP (ref 0.7–1.5)
EOSINOPHIL # BLD AUTO: 0.01 K/UL — SIGNIFICANT CHANGE UP (ref 0–0.7)
EOSINOPHIL NFR BLD AUTO: 0.1 % — SIGNIFICANT CHANGE UP (ref 0–8)
GAS PNL BLDA: SIGNIFICANT CHANGE UP
GAS PNL BLDA: SIGNIFICANT CHANGE UP
GLUCOSE SERPL-MCNC: 129 MG/DL — HIGH (ref 70–99)
HCT VFR BLD CALC: 36.1 % — LOW (ref 42–52)
HGB BLD-MCNC: 12.1 G/DL — LOW (ref 14–18)
IMM GRANULOCYTES NFR BLD AUTO: 1.1 % — HIGH (ref 0.1–0.3)
INR BLD: 1.31 RATIO — HIGH (ref 0.65–1.3)
LYMPHOCYTES # BLD AUTO: 1.06 K/UL — LOW (ref 1.2–3.4)
LYMPHOCYTES # BLD AUTO: 5.7 % — LOW (ref 20.5–51.1)
MCHC RBC-ENTMCNC: 31.7 PG — HIGH (ref 27–31)
MCHC RBC-ENTMCNC: 33.5 G/DL — SIGNIFICANT CHANGE UP (ref 32–37)
MCV RBC AUTO: 94.5 FL — HIGH (ref 80–94)
MONOCYTES # BLD AUTO: 1.55 K/UL — HIGH (ref 0.1–0.6)
MONOCYTES NFR BLD AUTO: 8.3 % — SIGNIFICANT CHANGE UP (ref 1.7–9.3)
NEUTROPHILS # BLD AUTO: 15.9 K/UL — HIGH (ref 1.4–6.5)
NEUTROPHILS NFR BLD AUTO: 84.6 % — HIGH (ref 42.2–75.2)
NRBC # BLD: 0 /100 WBCS — SIGNIFICANT CHANGE UP (ref 0–0)
PLATELET # BLD AUTO: 146 K/UL — SIGNIFICANT CHANGE UP (ref 130–400)
POTASSIUM SERPL-MCNC: 4.5 MMOL/L — SIGNIFICANT CHANGE UP (ref 3.5–5)
POTASSIUM SERPL-SCNC: 4.5 MMOL/L — SIGNIFICANT CHANGE UP (ref 3.5–5)
PROT SERPL-MCNC: 4.7 G/DL — LOW (ref 6–8)
PROTHROM AB SERPL-ACNC: 14.1 SEC — HIGH (ref 9.95–12.87)
RBC # BLD: 3.82 M/UL — LOW (ref 4.7–6.1)
RBC # FLD: 13.5 % — SIGNIFICANT CHANGE UP (ref 11.5–14.5)
SODIUM SERPL-SCNC: 146 MMOL/L — SIGNIFICANT CHANGE UP (ref 135–146)
TYPE + AB SCN PNL BLD: SIGNIFICANT CHANGE UP
WBC # BLD: 18.76 K/UL — HIGH (ref 4.8–10.8)
WBC # FLD AUTO: 18.76 K/UL — HIGH (ref 4.8–10.8)

## 2018-08-08 RX ORDER — NOREPINEPHRINE BITARTRATE/D5W 8 MG/250ML
0.05 PLASTIC BAG, INJECTION (ML) INTRAVENOUS
Qty: 8 | Refills: 0 | Status: DISCONTINUED | OUTPATIENT
Start: 2018-08-08 | End: 2018-08-09

## 2018-08-08 RX ORDER — MEPERIDINE HYDROCHLORIDE 50 MG/ML
25 INJECTION INTRAMUSCULAR; INTRAVENOUS; SUBCUTANEOUS ONCE
Qty: 0 | Refills: 0 | Status: DISCONTINUED | OUTPATIENT
Start: 2018-08-08 | End: 2018-08-09

## 2018-08-08 RX ORDER — OXYCODONE HYDROCHLORIDE 5 MG/1
10 TABLET ORAL EVERY 6 HOURS
Qty: 0 | Refills: 0 | Status: DISCONTINUED | OUTPATIENT
Start: 2018-08-08 | End: 2018-08-13

## 2018-08-08 RX ORDER — ASPIRIN/CALCIUM CARB/MAGNESIUM 324 MG
325 TABLET ORAL DAILY
Qty: 0 | Refills: 0 | Status: DISCONTINUED | OUTPATIENT
Start: 2018-08-08 | End: 2018-08-08

## 2018-08-08 RX ORDER — NITROGLYCERIN 6.5 MG
15 CAPSULE, EXTENDED RELEASE ORAL
Qty: 50 | Refills: 0 | Status: DISCONTINUED | OUTPATIENT
Start: 2018-08-08 | End: 2018-08-08

## 2018-08-08 RX ORDER — IPRATROPIUM BROMIDE 0.2 MG/ML
2 SOLUTION, NON-ORAL INHALATION EVERY 6 HOURS
Qty: 0 | Refills: 0 | Status: DISCONTINUED | OUTPATIENT
Start: 2018-08-08 | End: 2018-08-09

## 2018-08-08 RX ORDER — SODIUM CHLORIDE 9 MG/ML
1000 INJECTION INTRAMUSCULAR; INTRAVENOUS; SUBCUTANEOUS
Qty: 0 | Refills: 0 | Status: DISCONTINUED | OUTPATIENT
Start: 2018-08-08 | End: 2018-08-11

## 2018-08-08 RX ORDER — VANCOMYCIN HCL 1 G
1000 VIAL (EA) INTRAVENOUS ONCE
Qty: 0 | Refills: 0 | Status: DISCONTINUED | OUTPATIENT
Start: 2018-08-08 | End: 2018-08-08

## 2018-08-08 RX ORDER — ALBUMIN HUMAN 25 %
3000 VIAL (ML) INTRAVENOUS ONCE
Qty: 0 | Refills: 0 | Status: COMPLETED | OUTPATIENT
Start: 2018-08-08 | End: 2018-08-08

## 2018-08-08 RX ORDER — PROPOFOL 10 MG/ML
25 INJECTION, EMULSION INTRAVENOUS
Qty: 1000 | Refills: 0 | Status: DISCONTINUED | OUTPATIENT
Start: 2018-08-08 | End: 2018-08-09

## 2018-08-08 RX ORDER — NITROGLYCERIN 6.5 MG
5 CAPSULE, EXTENDED RELEASE ORAL
Qty: 50 | Refills: 0 | Status: DISCONTINUED | OUTPATIENT
Start: 2018-08-08 | End: 2018-08-10

## 2018-08-08 RX ORDER — DEXTROSE 50 % IN WATER 50 %
25 SYRINGE (ML) INTRAVENOUS
Qty: 0 | Refills: 0 | Status: DISCONTINUED | OUTPATIENT
Start: 2018-08-08 | End: 2018-08-11

## 2018-08-08 RX ORDER — ASPIRIN/CALCIUM CARB/MAGNESIUM 324 MG
300 TABLET ORAL ONCE
Qty: 0 | Refills: 0 | Status: DISCONTINUED | OUTPATIENT
Start: 2018-08-08 | End: 2018-08-09

## 2018-08-08 RX ORDER — NICARDIPINE HYDROCHLORIDE 30 MG/1
5 CAPSULE, EXTENDED RELEASE ORAL
Qty: 40 | Refills: 0 | Status: DISCONTINUED | OUTPATIENT
Start: 2018-08-08 | End: 2018-08-09

## 2018-08-08 RX ORDER — CEFAZOLIN SODIUM 1 G
1000 VIAL (EA) INJECTION EVERY 8 HOURS
Qty: 0 | Refills: 0 | Status: COMPLETED | OUTPATIENT
Start: 2018-08-08 | End: 2018-08-09

## 2018-08-08 RX ORDER — KETOROLAC TROMETHAMINE 30 MG/ML
30 SYRINGE (ML) INJECTION EVERY 8 HOURS
Qty: 0 | Refills: 0 | Status: DISCONTINUED | OUTPATIENT
Start: 2018-08-08 | End: 2018-08-10

## 2018-08-08 RX ORDER — ASPIRIN/CALCIUM CARB/MAGNESIUM 324 MG
325 TABLET ORAL DAILY
Qty: 0 | Refills: 0 | Status: DISCONTINUED | OUTPATIENT
Start: 2018-08-09 | End: 2018-08-13

## 2018-08-08 RX ORDER — DOCUSATE SODIUM 100 MG
100 CAPSULE ORAL THREE TIMES A DAY
Qty: 0 | Refills: 0 | Status: DISCONTINUED | OUTPATIENT
Start: 2018-08-08 | End: 2018-08-13

## 2018-08-08 RX ORDER — DEXMEDETOMIDINE HYDROCHLORIDE IN 0.9% SODIUM CHLORIDE 4 UG/ML
0.5 INJECTION INTRAVENOUS
Qty: 200 | Refills: 0 | Status: DISCONTINUED | OUTPATIENT
Start: 2018-08-08 | End: 2018-08-09

## 2018-08-08 RX ORDER — DEXTROSE 50 % IN WATER 50 %
50 SYRINGE (ML) INTRAVENOUS
Qty: 0 | Refills: 0 | Status: DISCONTINUED | OUTPATIENT
Start: 2018-08-08 | End: 2018-08-11

## 2018-08-08 RX ORDER — CHLORHEXIDINE GLUCONATE 213 G/1000ML
5 SOLUTION TOPICAL EVERY 4 HOURS
Qty: 0 | Refills: 0 | Status: DISCONTINUED | OUTPATIENT
Start: 2018-08-08 | End: 2018-08-09

## 2018-08-08 RX ORDER — INSULIN HUMAN 100 [IU]/ML
1 INJECTION, SOLUTION SUBCUTANEOUS
Qty: 100 | Refills: 0 | Status: DISCONTINUED | OUTPATIENT
Start: 2018-08-08 | End: 2018-08-10

## 2018-08-08 RX ORDER — FAMOTIDINE 10 MG/ML
20 INJECTION INTRAVENOUS EVERY 12 HOURS
Qty: 0 | Refills: 0 | Status: DISCONTINUED | OUTPATIENT
Start: 2018-08-08 | End: 2018-08-09

## 2018-08-08 RX ORDER — VASOPRESSIN 20 [USP'U]/ML
0.03 INJECTION INTRAVENOUS
Qty: 100 | Refills: 0 | Status: DISCONTINUED | OUTPATIENT
Start: 2018-08-08 | End: 2018-08-09

## 2018-08-08 RX ORDER — MAGNESIUM SULFATE 500 MG/ML
1 VIAL (ML) INJECTION EVERY 12 HOURS
Qty: 0 | Refills: 0 | Status: DISCONTINUED | OUTPATIENT
Start: 2018-08-08 | End: 2018-08-13

## 2018-08-08 RX ORDER — ALBUTEROL 90 UG/1
2 AEROSOL, METERED ORAL EVERY 6 HOURS
Qty: 0 | Refills: 0 | Status: DISCONTINUED | OUTPATIENT
Start: 2018-08-08 | End: 2018-08-09

## 2018-08-08 RX ORDER — PHENYLEPHRINE HYDROCHLORIDE 10 MG/ML
0.1 INJECTION INTRAVENOUS
Qty: 40 | Refills: 0 | Status: DISCONTINUED | OUTPATIENT
Start: 2018-08-08 | End: 2018-08-09

## 2018-08-08 RX ORDER — OXYCODONE HYDROCHLORIDE 5 MG/1
5 TABLET ORAL EVERY 6 HOURS
Qty: 0 | Refills: 0 | Status: DISCONTINUED | OUTPATIENT
Start: 2018-08-08 | End: 2018-08-13

## 2018-08-08 RX ORDER — SENNA PLUS 8.6 MG/1
1 TABLET ORAL EVERY 12 HOURS
Qty: 0 | Refills: 0 | Status: DISCONTINUED | OUTPATIENT
Start: 2018-08-08 | End: 2018-08-13

## 2018-08-08 RX ADMIN — VASOPRESSIN 1.8 UNIT(S)/MIN: 20 INJECTION INTRAVENOUS at 14:24

## 2018-08-08 RX ADMIN — Medication 100 MILLIGRAM(S): at 21:39

## 2018-08-08 RX ADMIN — Medication 1.5 MICROGRAM(S)/MIN: at 15:39

## 2018-08-08 RX ADMIN — NICARDIPINE HYDROCHLORIDE 25 MG/HR: 30 CAPSULE, EXTENDED RELEASE ORAL at 14:24

## 2018-08-08 RX ADMIN — ALBUTEROL 2 PUFF(S): 90 AEROSOL, METERED ORAL at 21:18

## 2018-08-08 RX ADMIN — Medication 1500 MILLILITER(S): at 18:00

## 2018-08-08 RX ADMIN — CHLORHEXIDINE GLUCONATE 5 MILLILITER(S): 213 SOLUTION TOPICAL at 21:41

## 2018-08-08 RX ADMIN — INSULIN HUMAN 1 UNIT(S)/HR: 100 INJECTION, SOLUTION SUBCUTANEOUS at 14:31

## 2018-08-08 RX ADMIN — Medication 30 MILLIGRAM(S): at 18:37

## 2018-08-08 RX ADMIN — CHLORHEXIDINE GLUCONATE 5 MILLILITER(S): 213 SOLUTION TOPICAL at 05:26

## 2018-08-08 RX ADMIN — Medication 100 MILLIGRAM(S): at 21:41

## 2018-08-08 RX ADMIN — Medication 9.3 MICROGRAM(S)/KG/MIN: at 14:24

## 2018-08-08 RX ADMIN — CHLORHEXIDINE GLUCONATE 5 MILLILITER(S): 213 SOLUTION TOPICAL at 18:58

## 2018-08-08 RX ADMIN — SODIUM CHLORIDE 20 MILLILITER(S): 9 INJECTION INTRAMUSCULAR; INTRAVENOUS; SUBCUTANEOUS at 14:36

## 2018-08-08 RX ADMIN — Medication 100 GRAM(S): at 18:58

## 2018-08-08 RX ADMIN — FAMOTIDINE 20 MILLIGRAM(S): 10 INJECTION INTRAVENOUS at 19:00

## 2018-08-08 RX ADMIN — Medication 100 MILLIGRAM(S): at 15:49

## 2018-08-08 RX ADMIN — DEXMEDETOMIDINE HYDROCHLORIDE IN 0.9% SODIUM CHLORIDE 12.4 MICROGRAM(S)/KG/HR: 4 INJECTION INTRAVENOUS at 14:24

## 2018-08-08 RX ADMIN — Medication 30 MILLIGRAM(S): at 18:57

## 2018-08-08 NOTE — DISCHARGE NOTE ADULT - CARE PROVIDER_API CALL
Keny Boggs), Thoracic and Cardiac Surgery  [address] [place] [address]  Phone: [phone]  Fax: [phone]

## 2018-08-08 NOTE — DISCHARGE NOTE ADULT - PLAN OF CARE
continue current medications a prescribed Measure weight and temperature daily.  Call Surgeon ([phone]) if any weight gain, temperature greater than 101, increased leg swelling, or drainage from incisions.  Do not lift, push or pull anything greater than 10 lbs for 8 weeks.  Go to Emergency room if any chest pain or severe shortness of breath.  Call your primary care physician for an appointment within 2-4 weeks and call your cardiologist for an appointment within 2 weeks.  All prescription refills will be through your PMD.

## 2018-08-08 NOTE — DISCHARGE NOTE ADULT - PATIENT PORTAL LINK FT
You can access the [facility] Patient Portal, offered by [facility], by registering with the following website: [email or web address]

## 2018-08-08 NOTE — DIETITIAN INITIAL EVALUATION ADULT. - ENERGY NEEDS
[id number] kcal/day (MSJ x 1.1-1.2) For BMI 28 and s/p procedure. Pt likely be extubated after 6 hours. Therefore, will be using MSJ.  99-109g/day (1.0-1.1 g/kg of ABW)  per CTU team.

## 2018-08-08 NOTE — DISCHARGE NOTE ADULT - MEDICATION SUMMARY - MEDICATIONS TO TAKE
I will START or STAY ON the medications listed below when I get home from the hospital:    acetaminophen 325 mg oral tablet  -- 2 tab(s) by mouth every 6 hours, As needed, For Temp greater than 38 C (100.4 F)  -- Indication: For Mild pain or fever greater than 100.4    aspirin 325 mg oral delayed release tablet  -- 1 tab(s) by mouth once a day  -- Indication: For Aortic valve replacement    Percocet 5/325 oral tablet  -- 2 tab(s) by mouth every 6 hours, As Needed -for moderate pain MDD:8  -- Caution federal law prohibits the transfer of this drug to any person other  than the person for whom it was prescribed.  May cause drowsiness.  Alcohol may intensify this effect.  Use care when operating dangerous machinery.  This prescription cannot be refilled.  This product contains acetaminophen.  Do not use  with any other product containing acetaminophen to prevent possible liver damage.  Using more of this medication than prescribed may cause serious breathing problems.    -- Indication: For Moderate to Severe pain take 1-2 tabs every 6 hours    amiodarone 200 mg oral tablet  -- 1 tab(s) by mouth once a day   -- Indication: For Atrial fibrillation    famotidine 20 mg oral tablet  -- 1 tab(s) by mouth 2 times a day  -- Indication: For Heart burn    docusate sodium 100 mg oral capsule  -- 1 cap(s) by mouth 3 times a day  -- Indication: For stool softener

## 2018-08-08 NOTE — DIETITIAN INITIAL EVALUATION ADULT. - PT NOT SOURCE
other (specify)/sedated/LOS- Pt s/p TAVR 8/8. Spoken with LIP, will be intubated to ventilator under sedation for usually 4-6 hours.  Currently with no edema. Ecchymosis. LBM unknown./intubated

## 2018-08-08 NOTE — BRIEF OPERATIVE NOTE - PROCEDURE
<<-----Click on this checkbox to enter Procedure Ligation of left atrial appendage  08/08/2018    Active  [facility]  Maze procedure  08/08/2018    Active  [facility]  Aortic valve replacement  08/08/2018    Active  LINWOOD

## 2018-08-08 NOTE — DIETITIAN INITIAL EVALUATION ADULT. - ORAL INTAKE PTA
pt PTA eats very well except he does not eat vegetables. Doesnot take vitamin/supplements. NKFA./good

## 2018-08-08 NOTE — DISCHARGE NOTE ADULT - CARE PLAN
Principal Discharge DX:	Afib  Goal:	continue current medications a prescribed  Assessment and plan of treatment:	Measure weight and temperature daily.  Call Surgeon ([phone]) if any weight gain, temperature greater than 101, increased leg swelling, or drainage from incisions.  Do not lift, push or pull anything greater than 10 lbs for 8 weeks.  Go to Emergency room if any chest pain or severe shortness of breath.  Call your primary care physician for an appointment within 2-4 weeks and call your cardiologist for an appointment within 2 weeks.  All prescription refills will be through your PMD.

## 2018-08-08 NOTE — DIETITIAN INITIAL EVALUATION ADULT. - DIET TYPE
No diet ordered, likely NPO for OR. However, on EMR, pt has been documented as having 100% PO since day 1. Vs. Wife reports of pt eating a range of % while rarely he would miss a meal...

## 2018-08-08 NOTE — DISCHARGE NOTE ADULT - NS AS ACTIVITY OBS
Ambulate several times daily as tolerated.  Shower daily and keep incisions clean and dry.  No driving for 8 weeks.

## 2018-08-08 NOTE — DIETITIAN INITIAL EVALUATION ADULT. - OTHER INFO
p/w SOB on exertion. s/p CHACHO 8/7- demonstrated severe AI and AS to mod-severe TR. No symptoms reported per pt. EF about 25%. Went for TAVR today 8/8.

## 2018-08-08 NOTE — DISCHARGE NOTE ADULT - HOSPITAL COURSE
52 y/o Male non-smoker w/PMH significant for paroxysmal AFib s/p multiple successful cardioversions last one in ED in May and ablation years ago, rheumatic fever as a child, and known hx of cardiac murmur (patient unaware of the specifics, but has been told by a physician his valve was "calcified"). Patient c/o progressively worsening SOB/OCONNOR progressing over several months worse last several weeks. Patient was sent in from cardiologist to the ED due to his symptoms and ECG that showed V4-V6 TWI and elevation in AVR. In ED STEMI code was called but then cancelled when prior ECGs were seen to be similar and was determined to be LVH with S-T repolarization abnormalities. Of note, patient reports he has had a negative stress test in the past as well.  Currently denies any symptoms, resting comfortably in bed. Patient underwent cardiac catheterization which revealed no coronary artery disease with severe Aortic stenosis.  On 8/8/2018 the patient underwent aortic valve replacement, MAZE procedure, and left atrial appendage clipping.  Post-operatively ..... 54 y/o Male non-smoker w/PMH significant for paroxysmal AFib s/p multiple successful cardioversions last one in ED in May and ablation years ago, rheumatic fever as a child, and known hx of cardiac murmur (patient unaware of the specifics, but has been told by a physician his valve was "calcified"). Patient c/o progressively worsening SOB/OCONNOR progressing over several months worse last several weeks. Patient was sent in from cardiologist to the ED due to his symptoms and ECG that showed V4-V6 TWI and elevation in AVR. In ED STEMI code was called but then cancelled when prior ECGs were seen to be similar and was determined to be LVH with S-T repolarization abnormalities. Of note, patient reports he has had a negative stress test in the past as well.  Currently denies any symptoms, resting comfortably in bed. Patient underwent cardiac catheterization which revealed no coronary artery disease with severe Aortic stenosis.  On 8/8/2018 the patient underwent aortic valve replacement, MAZE procedure, and left atrial appendage clipping.  Post-operatively the patient had an uneventful course and did well.  He was discharged on POD#5 in stable condition with instructions to follow-up in 1 week as at the outpatient office.

## 2018-08-08 NOTE — PACU DISCHARGE NOTE - COMMENTS
Patient transferred intubated with monitors and oxygen to CTU. Sign out and transfer of care given to CTU team. Patient hemodynamically stable during transfer.     Post OP VS:  BP: 97/48  HR: 72  SaO2: 100%  RR: 11  PAP: 28/10  CVP: 3  CO/I: 5.4/2.4  RR: 11  T 36.7C  SVR: 829

## 2018-08-09 LAB
ALBUMIN SERPL ELPH-MCNC: 4.6 G/DL — SIGNIFICANT CHANGE UP (ref 3.5–5.2)
ALP SERPL-CCNC: 42 U/L — SIGNIFICANT CHANGE UP (ref 30–115)
ALT FLD-CCNC: 51 U/L — HIGH (ref 0–41)
ANION GAP SERPL CALC-SCNC: 16 MMOL/L — HIGH (ref 7–14)
APTT BLD: 27.3 SEC — SIGNIFICANT CHANGE UP (ref 27–39.2)
AST SERPL-CCNC: 83 U/L — HIGH (ref 0–41)
BASOPHILS # BLD AUTO: 0.01 K/UL — SIGNIFICANT CHANGE UP (ref 0–0.2)
BASOPHILS NFR BLD AUTO: 0.1 % — SIGNIFICANT CHANGE UP (ref 0–1)
BILIRUB SERPL-MCNC: 1.2 MG/DL — SIGNIFICANT CHANGE UP (ref 0.2–1.2)
BUN SERPL-MCNC: 18 MG/DL — SIGNIFICANT CHANGE UP (ref 10–20)
CALCIUM SERPL-MCNC: 7.6 MG/DL — LOW (ref 8.5–10.1)
CHLORIDE SERPL-SCNC: 107 MMOL/L — SIGNIFICANT CHANGE UP (ref 98–110)
CO2 SERPL-SCNC: 18 MMOL/L — SIGNIFICANT CHANGE UP (ref 17–32)
CREAT SERPL-MCNC: 0.8 MG/DL — SIGNIFICANT CHANGE UP (ref 0.7–1.5)
EOSINOPHIL # BLD AUTO: 0 K/UL — SIGNIFICANT CHANGE UP (ref 0–0.7)
EOSINOPHIL NFR BLD AUTO: 0 % — SIGNIFICANT CHANGE UP (ref 0–8)
GAS PNL BLDA: SIGNIFICANT CHANGE UP
GLUCOSE SERPL-MCNC: 114 MG/DL — HIGH (ref 70–99)
HCT VFR BLD CALC: 30.6 % — LOW (ref 42–52)
HGB BLD-MCNC: 10.3 G/DL — LOW (ref 14–18)
IMM GRANULOCYTES NFR BLD AUTO: 0.4 % — HIGH (ref 0.1–0.3)
INR BLD: 1.34 RATIO — HIGH (ref 0.65–1.3)
LYMPHOCYTES # BLD AUTO: 0.99 K/UL — LOW (ref 1.2–3.4)
LYMPHOCYTES # BLD AUTO: 8.2 % — LOW (ref 20.5–51.1)
MAGNESIUM SERPL-MCNC: 2.5 MG/DL — HIGH (ref 1.8–2.4)
MCHC RBC-ENTMCNC: 31.7 PG — HIGH (ref 27–31)
MCHC RBC-ENTMCNC: 33.7 G/DL — SIGNIFICANT CHANGE UP (ref 32–37)
MCV RBC AUTO: 94.2 FL — HIGH (ref 80–94)
MONOCYTES # BLD AUTO: 1.6 K/UL — HIGH (ref 0.1–0.6)
MONOCYTES NFR BLD AUTO: 13.2 % — HIGH (ref 1.7–9.3)
NEUTROPHILS # BLD AUTO: 9.48 K/UL — HIGH (ref 1.4–6.5)
NEUTROPHILS NFR BLD AUTO: 78.1 % — HIGH (ref 42.2–75.2)
NRBC # BLD: 0 /100 WBCS — SIGNIFICANT CHANGE UP (ref 0–0)
PLATELET # BLD AUTO: 146 K/UL — SIGNIFICANT CHANGE UP (ref 130–400)
POTASSIUM SERPL-MCNC: 4.9 MMOL/L — SIGNIFICANT CHANGE UP (ref 3.5–5)
POTASSIUM SERPL-SCNC: 4.9 MMOL/L — SIGNIFICANT CHANGE UP (ref 3.5–5)
PROT SERPL-MCNC: 5.7 G/DL — LOW (ref 6–8)
PROTHROM AB SERPL-ACNC: 14.4 SEC — HIGH (ref 9.95–12.87)
RBC # BLD: 3.25 M/UL — LOW (ref 4.7–6.1)
RBC # FLD: 13.8 % — SIGNIFICANT CHANGE UP (ref 11.5–14.5)
SODIUM SERPL-SCNC: 141 MMOL/L — SIGNIFICANT CHANGE UP (ref 135–146)
WBC # BLD: 12.13 K/UL — HIGH (ref 4.8–10.8)
WBC # FLD AUTO: 12.13 K/UL — HIGH (ref 4.8–10.8)

## 2018-08-09 RX ORDER — LISINOPRIL 2.5 MG/1
2.5 TABLET ORAL DAILY
Qty: 0 | Refills: 0 | Status: DISCONTINUED | OUTPATIENT
Start: 2018-08-09 | End: 2018-08-10

## 2018-08-09 RX ORDER — METOPROLOL TARTRATE 50 MG
12.5 TABLET ORAL EVERY 12 HOURS
Qty: 0 | Refills: 0 | Status: DISCONTINUED | OUTPATIENT
Start: 2018-08-09 | End: 2018-08-09

## 2018-08-09 RX ORDER — FENTANYL CITRATE 50 UG/ML
25 INJECTION INTRAVENOUS ONCE
Qty: 0 | Refills: 0 | Status: DISCONTINUED | OUTPATIENT
Start: 2018-08-09 | End: 2018-08-09

## 2018-08-09 RX ORDER — AMLODIPINE BESYLATE 2.5 MG/1
5 TABLET ORAL DAILY
Qty: 0 | Refills: 0 | Status: DISCONTINUED | OUTPATIENT
Start: 2018-08-09 | End: 2018-08-10

## 2018-08-09 RX ORDER — FUROSEMIDE 40 MG
40 TABLET ORAL ONCE
Qty: 0 | Refills: 0 | Status: COMPLETED | OUTPATIENT
Start: 2018-08-09 | End: 2018-08-09

## 2018-08-09 RX ORDER — FAMOTIDINE 10 MG/ML
20 INJECTION INTRAVENOUS
Qty: 0 | Refills: 0 | Status: DISCONTINUED | OUTPATIENT
Start: 2018-08-09 | End: 2018-08-13

## 2018-08-09 RX ORDER — ACETAMINOPHEN 500 MG
650 TABLET ORAL EVERY 6 HOURS
Qty: 0 | Refills: 0 | Status: DISCONTINUED | OUTPATIENT
Start: 2018-08-09 | End: 2018-08-13

## 2018-08-09 RX ORDER — AMIODARONE HYDROCHLORIDE 400 MG/1
400 TABLET ORAL EVERY 8 HOURS
Qty: 0 | Refills: 0 | Status: DISCONTINUED | OUTPATIENT
Start: 2018-08-09 | End: 2018-08-11

## 2018-08-09 RX ADMIN — FAMOTIDINE 20 MILLIGRAM(S): 10 INJECTION INTRAVENOUS at 17:10

## 2018-08-09 RX ADMIN — AMIODARONE HYDROCHLORIDE 400 MILLIGRAM(S): 400 TABLET ORAL at 11:46

## 2018-08-09 RX ADMIN — Medication 30 MILLIGRAM(S): at 22:59

## 2018-08-09 RX ADMIN — Medication 100 GRAM(S): at 06:07

## 2018-08-09 RX ADMIN — LISINOPRIL 2.5 MILLIGRAM(S): 2.5 TABLET ORAL at 12:26

## 2018-08-09 RX ADMIN — CHLORHEXIDINE GLUCONATE 5 MILLILITER(S): 213 SOLUTION TOPICAL at 05:47

## 2018-08-09 RX ADMIN — AMLODIPINE BESYLATE 5 MILLIGRAM(S): 2.5 TABLET ORAL at 15:01

## 2018-08-09 RX ADMIN — FAMOTIDINE 20 MILLIGRAM(S): 10 INJECTION INTRAVENOUS at 05:58

## 2018-08-09 RX ADMIN — Medication 325 MILLIGRAM(S): at 11:47

## 2018-08-09 RX ADMIN — FENTANYL CITRATE 25 MICROGRAM(S): 50 INJECTION INTRAVENOUS at 20:35

## 2018-08-09 RX ADMIN — OXYCODONE HYDROCHLORIDE 10 MILLIGRAM(S): 5 TABLET ORAL at 01:44

## 2018-08-09 RX ADMIN — Medication 30 MILLIGRAM(S): at 06:31

## 2018-08-09 RX ADMIN — FENTANYL CITRATE 25 MICROGRAM(S): 50 INJECTION INTRAVENOUS at 20:05

## 2018-08-09 RX ADMIN — Medication 100 MILLIGRAM(S): at 13:09

## 2018-08-09 RX ADMIN — Medication 100 MILLIGRAM(S): at 05:42

## 2018-08-09 RX ADMIN — CHLORHEXIDINE GLUCONATE 5 MILLILITER(S): 213 SOLUTION TOPICAL at 02:21

## 2018-08-09 RX ADMIN — Medication 12.5 MILLIGRAM(S): at 11:51

## 2018-08-09 RX ADMIN — Medication 100 GRAM(S): at 17:11

## 2018-08-09 RX ADMIN — OXYCODONE HYDROCHLORIDE 10 MILLIGRAM(S): 5 TABLET ORAL at 18:08

## 2018-08-09 RX ADMIN — AMIODARONE HYDROCHLORIDE 400 MILLIGRAM(S): 400 TABLET ORAL at 22:29

## 2018-08-09 RX ADMIN — ALBUTEROL 2 PUFF(S): 90 AEROSOL, METERED ORAL at 07:36

## 2018-08-09 RX ADMIN — Medication 650 MILLIGRAM(S): at 19:35

## 2018-08-09 RX ADMIN — OXYCODONE HYDROCHLORIDE 5 MILLIGRAM(S): 5 TABLET ORAL at 13:36

## 2018-08-09 RX ADMIN — Medication 100 MILLIGRAM(S): at 05:47

## 2018-08-09 RX ADMIN — Medication 2 PUFF(S): at 07:36

## 2018-08-09 RX ADMIN — Medication 30 MILLIGRAM(S): at 22:29

## 2018-08-09 RX ADMIN — Medication 40 MILLIGRAM(S): at 16:00

## 2018-08-09 RX ADMIN — OXYCODONE HYDROCHLORIDE 5 MILLIGRAM(S): 5 TABLET ORAL at 13:06

## 2018-08-09 RX ADMIN — Medication 30 MILLIGRAM(S): at 06:01

## 2018-08-09 RX ADMIN — OXYCODONE HYDROCHLORIDE 10 MILLIGRAM(S): 5 TABLET ORAL at 02:14

## 2018-08-09 RX ADMIN — Medication 100 MILLIGRAM(S): at 22:29

## 2018-08-09 RX ADMIN — OXYCODONE HYDROCHLORIDE 10 MILLIGRAM(S): 5 TABLET ORAL at 18:38

## 2018-08-09 RX ADMIN — Medication 30 MILLIGRAM(S): at 13:36

## 2018-08-09 RX ADMIN — Medication 30 MILLIGRAM(S): at 13:09

## 2018-08-09 NOTE — PHYSICAL THERAPY INITIAL EVALUATION ADULT - SPECIFY REASON(S)
Pt still fully lined, including +[place]. PT to f/u when pt has be de-lined and appropriate to participate in b/s PT.

## 2018-08-09 NOTE — PHYSICAL THERAPY INITIAL EVALUATION ADULT - SPECIFY REASON(S)
PT attempted to f/u with pt however upon entering room, pt encountered just sitting down in chair.Pt just returned from [facility] on unit with NSG staff, now requesting to rest. PT to f/u as appropriate. PT attempted to f/u with pt however upon entering room, pt encountered just sitting down in chair.Pt just returned from [facility] on unit with NSG staff, now requesting to rest.

## 2018-08-09 NOTE — PROGRESS NOTE ADULT - SUBJECTIVE AND OBJECTIVE BOX
JOCELYN SUNG  MRN#: [id number]  Subjective:  Patient was seen and evalauted on AM rounds     OBJECTIVE:  ICU Vital Signs Last 24 Hrs  T(C): 37.6 (09 Aug 2018 10:00), Max: 37.6 (09 Aug 2018 10:00)  T(F): 99.6 (09 Aug 2018 10:00), Max: 99.6 (09 Aug 2018 10:00)  HR: 68 (09 Aug 2018 10:30) (58 - 76)  BP: 109/66 (09 Aug 2018 05:00) (83/55 - 109/66)  BP(mean): 79 (09 Aug 2018 05:00) (62 - 79)  ABP: 100/54 (09 Aug 2018 10:30) (90/44 - 130/68)  ABP(mean): 68 (09 Aug 2018 10:30) (60 - 88)  RR: 16 (09 Aug 2018 10:30) (0 - 22)  SpO2: 97% (09 Aug 2018 10:30) (96% - 100%)       @ 07:  -   @ 07:00  --------------------------------------------------------  IN: 3562 mL / OUT: 1550 mL / NET: 2012 mL     @ 07:01  -   @ 12:03  --------------------------------------------------------  IN: 166 mL / OUT: 154 mL / NET: 12 mL      CAPILLARY BLOOD GLUCOSE  105 (09 Aug 2018 10:00)          PHYSICAL EXAM:Daily Height in cm: 187.96 (08 Aug 2018 14:02)    Daily Weight in k.3 (09 Aug 2018 04:00)  General: WN/WD NAD    HEENT:     + NCAT  + EOMI  - Conjuctival edema   - Icterus   - Thrush   - ETT  - NGT/OGT    Neck:         + FROM    - JVD     - Nodes     - Masses    + Mid-line trachea   - Tracheostomy    Chest:         - Sternal click  - Sternal drainage  + Pacing wires  + Chest tubes  - SubQ emphysema    Lungs:          + CTA   - Rhonchi    - Rales    - Wheezing     - Decreased BS   - Dullness R L    Cardiac:       + S1 + S2    + RRR   - Irregular   - S3  - S4    - Murmurs   - Rub   - Hamman’s sign     Abdomen:    + BS     + Soft    + Non-tender     - Distended    - Organomegaly  - PEG    Extremities:   - Cyanosis U/L   - Clubbing  U/L  - LE/UE Edema   + Capillary refill    + Pulses     Neuro:        + Awake   +  Alert   - Confused   - Lethargic   - Sedated   - Generalized Weakness    Skin:        - Rashes    - Erythema   + Normal incisions   + IV sites intact  - Sacral decubitus    HOSPITAL MEDICATIONS:  MEDICATIONS  (STANDING):  amiodarone    Tablet 400 milliGRAM(s) Oral every 8 hours  aspirin enteric coated 325 milliGRAM(s) Oral daily  dextrose 50% Injectable 50 milliLiter(s) IV Push every 15 minutes  dextrose 50% Injectable 25 milliLiter(s) IV Push every 15 minutes  docusate sodium 100 milliGRAM(s) Oral three times a day  famotidine    Tablet 20 milliGRAM(s) Oral two times a day  insulin Infusion 1 Unit(s)/Hr (1 mL/Hr) IV Continuous <Continuous>  ketorolac   Injectable 30 milliGRAM(s) IV Push every 8 hours  lisinopril 2.5 milliGRAM(s) Oral daily  magnesium sulfate  IVPB 1 Gram(s) IV Intermittent every 12 hours  nitroglycerin  Infusion 5 MICROgram(s)/Min (1.5 mL/Hr) IV Continuous <Continuous>  sodium chloride 0.9%. 1000 milliLiter(s) (20 mL/Hr) IV Continuous <Continuous>    MEDICATIONS  (PRN):  oxyCODONE    IR 5 milliGRAM(s) Oral every 6 hours PRN Mild Pain (1 - 3)  oxyCODONE    IR 10 milliGRAM(s) Oral every 6 hours PRN Moderate Pain (4 - 6)  senna 1 Tablet(s) Oral every 12 hours PRN Constipation      LABS:                        10.3   12.13 )-----------( 146      ( 09 Aug 2018 02:20 )             30.6    08-09    141  |  107  |  18  ----------------------------<  114<H>  4.9   |  18  |  0.8    Ca    7.6<L>      09 Aug 2018 02:20  Mg     2.5         TPro  5.7<L>  /  Alb  4.6  /  TBili  1.2  /  DBili  x   /  AST  83<H>  /  ALT  51<H>  /  AlkPhos  42      PT/INR - ( 09 Aug 2018 02:20 )   PT: 14.40 sec;   INR: 1.34 ratio         PTT - ( 09 Aug 2018 02:20 )  PTT:27.3 sec LIVER FUNCTIONS - ( 09 Aug 2018 02:20 )  Alb: 4.6 g/dL / Pro: 5.7 g/dL / ALK PHOS: 42 U/L / ALT: 51 U/L / AST: 83 U/L / GGT: x               RADIOLOGY:  X Reviewed and interpreted by me: L-base atelectasis    CARDIOPULMONARY DYSFUNCTION  - Respiratory status required supplemental oxygen & the following of continuous pulse oximetry for support & to prevent decompensation  - Continued early mobilization as tolerated  - Addressed analgesic regimen to optimize function    PREVENTION-PROPHYLAXIS  - Lovenox/Heparin initiated/continued for VTE prophylaxis in addition to Venodyne boots  - Protonix/Pepcid maintained for GI bleeding prophylaxis  - Lopressor initiated/continued for atrial fibrillation prophylaxis  - Metabolic stability & infection prophylaxis required review and adjustment of regular Insulin sliding scale and gylcemic regimen while following serial glucose levels to help achieve & maintain euglycemia  - Reviewed & addressed surgical site infection prophylaxis regimen

## 2018-08-09 NOTE — PROGRESS NOTE ADULT - SUBJECTIVE AND OBJECTIVE BOX
OPERATIVE PROCEDURE(s):                POD #                       53yMale  SURGEON(s): KAN Boggs  SUBJECTIVE ASSESSMENT:   Vital Signs Last 24 Hrs  T(F): 97.7 (09 Aug 2018 07:00), Max: 99.3 (08 Aug 2018 16:00)  HR: 62 (09 Aug 2018 07:00) (58 - 76)  BP: 109/66 (09 Aug 2018 05:00) (83/55 - 109/66)  BP(mean): 79 (09 Aug 2018 05:00) (62 - 79)  ABP: 104/50 (09 Aug 2018 07:00) (90/44 - 130/68)  ABP(mean): 66 (09 Aug 2018 07:00)  RR: 13 (09 Aug 2018 07:00) (0 - 20)  SpO2: 99% (09 Aug 2018 07:00) (97% - 100%)  CVP(mm Hg): 10 (09 Aug 2018 07:00)  CVP(cm H2O): --  CO: 6.2 (09 Aug 2018 05:23)  CI: 2.7 (09 Aug 2018 05:23)  PA: 27/10 (09 Aug 2018 07:00)  SVR: 683 (09 Aug 2018 05:23)  Mode: CPAP with PS  FiO2: 40  PEEP: 5  PS: 5    I&O's Detail    08 Aug 2018 07:01  -  09 Aug 2018 07:00  --------------------------------------------------------  IN:    Albumin 5%  - 500 mL: 500 mL    dexmedetomidine Infusion: 50 mL    insulin Infusion: 15 mL    insulin Infusion: 33 mL    IV PiggyBack: 2250 mL    niCARdipine Infusion: 45 mL    nitroglycerin  Infusion: 40 mL    norepinephrine Infusion: 25 mL    Oral Fluid: 200 mL    sodium chloride 0.9%.: 380 mL    vasopressin Infusion: 24 mL  Total IN: 3562 mL    OUT:    Chest Tube: 470 mL    Indwelling Catheter - Urethral: 1080 mL  Total OUT: 1550 mL        Net:   I&O's Detail    07 Aug 2018 07:01  -  08 Aug 2018 07:00  --------------------------------------------------------  Total NET: 730 mL      08 Aug 2018 07:01  -  09 Aug 2018 07:00  --------------------------------------------------------  Total NET: 2012 mL        CAPILLARY BLOOD GLUCOSE  129 (09 Aug 2018 07:00)  117 (09 Aug 2018 03:00)  117 (09 Aug 2018 01:28)  123 (08 Aug 2018 23:30)  130 (08 Aug 2018 21:26)  135 (08 Aug 2018 20:00)  124 (08 Aug 2018 19:00)  119 (08 Aug 2018 18:00)  126 (08 Aug 2018 17:00)  147 (08 Aug 2018 15:49)  129 (08 Aug 2018 14:00)        Physical Exam:  General: NAD; A&Ox3/Patient is intubated and sedated  Cardiac: S1/S2, RRR, no murmur, no rubs  Lungs: unlabored respirations, CTA b/l, no wheeze, no rales, no crackles  Abdomen: Soft/NT/ND; positive bowel sounds x 4  Sternum: Intact, no click, incision healing well with no drainage  Incisions: Incisions clean/dry/intact  Extremities: No edema b/l lower extremities; good capillary refill; no cyanosis; palpable 1+ pedal pulses b/l    Central Venous Catheter: Yes[]  No[] , If Yes indication:           Day #  Juarez Catheter: Yes  [] , No  [] , If yes indication:                      Day #  NGT: Yes [] No [] ,    If Yes Placement:                                     Day #  EPICARDIAL WIRES:  [] YES [] NO                                              Day #  BOWEL MOVEMENT:  [] YES [] NO, If No, Timing since last BM:      Day #  CHEST TUBE(Left/Right):  [] YES [] NO, If yes -  AIR LEAKS:  [] YES [] NO        LABS:                        10.3<L>  12.13<H> )-----------( 146      ( 09 Aug 2018 02:20 )             30.6<L>                        12.1<L>  18.76<H> )-----------( 146      ( 08 Aug 2018 15:23 )             36.1<L>    08-09    141  |  107  |  18  ----------------------------<  114<H>  4.9   |  18  |  0.8  08-08    146  |  110  |  13  ----------------------------<  129<H>  4.5   |  22  |  0.8    Ca    7.6<L>      09 Aug 2018 02:20  Mg     2.5     08-09    TPro  5.7<L> [6.0 - 8.0]  /  Alb  4.6 [3.5 - 5.2]  /  TBili  1.2 [0.2 - 1.2]  /  DBili  x   /  AST  83<H> [0 - 41]  /  ALT  51<H> [0 - 41]  /  AlkPhos  42 [30 - 115]  08-09    PT/INR - ( 09 Aug 2018 02:20 )   PT: ;   INR: 1.34 ratio         PT/INR - ( 08 Aug 2018 15:23 )   PT: ;   INR: 1.31 ratio         PTT - ( 09 Aug 2018 02:20 )  PTT:27.3 sec, PTT - ( 08 Aug 2018 15:23 )  PTT:28.0 sec    ABG - ( 09 Aug 2018 04:27 )  pH: 7.36  /  pCO2: 35    /  pO2: 125   / HCO3: 20    / Base Excess: -5.2  /  SaO2: 99    /  LA: 1.4              RADIOLOGY & ADDITIONAL TESTS:  CXR:  EKG:  MEDICATIONS  (STANDING):  ALBUTerol    90 MICROgram(s) HFA Inhaler 2 Puff(s) Inhalation every 6 hours  aspirin enteric coated 325 milliGRAM(s) Oral daily  aspirin Suppository 300 milliGRAM(s) Rectal once  chlorhexidine 0.12% Liquid 5 milliLiter(s) Swish and Spit every 4 hours  dexmedetomidine Infusion 0.5 MICROgram(s)/kG/Hr (12.4 mL/Hr) IV Continuous <Continuous>  dextrose 50% Injectable 50 milliLiter(s) IV Push every 15 minutes  dextrose 50% Injectable 25 milliLiter(s) IV Push every 15 minutes  docusate sodium 100 milliGRAM(s) Oral three times a day  famotidine Injectable 20 milliGRAM(s) IV Push every 12 hours  insulin Infusion 1 Unit(s)/Hr (1 mL/Hr) IV Continuous <Continuous>  ipratropium 17 MICROgram(s) HFA Inhaler 2 Puff(s) Inhalation every 6 hours  ketorolac   Injectable 30 milliGRAM(s) IV Push every 8 hours  magnesium sulfate  IVPB 1 Gram(s) IV Intermittent every 12 hours  meperidine     Injectable 25 milliGRAM(s) IV Push once  niCARdipine Infusion 5 mG/Hr (25 mL/Hr) IV Continuous <Continuous>  nitroglycerin  Infusion 5 MICROgram(s)/Min (1.5 mL/Hr) IV Continuous <Continuous>  norepinephrine Infusion 0.05 MICROgram(s)/kG/Min (9.3 mL/Hr) IV Continuous <Continuous>  phenylephrine    Infusion 0.1 MICROgram(s)/kG/Min (1.86 mL/Hr) IV Continuous <Continuous>  propofol Infusion 25 MICROgram(s)/kG/Min (14.88 mL/Hr) IV Continuous <Continuous>  sodium chloride 0.9%. 1000 milliLiter(s) (20 mL/Hr) IV Continuous <Continuous>  vasopressin Infusion 0.03 Unit(s)/Min (1.8 mL/Hr) IV Continuous <Continuous>    MEDICATIONS  (PRN):  oxyCODONE    IR 5 milliGRAM(s) Oral every 6 hours PRN Mild Pain (1 - 3)  oxyCODONE    IR 10 milliGRAM(s) Oral every 6 hours PRN Moderate Pain (4 - 6)  senna 1 Tablet(s) Oral every 12 hours PRN Constipation    HEPARIN:  [] YES [] NO  Dose: XX UNITS/HR UNITS Q8H  LOVENOX:[] YES [] NO  Dose: XX mg Q24H  COUMADIN: []  YES [] NO  Dose: XX mg  Q24H  SCD's: YES b/l  GI Prophylaxis: Protonix [], Pepcid [], None [], (Contra-indication:.....)    Post-Op Beta-Blockers: Yes [], No[], If No, then contraindication:  Post-Op Aspirin: Yes [],  No [], If No, then contraindication:  Post-Op Statin: Yes [], No[], If No, then contraindication:  Allergies    penicillins (Unknown)    Intolerances      Ambulation/Activity Status:    Assessment/Plan:  53y Male status-post .....  - Case and plan discussed with CTU Intensivist and CT Surgeon - Dr. Boggs/Anita/Vesna   - Continue CTU supportive care    - Continue DVT/GI prophylaxis  - Incentive Spirometry 10 times an hour  - Continue to advance physical activity as tolerated and continue PT/OT as directed  1. CAD: Continue ASA, statin, BB  2. HTN:   3. A. Fib:   4. COPD/Hypoxia:   5. DM/Glucose Control:     Social Service Disposition: OPERATIVE PROCEDURE(s):   AVR/MAZE             POD #  1                    53yMale  SURGEON(s): KAN Boggs  SUBJECTIVE ASSESSMENT: pt seen and examined. No acute events  Vital Signs Last 24 Hrs  T(F): 97.7 (09 Aug 2018 07:00), Max: 99.3 (08 Aug 2018 16:00)  HR: 62 (09 Aug 2018 07:00) (58 - 76)  BP: 109/66 (09 Aug 2018 05:00) (83/55 - 109/66)  BP(mean): 79 (09 Aug 2018 05:00) (62 - 79)  ABP: 104/50 (09 Aug 2018 07:00) (90/44 - 130/68)  ABP(mean): 66 (09 Aug 2018 07:00)  RR: 13 (09 Aug 2018 07:00) (0 - 20)  SpO2: 99% (09 Aug 2018 07:00) (97% - 100%)  CVP(mm Hg): 10 (09 Aug 2018 07:00)  CO: 6.2 (09 Aug 2018 05:23)  CI: 2.7 (09 Aug 2018 05:23)  PA: 27/10 (09 Aug 2018 07:00)  SVR: 683 (09 Aug 2018 05:23)  Mode: CPAP with PS  FiO2: 40  PEEP: 5  PS: 5    I&O's Detail    08 Aug 2018 07:01  -  09 Aug 2018 07:00  --------------------------------------------------------  IN:    Albumin 5%  - 500 mL: 500 mL    dexmedetomidine Infusion: 50 mL    insulin Infusion: 15 mL    insulin Infusion: 33 mL    IV PiggyBack: 2250 mL    niCARdipine Infusion: 45 mL    nitroglycerin  Infusion: 40 mL    norepinephrine Infusion: 25 mL    Oral Fluid: 200 mL    sodium chloride 0.9%.: 380 mL    vasopressin Infusion: 24 mL  Total IN: 3562 mL    OUT:    Chest Tube: 470 mL    Indwelling Catheter - Urethral: 1080 mL  Total OUT: 1550 mL        Net:   I&O's Detail    07 Aug 2018 07:01  -  08 Aug 2018 07:00  --------------------------------------------------------  Total NET: 730 mL      08 Aug 2018 07:01  -  09 Aug 2018 07:00  --------------------------------------------------------  Total NET: 2012 mL        CAPILLARY BLOOD GLUCOSE  129 (09 Aug 2018 07:00)  117 (09 Aug 2018 03:00)  117 (09 Aug 2018 01:28)  123 (08 Aug 2018 23:30)  130 (08 Aug 2018 21:26)  135 (08 Aug 2018 20:00)  124 (08 Aug 2018 19:00)  119 (08 Aug 2018 18:00)  126 (08 Aug 2018 17:00)  147 (08 Aug 2018 15:49)  129 (08 Aug 2018 14:00)        Physical Exam:  General: NAD; A&Ox3  Cardiac: S1/S2, RRR, no murmur, no rubs  Lungs: decreased bs at bases  Abdomen: Soft/NT/ND; positive bowel sounds x 4  Sternum: Intact, no click, incision healing well with no drainage  Incisions: Incisions clean/dry/intact  Extremities: No edema b/l lower extremities; good capillary refill; no cyanosis; palpable 1+ pedal pulses b/l    Central Venous Catheter: Yes[x]  No[] , If Yes indication:           Day #1  Juarez Catheter: Yes  [x] , No  [] , If yes indication:                      Day #1  NGT: Yes [] No [x] ,    If Yes Placement:                                     Day #  EPICARDIAL WIRES:  [x] YES [] NO                                              Day #1  BOWEL MOVEMENT:  [] YES [x] NO, If No, Timing since last BM:      Day #  CHEST TUBE(Left/Right):  [x] YES [] NO, If yes -  AIR LEAKS:  [] YES [] NO        LABS:                        10.3<L>  12.13<H> )-----------( 146      ( 09 Aug 2018 02:20 )             30.6<L>                        12.1<L>  18.76<H> )-----------( 146      ( 08 Aug 2018 15:23 )             36.1<L>    08-09    141  |  107  |  18  ----------------------------<  114<H>  4.9   |  18  |  0.8  08-08    146  |  110  |  13  ----------------------------<  129<H>  4.5   |  22  |  0.8    Ca    7.6<L>      09 Aug 2018 02:20  Mg     2.5     08-09    TPro  5.7<L> [6.0 - 8.0]  /  Alb  4.6 [3.5 - 5.2]  /  TBili  1.2 [0.2 - 1.2]  /  DBili  x   /  AST  83<H> [0 - 41]  /  ALT  51<H> [0 - 41]  /  AlkPhos  42 [30 - 115]  08-09    PT/INR - ( 09 Aug 2018 02:20 )   PT: ;   INR: 1.34 ratio         PT/INR - ( 08 Aug 2018 15:23 )   PT: ;   INR: 1.31 ratio         PTT - ( 09 Aug 2018 02:20 )  PTT:27.3 sec, PTT - ( 08 Aug 2018 15:23 )  PTT:28.0 sec    ABG - ( 09 Aug 2018 04:27 )  pH: 7.36  /  pCO2: 35    /  pO2: 125   / HCO3: 20    / Base Excess: -5.2  /  SaO2: 99    /  LA: 1.4          RADIOLOGY & ADDITIONAL TESTS:  CXR: < from: Xray Chest 1 View- PORTABLE-Routine (08.09.18 @ 04:45) >  Impression:      Support devices as above.    Left basilar linear atelectasis.    < end of copied text >    EKG:  MEDICATIONS  (STANDING):  ALBUTerol    90 MICROgram(s) HFA Inhaler 2 Puff(s) Inhalation every 6 hours  aspirin enteric coated 325 milliGRAM(s) Oral daily  aspirin Suppository 300 milliGRAM(s) Rectal once  chlorhexidine 0.12% Liquid 5 milliLiter(s) Swish and Spit every 4 hours  dexmedetomidine Infusion 0.5 MICROgram(s)/kG/Hr (12.4 mL/Hr) IV Continuous <Continuous>  dextrose 50% Injectable 50 milliLiter(s) IV Push every 15 minutes  dextrose 50% Injectable 25 milliLiter(s) IV Push every 15 minutes  docusate sodium 100 milliGRAM(s) Oral three times a day  famotidine Injectable 20 milliGRAM(s) IV Push every 12 hours  insulin Infusion 1 Unit(s)/Hr (1 mL/Hr) IV Continuous <Continuous>  ipratropium 17 MICROgram(s) HFA Inhaler 2 Puff(s) Inhalation every 6 hours  ketorolac   Injectable 30 milliGRAM(s) IV Push every 8 hours  magnesium sulfate  IVPB 1 Gram(s) IV Intermittent every 12 hours  meperidine     Injectable 25 milliGRAM(s) IV Push once  niCARdipine Infusion 5 mG/Hr (25 mL/Hr) IV Continuous <Continuous>  nitroglycerin  Infusion 5 MICROgram(s)/Min (1.5 mL/Hr) IV Continuous <Continuous>  norepinephrine Infusion 0.05 MICROgram(s)/kG/Min (9.3 mL/Hr) IV Continuous <Continuous>  phenylephrine    Infusion 0.1 MICROgram(s)/kG/Min (1.86 mL/Hr) IV Continuous <Continuous>  propofol Infusion 25 MICROgram(s)/kG/Min (14.88 mL/Hr) IV Continuous <Continuous>  sodium chloride 0.9%. 1000 milliLiter(s) (20 mL/Hr) IV Continuous <Continuous>  vasopressin Infusion 0.03 Unit(s)/Min (1.8 mL/Hr) IV Continuous <Continuous>    MEDICATIONS  (PRN):  oxyCODONE    IR 5 milliGRAM(s) Oral every 6 hours PRN Mild Pain (1 - 3)  oxyCODONE    IR 10 milliGRAM(s) Oral every 6 hours PRN Moderate Pain (4 - 6)  senna 1 Tablet(s) Oral every 12 hours PRN Constipation    HEPARIN:  [] YES [x] NO  Dose: XX UNITS/HR UNITS Q8H  LOVENOX:[] YES [x] NO  Dose: XX mg Q24H  COUMADIN: []  YES [x] NO  Dose: XX mg  Q24H  SCD's: YES b/l  GI Prophylaxis: Protonix [], Pepcid [x], None [], (Contra-indication:.....)    Post-Op Beta-Blockers: Yes [], No[x], If No, then contraindication: low ef  Post-Op Aspirin: Yes [x],  No [], If No, then contraindication:  Post-Op Statin: Yes [], No[x], If No, then contraindication: not indicated  Allergies    penicillins (Unknown)    Intolerances      Ambulation/Activity Status: ambulate     Assessment/Plan:  53y Male status-post AVR/Maze POD#1  - Case and plan discussed with CTU Intensivist and CT Surgeon - Dr. Boggs/Anita/Vesna   - Continue CTU supportive care    - Continue DVT/GI prophylaxis  - Incentive Spirometry 10 times an hour  - Continue to advance physical activity as tolerated and continue PT/OT as directed  1. Continue ASA, no bb due low ejection fraction  2. HTN: lisinopril  3. A. Fib: amio 400 q8  4. COPD/Hypoxia: sat well on room air, cont incentive   5. DM/Glucose Control: sliding scale    Social Service Disposition:  home OPERATIVE PROCEDURE(s):   AVR/MAZE             POD #  1                    53yMale  SURGEON(s): KAN Boggs  SUBJECTIVE ASSESSMENT: pt seen and examined. No acute events  Vital Signs Last 24 Hrs  T(F): 97.7 (09 Aug 2018 07:00), Max: 99.3 (08 Aug 2018 16:00)  HR: 62 (09 Aug 2018 07:00) (58 - 76)  BP: 109/66 (09 Aug 2018 05:00) (83/55 - 109/66)  BP(mean): 79 (09 Aug 2018 05:00) (62 - 79)  ABP: 104/50 (09 Aug 2018 07:00) (90/44 - 130/68)  ABP(mean): 66 (09 Aug 2018 07:00)  RR: 13 (09 Aug 2018 07:00) (0 - 20)  SpO2: 99% (09 Aug 2018 07:00) (97% - 100%)  CVP(mm Hg): 10 (09 Aug 2018 07:00)  CO: 6.2 (09 Aug 2018 05:23)  CI: 2.7 (09 Aug 2018 05:23)  PA: 27/10 (09 Aug 2018 07:00)  SVR: 683 (09 Aug 2018 05:23)  Mode: CPAP with PS  FiO2: 40  PEEP: 5  PS: 5    I&O's Detail    08 Aug 2018 07:01  -  09 Aug 2018 07:00  --------------------------------------------------------  IN:    Albumin 5%  - 500 mL: 500 mL    dexmedetomidine Infusion: 50 mL    insulin Infusion: 15 mL    insulin Infusion: 33 mL    IV PiggyBack: 2250 mL    niCARdipine Infusion: 45 mL    nitroglycerin  Infusion: 40 mL    norepinephrine Infusion: 25 mL    Oral Fluid: 200 mL    sodium chloride 0.9%.: 380 mL    vasopressin Infusion: 24 mL  Total IN: 3562 mL    OUT:    Chest Tube: 470 mL    Indwelling Catheter - Urethral: 1080 mL  Total OUT: 1550 mL        Net:   I&O's Detail    07 Aug 2018 07:01  -  08 Aug 2018 07:00  --------------------------------------------------------  Total NET: 730 mL      08 Aug 2018 07:01  -  09 Aug 2018 07:00  --------------------------------------------------------  Total NET: 2012 mL        CAPILLARY BLOOD GLUCOSE  129 (09 Aug 2018 07:00)  117 (09 Aug 2018 03:00)  117 (09 Aug 2018 01:28)  123 (08 Aug 2018 23:30)  130 (08 Aug 2018 21:26)  135 (08 Aug 2018 20:00)  124 (08 Aug 2018 19:00)  119 (08 Aug 2018 18:00)  126 (08 Aug 2018 17:00)  147 (08 Aug 2018 15:49)  129 (08 Aug 2018 14:00)        Physical Exam:  General: NAD; A&Ox3  Cardiac: S1/S2, RRR, no murmur, no rubs  Lungs: decreased bs at bases  Abdomen: Soft/NT/ND; positive bowel sounds x 4  Sternum: Intact, no click, incision healing well with no drainage  Incisions: Incisions clean/dry/intact  Extremities: No edema b/l lower extremities; good capillary refill; no cyanosis; palpable 1+ pedal pulses b/l    Central Venous Catheter: Yes[x]  No[] , If Yes indication:           Day #1  Juarez Catheter: Yes  [x] , No  [] , If yes indication:                      Day #1  NGT: Yes [] No [x] ,    If Yes Placement:                                     Day #  EPICARDIAL WIRES:  [x] YES [] NO                                              Day #1  BOWEL MOVEMENT:  [] YES [x] NO, If No, Timing since last BM:      Day #  CHEST TUBE(Left/Right):  [x] YES [] NO, If yes -  AIR LEAKS:  [] YES [] NO        LABS:                        10.3<L>  12.13<H> )-----------( 146      ( 09 Aug 2018 02:20 )             30.6<L>                        12.1<L>  18.76<H> )-----------( 146      ( 08 Aug 2018 15:23 )             36.1<L>    08-09    141  |  107  |  18  ----------------------------<  114<H>  4.9   |  18  |  0.8  08-08    146  |  110  |  13  ----------------------------<  129<H>  4.5   |  22  |  0.8    Ca    7.6<L>      09 Aug 2018 02:20  Mg     2.5     08-09    TPro  5.7<L> [6.0 - 8.0]  /  Alb  4.6 [3.5 - 5.2]  /  TBili  1.2 [0.2 - 1.2]  /  DBili  x   /  AST  83<H> [0 - 41]  /  ALT  51<H> [0 - 41]  /  AlkPhos  42 [30 - 115]  08-09    PT/INR - ( 09 Aug 2018 02:20 )   PT: ;   INR: 1.34 ratio         PT/INR - ( 08 Aug 2018 15:23 )   PT: ;   INR: 1.31 ratio         PTT - ( 09 Aug 2018 02:20 )  PTT:27.3 sec, PTT - ( 08 Aug 2018 15:23 )  PTT:28.0 sec    ABG - ( 09 Aug 2018 04:27 )  pH: 7.36  /  pCO2: 35    /  pO2: 125   / HCO3: 20    / Base Excess: -5.2  /  SaO2: 99    /  LA: 1.4          RADIOLOGY & ADDITIONAL TESTS:  CXR: < from: Xray Chest 1 View- PORTABLE-Routine (08.09.18 @ 04:45) >  Impression:      Support devices as above.    Left basilar linear atelectasis.    < end of copied text >    EKG:  MEDICATIONS  (STANDING):  ALBUTerol    90 MICROgram(s) HFA Inhaler 2 Puff(s) Inhalation every 6 hours  aspirin enteric coated 325 milliGRAM(s) Oral daily  aspirin Suppository 300 milliGRAM(s) Rectal once  chlorhexidine 0.12% Liquid 5 milliLiter(s) Swish and Spit every 4 hours  dexmedetomidine Infusion 0.5 MICROgram(s)/kG/Hr (12.4 mL/Hr) IV Continuous <Continuous>  dextrose 50% Injectable 50 milliLiter(s) IV Push every 15 minutes  dextrose 50% Injectable 25 milliLiter(s) IV Push every 15 minutes  docusate sodium 100 milliGRAM(s) Oral three times a day  famotidine Injectable 20 milliGRAM(s) IV Push every 12 hours  insulin Infusion 1 Unit(s)/Hr (1 mL/Hr) IV Continuous <Continuous>  ipratropium 17 MICROgram(s) HFA Inhaler 2 Puff(s) Inhalation every 6 hours  ketorolac   Injectable 30 milliGRAM(s) IV Push every 8 hours  magnesium sulfate  IVPB 1 Gram(s) IV Intermittent every 12 hours  meperidine     Injectable 25 milliGRAM(s) IV Push once  niCARdipine Infusion 5 mG/Hr (25 mL/Hr) IV Continuous <Continuous>  nitroglycerin  Infusion 5 MICROgram(s)/Min (1.5 mL/Hr) IV Continuous <Continuous>  norepinephrine Infusion 0.05 MICROgram(s)/kG/Min (9.3 mL/Hr) IV Continuous <Continuous>  phenylephrine    Infusion 0.1 MICROgram(s)/kG/Min (1.86 mL/Hr) IV Continuous <Continuous>  propofol Infusion 25 MICROgram(s)/kG/Min (14.88 mL/Hr) IV Continuous <Continuous>  sodium chloride 0.9%. 1000 milliLiter(s) (20 mL/Hr) IV Continuous <Continuous>  vasopressin Infusion 0.03 Unit(s)/Min (1.8 mL/Hr) IV Continuous <Continuous>    MEDICATIONS  (PRN):  oxyCODONE    IR 5 milliGRAM(s) Oral every 6 hours PRN Mild Pain (1 - 3)  oxyCODONE    IR 10 milliGRAM(s) Oral every 6 hours PRN Moderate Pain (4 - 6)  senna 1 Tablet(s) Oral every 12 hours PRN Constipation    HEPARIN:  [] YES [x] NO  Dose: XX UNITS/HR UNITS Q8H  LOVENOX:[] YES [x] NO  Dose: XX mg Q24H  COUMADIN: []  YES [x] NO  Dose: XX mg  Q24H  SCD's: YES b/l  GI Prophylaxis: Protonix [], Pepcid [x], None [], (Contra-indication:.....)    Post-Op Beta-Blockers: Yes [], No[x], If No, then contraindication: low ef  Post-Op Aspirin: Yes [x],  No [], If No, then contraindication:  Post-Op Statin: Yes [], No[x], If No, then contraindication: not indicated  Allergies    penicillins (Unknown)    Intolerances      Ambulation/Activity Status: ambulate     Assessment/Plan:  53y Male status-post AVR/Maze POD#1  - Case and plan discussed with CTU Intensivist and CT Surgeon - Dr. Boggs/Anita/Vesna   - Continue CTU supportive care    - Continue DVT/GI prophylaxis  - Incentive Spirometry 10 times an hour  - Continue to advance physical activity as tolerated and continue PT/OT as directed  1. Continue ASA, no bb due low ejection fraction  2. HTN: lisinopril  3. A. Fib: amio 400 q8  4. COPD/Hypoxia: sat well on room air, cont incentive   5. DM/Glucose Control: cont insulin gtt    Social Service Disposition:  home

## 2018-08-09 NOTE — CONSULT NOTE ADULT - ASSESSMENT
IMPRESSION: Rehab of cardiac debilitation    PRECAUTIONS: [x  ] Cardiac  [  ] Respiratory  [  ] Seizures [  ] Contact Isolation  [  ] Droplet Isolation  [  ] Other    Weight Bearing Status:     RECOMMENDATION:    Out of Bed to Chair     DVT/Decubiti Prophylaxis    REHAB PLAN:     [x   ] Bedside P/T 3-5 times a week   [   ]   Bedside O/T  2-3 times a week             [   ] No Rehab Therapy Indicated                   [   ]  Speech Therapy   Conditioning/ROM                                    ADL  Bed Mobility                                               Conditioning/ROM  Transfers                                                     Bed Mobility  Sitting /Standing Balance                         Transfers                                        Gait Training                                               Sitting/Standing Balance  Stair Training [   ]Applicable                    Home equipment Eval                                                                        Splinting  [   ] Only      GOALS:   ADL   [   ]   Independent                    Transfers  [ x  ] Independent                          Ambulation  [ x  ] Independent     [  x  ] With device                            [   ]  CG                                                         [   ]  CG                                                                  [   ] CG                            [    ] Min A                                                   [   ] Min A                                                              [   ] Min  A          DISCHARGE PLAN:   [   ]  Good candidate for Intensive Rehabilitation/Hospital based-[facility]A [facility]                                             Will tolerate 3hrs Intensive Rehab Daily                                       [    ]  Short Term Rehab in Skilled Nursing Facility                                       [ x   ]  Home with Outpatient or  services                                         [    ]  Possible Candidate for Intensive Hospital based Rehab

## 2018-08-09 NOTE — CONSULT NOTE ADULT - SUBJECTIVE AND OBJECTIVE BOX
Patient is a 53y old  Male who presents with a chief complaint of Shortness of breath with exertion (01 Aug 2018 20:16)      HPI:  This is a 52 YO M with PMH AFib s/p cardioversion in the ED in May and ablation years ago, rheumatic fever as a child, and valve disease (patient unaware of the specifics, but has been told by a physician his valve was "calcified"), who presents with shortness of breath for the last few days to week with exertion, as well as intermittent mild chest discomfort also with exertion, which patient initially attributed to gas pain. He underwent bedside cardioversion in the ED May 11th and was DC'd home to follow up with his cardiologist, but was not given any medications since his CHADS-Vasc was 0. When he went to cardio office today he was sent to the ED due to his symptoms and ECG that showed V4-V6 TWI and elevation in AVR. In ED STEMI code was called but then cancelled when prior ECGs were seen to be similar and was determined to be LVH with S-T repolarization abnormalities. Of note, patient reports he has had a negative stress test in the past as well.   Currently denies any symptoms, resting comfortably in bed.    ICU Vital Signs Last 24 Hrs  T(C): 36.7 (01 Aug 2018 18:04), Max: 36.7 (01 Aug 2018 18:04)  T(F): 98 (01 Aug 2018 18:04), Max: 98 (01 Aug 2018 18:04)  HR: 85 (01 Aug 2018 18:04) (84 - 85)  BP: 113/73 (01 Aug 2018 18:04) (113/73 - 113/75)  BP(mean): --  ABP: --  ABP(mean): --  RR: 19 (01 Aug 2018 18:04) (18 - 19)  SpO2: 100% (01 Aug 2018 18:04) (99% - 100%) (01 Aug 2018 20:16)      PAST MEDICAL & SURGICAL HISTORY:  Rheumatic fever in pediatric patient  Afib  H/O right knee surgery  H/O left knee surgery  H/O prior ablation treatment  History of cardioversion    MEDICATIONS  (STANDING):  aspirin enteric coated 81 milliGRAM(s) Oral daily  atorvastatin 80 milliGRAM(s) Oral at bedtime  enoxaparin Injectable 40 milliGRAM(s) SubCutaneous every 24 hours  furosemide   Injectable 40 milliGRAM(s) IV Push daily  metoprolol tartrate 12.5 milliGRAM(s) Oral every 12 hours    MEDICATIONS  (PRN):  acetaminophen   Tablet. 650 milliGRAM(s) Oral every 6 hours PRN Mild Pain (1 - 3)      FAMILY HISTORY:  Family history of heart disease (Father, Grandparent): Father and grandfather  50s-60s due to CAD    Vital Signs Last 24 Hrs  T(C): 35.9 (03 Aug 2018 08:00), Max: 36.6 (03 Aug 2018 04:00)  T(F): 96.7 (03 Aug 2018 08:00), Max: 97.9 (03 Aug 2018 04:00)  HR: 74 (03 Aug 2018 12:00) (66 - 82)  BP: 93/64 (03 Aug 2018 12:00) (85/62 - 137/83)  BP(mean): 86 (03 Aug 2018 10:00) (70 - 118)  RR: 17 (03 Aug 2018 12:00) (11 - 29)  SpO2: 98% (03 Aug 2018 12:00) (95% - 100%)    LABS:                        13.2   10.30 )-----------( 232      ( 03 Aug 2018 04:00 )             38.1         140  |  101  |  17  ----------------------------<  109<H>  4.3   |  26  |  1.0    Ca    9.0      03 Aug 2018 04:00  Mg     1.9         TPro  6.0  /  Alb  4.0  /  TBili  1.6<H>  /  DBili  x   /  AST  20  /  ALT  28  /  AlkPhos  72      Platelet Count - Automated: 232 K/uL [130 - 400] ( @ 04:00)  WBC Count: 10.30 K/uL [4.80 - 10.80] ( @ 04:00)  INR: 1.51 ratio <H> [0.65 - 1.30] ( @ 04:00)  WBC Count: 9.34 K/uL [4.80 - 10.80] ( @ 04:38)  Platelet Count - Automated: 211 K/uL [130 - 400] ( @ 04:38)  INR: 1.47 ratio <H> [0.65 - 1.30] ( @ 04:38)  INR: 1.30 ratio [0.65 - 1.30] ( @ 14:20)  WBC Count: 10.23 K/uL [4.80 - 10.80] ( @ 14:14)  Platelet Count - Automated: 249 K/uL [130 - 400] ( @ 14:14)    Urinalysis Basic - ( 03 Aug 2018 05:40 )    Color: Yellow / Appearance: Cloudy / S.020 / pH: x  Gluc: x / Ketone: Negative  / Bili: Negative / Urobili: 1.0 mg/dL   Blood: x / Protein: 30 mg/dL / Nitrite: Negative   Leuk Esterase: Negative / RBC: 2-5 /HPF / WBC x   Sq Epi: x / Non Sq Epi: x / Bacteria: x      PT/INR - ( 03 Aug 2018 04:00 )   PT: 16.20 sec;   INR: 1.51 ratio         PTT - ( 03 Aug 2018 04:00 )  PTT:31.8 sec    Last Dental Visit: << 3-4 years ago >>    *DENTAL RADIOGRAPHS: panoramic image    *ASSESSMENT: permanent dentition: panoramic radiograph taken and evaluated. No signs of active infection radiographically nor clinically. No mobility noted.     *PLAN: Dental clearance completed for cardiac surgery      Sydnie Georges DDS
Date of Admission: 8/1    CHIEF COMPLAINT: shhortness of breath on exertion    HISTORY OF PRESENT ILLNESS: 53yMale with PMH below presented to the hospital for shortness of breath on exertion that has been gettin gworse over the course of the last week or so. Has some associated chest pains that he thinks of as more related to his indigestion but are, in fact related to his exertion. Most recently he got winded walking up a flight of stairs. He has a history of afib with ablation and cardioversion, rheumatic fever and LVH.     PAST MEDICAL & SURGICAL HISTORY:  Afib  H/O prior ablation treatment  History of cardioversion    HEALTH ISSUES - PROBLEM Dx:        FAMILY HISTORY:    None [ ]  Mother:   Father:   Siblings:     SOCIAL HISTORY:    [ ] Non-smoker  [ ] Smoker  [ ] Alcohol    Allergies    penicillins (Unknown)    Intolerances    	    REVIEW OF SYSTEMS:  as above    PHYSICAL EXAM:  T(C): 35.9 (08-01-18 @ 13:53), Max: 35.9 (08-01-18 @ 13:53)  HR: 84 (08-01-18 @ 13:53) (84 - 84)  BP: 113/75 (08-01-18 @ 13:53) (113/75 - 113/75)  RR: 18 (08-01-18 @ 13:53) (18 - 18)  SpO2: 99% (08-01-18 @ 13:53) (99% - 99%)  Wt(kg): --  I&O's Summary    Daily Height in cm: 187.96 (01 Aug 2018 13:53)    Daily     General Appearance: Normal	  Cardiovascular: Normal S1 S2, No JVD, +systolic murmur, No edema  Respiratory: Lungs clear to auscultation	  Psychiatry: A & O x 3, Mood & affect appropriate  Gastrointestinal:  Soft, Non-tender  Skin: No rashes, No ecchymoses, No cyanosis	  Neurologic: Non-focal  Musculoskeletal/ extremities: Normal range of motion, No clubbing, cyanosis or edema  Vascular: Peripheral pulses palpable 2+ bilaterally    LABS:	 	                    CARDIAC MARKERS:            TELEMETRY EVENTS: 	    ECG:  NSR with LVH with repol abnormality. Unchanged from 5/2018. 	  RADIOLOGY:  OTHER: 	    PREVIOUS DIAGNOSTIC TESTING:    [ ] Echocardiogram:  [ ]  Catheterization:  [ ] Stress Test:  	  	    Home Medications:    MEDICATIONS  (STANDING):  aspirin  chewable 324 milliGRAM(s) Oral once    MEDICATIONS  (PRN):
HPI:  This is a 54 YO M with PMH AFib s/p cardioversion in the ED in May and ablation years ago, rheumatic fever as a child, and valve disease (patient unaware of the specifics, but has been told by a physician his valve was "calcified"), who presents with shortness of breath for the last few days to week with exertion, as well as intermittent mild chest discomfort also with exertion, which patient initially attributed to gas pain. He underwent bedside cardioversion in the ED May 11th and was DC'd home to follow up with his cardiologist, but was not given any medications since his CHADS-Vasc was 0. When he went to cardio office today he was sent to the ED due to his symptoms and ECG that showed V4-V6 TWI and elevation in AVR. In ED STEMI code was called but then cancelled when prior ECGs were seen to be similar and was determined to be LVH with S-T repolarization abnormalities. Of note, patient reports he has had a negative stress test in the past as well.   Currently denies any symptoms, resting comfortably in bed.    ICU Vital Signs Last 24 Hrs  T(C): 36.7 (01 Aug 2018 18:04), Max: 36.7 (01 Aug 2018 18:04)  T(F): 98 (01 Aug 2018 18:04), Max: 98 (01 Aug 2018 18:04)  HR: 85 (01 Aug 2018 18:04) (84 - 85)  BP: 113/73 (01 Aug 2018 18:04) (113/73 - 113/75)  BP(mean): --  ABP: --  ABP(mean): --  RR: 19 (01 Aug 2018 18:04) (18 - 19)  SpO2: 100% (01 Aug 2018 18:04) (99% - 100%) (01 Aug 2018 20:16)      PAST MEDICAL & SURGICAL HISTORY:  Rheumatic fever in pediatric patient  Afib  H/O right knee surgery  H/O left knee surgery  H/O prior ablation treatment  History of cardioversion      Hospital Course:    TODAY'S SUBJECTIVE & REVIEW OF SYMPTOMS:     Constitutional WNL   Cardio WNL   Resp WNL   GI WNL  Heme WNL  Endo WNL  Skin WNL  MSK surgical site pain  Neuro WNL  Cognitive WNL  Psych WNL      MEDICATIONS  (STANDING):  amiodarone    Tablet 400 milliGRAM(s) Oral every 8 hours  aspirin enteric coated 325 milliGRAM(s) Oral daily  dextrose 50% Injectable 50 milliLiter(s) IV Push every 15 minutes  dextrose 50% Injectable 25 milliLiter(s) IV Push every 15 minutes  docusate sodium 100 milliGRAM(s) Oral three times a day  famotidine    Tablet 20 milliGRAM(s) Oral two times a day  insulin Infusion 1 Unit(s)/Hr (1 mL/Hr) IV Continuous <Continuous>  ketorolac   Injectable 30 milliGRAM(s) IV Push every 8 hours  lisinopril 2.5 milliGRAM(s) Oral daily  magnesium sulfate  IVPB 1 Gram(s) IV Intermittent every 12 hours  nitroglycerin  Infusion 5 MICROgram(s)/Min (1.5 mL/Hr) IV Continuous <Continuous>  sodium chloride 0.9%. 1000 milliLiter(s) (20 mL/Hr) IV Continuous <Continuous>    MEDICATIONS  (PRN):  oxyCODONE    IR 5 milliGRAM(s) Oral every 6 hours PRN Mild Pain (1 - 3)  oxyCODONE    IR 10 milliGRAM(s) Oral every 6 hours PRN Moderate Pain (4 - 6)  senna 1 Tablet(s) Oral every 12 hours PRN Constipation      FAMILY HISTORY:  Family history of heart disease (Father, Grandparent): Father and grandfather  50s-60s due to CAD      Allergies    penicillins (Unknown)    Intolerances        SOCIAL HISTORY:    [  ] Etoh  [  ] Smoking  [  ] Substance abuse     Home Environment:  [  ] Home Alone  [ x ] Lives with Family  [  ] Home Health Aid    Dwelling:  [  ] Apartment  [x  ] Private House  [  ] Adult Home  [  ] Skilled Nursing Facility      [  ] Short Term  [  ] Long Term  [x  ] Stairs       Elevator [  ]    FUNCTIONAL STATUS PTA: (Check all that apply)  Ambulation: [x   ]Independent    [  ] Dependent     [  ] Non-Ambulatory  Assistive Device: [  ] SA Cane  [  ]  Q Cane  [  ] Walker  [  ]  Wheelchair  ADL : [ x ] Independent  [  ]  Dependent       Vital Signs Last 24 Hrs  T(C): 36.5 (09 Aug 2018 07:00), Max: 37.4 (08 Aug 2018 16:00)  T(F): 97.7 (09 Aug 2018 07:00), Max: 99.3 (08 Aug 2018 16:00)  HR: 62 (09 Aug 2018 07:00) (58 - 76)  BP: 109/66 (09 Aug 2018 05:00) (83/55 - 109/66)  BP(mean): 79 (09 Aug 2018 05:00) (62 - 79)  RR: 13 (09 Aug 2018 07:00) (0 - 20)  SpO2: 99% (09 Aug 2018 07:00) (97% - 100%)      PHYSICAL EXAM: Alert & Oriented X3  GENERAL: NAD, well-groomed, well-developed  HEAD:  Atraumatic, Normocephalic  CHEST/LUNG: Clear   HEART: S1S2+  ABDOMEN: Soft, Nontender  EXTREMITIES:  no calf tenderness    NERVOUS SYSTEM:  Cranial Nerves 2-12 intact [  ] Abnormal  [  ]  ROM: WFL all extremities [x  ]  Abnormal [  ]  Motor Strength: WFL all extremities  [ x ]  Abnormal [  ]  Sensation: intact to light touch [x  ] Abnormal [  ]  Reflexes: Symmetric [  ]  Abnormal [  ]    FUNCTIONAL STATUS:  Bed Mobility: Independent [  ]  Supervision [  ]  Needs Assistance [ x ]  N/A [  ]  Transfers: Independent [  ]  Supervision [  ]  Needs Assistance [x  ]  N/A [  ]   Ambulation: Independent [  ]  Supervision [  ]  Needs Assistance [  ]  N/A [  ]  ADL: Independent [  ] Requires Assistance [  ] N/A [  ]      LABS:                        10.3   12.13 )-----------( 146      ( 09 Aug 2018 02:20 )             30.6     08-    141  |  107  |  18  ----------------------------<  114<H>  4.9   |  18  |  0.8    Ca    7.6<L>      09 Aug 2018 02:20  Mg     2.5         TPro  5.7<L>  /  Alb  4.6  /  TBili  1.2  /  DBili  x   /  AST  83<H>  /  ALT  51<H>  /  AlkPhos  42  08-09    PT/INR - ( 09 Aug 2018 02:20 )   PT: 14.40 sec;   INR: 1.34 ratio         PTT - ( 09 Aug 2018 02:20 )  PTT:27.3 sec      RADIOLOGY & ADDITIONAL STUDIES:    Assesment:
Surgeon:     Consult requesting by: Dr. Kemp  Cardiologist: Dr. Simmons  PMD: Dr. Erwin    HISTORY OF PRESENT ILLNESS:  52 y/o Male non-smoker w/PMH significant for paroxysmal AFib s/p multiple successful cardioversions last one in ED in May and ablation years ago, rheumatic fever as a child, and known hx of cardiac murmur (patient unaware of the specifics, but has been told by a physician his valve was "calcified"). Patient c/o progressively worsening SOB/OCONNOR progressing over several months worse last several weeks. Patient was sent in from cardiologist to the ED due to his symptoms and ECG that showed V4-V6 TWI and elevation in AVR. In ED STEMI code was called but then cancelled when prior ECGs were seen to be similar and was determined to be LVH with S-T repolarization abnormalities. Of note, patient reports he has had a negative stress test in the past as well.   Currently denies any symptoms, resting comfortably in bed. Patient underwent cardiac catheterization which revealed no coronary artery disease with severe Aortic stenosis.    ICU Vital Signs Last 24 Hrs  T(C): 36.7 (01 Aug 2018 18:04), Max: 36.7 (01 Aug 2018 18:04)  T(F): 98 (01 Aug 2018 18:04), Max: 98 (01 Aug 2018 18:04)  HR: 85 (01 Aug 2018 18:04) (84 - 85)  BP: 113/73 (01 Aug 2018 18:04) (113/73 - 113/75)  RR: 19 (01 Aug 2018 18:04) (18 - 19)  SpO2: 100% (01 Aug 2018 18:04) (99% - 100%) (01 Aug 2018 20:16)      NYHA functional class    [ ] Class I (no limitation) [x] Class II (slight limitation) [ ] Class III (marked limitation) [ ] Class IV (symptoms at rest)    PAST MEDICAL & SURGICAL HISTORY:  Rheumatic fever in pediatric patient  Afib  H/O cholecystectomy  H/O right knee surgery  H/O left knee surgery  H/O prior ablation treatment  History of cardioversion      MEDICATIONS  (STANDING):  aspirin enteric coated 81 milliGRAM(s) Oral daily  atorvastatin 80 milliGRAM(s) Oral at bedtime  enoxaparin Injectable 40 milliGRAM(s) SubCutaneous every 24 hours  furosemide   Injectable 40 milliGRAM(s) IV Push daily  metoprolol tartrate 12.5 milliGRAM(s) Oral every 12 hours    MEDICATIONS  (PRN):  acetaminophen   Tablet. 650 milliGRAM(s) Oral every 6 hours PRN Mild Pain (1 - 3)      Allergies    penicillins (Unknown)    Intolerances      SOCIAL HISTORY:  Smoker: [ ] Yes  [x] No        PACK YEARS:                         WHEN QUIT?  ETOH use: [ ] Yes  [x] No              FREQUENCY / QUANTITY:  Ilicit Drug use:  [ ] Yes  [x] No  Occupation: retired [employer]  Lives with: wife  Assisted device use: N/A  5 meter walk test: 1____sec, 2____sec, 3___sec: unable to asses due to bed rest s/p right groin access for [facility]  FAMILY HISTORY:  Father: CAD @ 50 yoa/AMI @ 57 yoa      Review of Systems  CONSTITUTIONAL:  Fevers[ ] chills[ ] sweats[ ] fatigue[x ] weight loss[ ] weight gain [ ]                                     NEGATIVE [ ]   NEURO:  paresthesias[ ] seizures [ ]  syncope [ ]  confusion [ ]                                                                                NEGATIVE[ X]   EYES: glasses[ ]  blurry vision[ ]  discharge[ ] pain[ ] glaucoma [ ]                                                                          NEGATIVE[X ]   ENMT:  difficulty hearing [ ]  vertigo[ ]  dysphagia[ ] epistaxis[ ] recent dental work [ ]                                    NEGATIVE[ X]   CV:  chest pain[ ] palpitations[x ] OCONNOR [x ] diaphoresis [ ]                                                                                           NEGATIVE[ ]   RESPIRATORY:  wheezing[ ] SOB[x ] cough [ ] sputum[ ] hemoptysis[ ]                                                                  NEGATIVE[ ]   GI:  nausea[ ]  vomiting [ ]  diarrhea[ ] constipation [ ] melena [ ]                                                                         NEGATIVE[ X]   : hematuria[ ]  dysuria[ ] urgency[ ] incontinence[ ]                                                                                            NEGATIVE[ X]   MUSKULOSKELETAL:  arthritis[ ]  joint swelling [ ] muscle weakness [ ] Hx vein stripping [ ]                             NEGATIVE[X ]   SKIN/BREAST:  rash[ ] itching [ ]  hair loss[ ] masses[ ]                                                                                              NEGATIVE[ X]   PSYCH:  dementia [ ] depression [ ] anxiety[ ]                                                                                                               NEGATIVE[X ]   HEME/LYMPH:  bruises easily[ ] enlarged lymph nodes[ ] tender lymph nodes[ ]                                               NEGATIVE[ X]   ENDOCRINE:  cold intolerance[ ] heat intolerance[ ] polydipsia[ ]                                                                          NEGATIVE[ X]     PHYSICAL EXAM  Vital Signs Last 24 Hrs  T(C): 35.6 (02 Aug 2018 08:00), Max: 36.6 (02 Aug 2018 00:00)  T(F): 96 (02 Aug 2018 08:00), Max: 97.9 (02 Aug 2018 00:00)  HR: 80 (02 Aug 2018 18:20) (72 - 90)  BP: 136/96 (02 Aug 2018 18:20) (104/73 - 137/83)  BP(mean): 118 (02 Aug 2018 18:20) (81 - 118)  RR: 27 (02 Aug 2018 18:20) (11 - 27)  SpO2: 95% (02 Aug 2018 18:20) (95% - 100%)    CONSTITUTIONAL:  WNL[ x]   Neuro: WNL [ x] Normal exam oriented to person/place & time with no focal motor or sensory  deficits. Other                     Eyes:    WNL [x ] Normal exam of conjunctiva & lids, pupils equally reactive. Other     ENT:     WNL [x ] Normal exam of nasal/oral mucosa with absence of cyanosis. Other  Neck:   WNL [x ] Normal exam of jugular veins, trachea & thyroid. Other  Chest:  WNL [x ] Normal lung exam with good air movement absence of wheezes, rales, or rhonchi: Other                                                                                CV:  Auscultation: normal [x ] S3[ ] S4[ ] Irregular [ ] Rub[ ] Clicks[ ]    Murmurs none:[ ]systolic [ x]  diastolic [ ] holosystolic [ ]  Carotids: No Bruits[x ] Other                 Abdominal Aorta: normal [ ] nonpalpable[ ]Other                                                                                      GI:           WNL[x ] Normal exam of abdomen, liver & spleen with no noted masses or tenderness. Other                                                                                                        Extremities: WNL[x ] Normal no evidence of cyanosis or deformity Edema: none[ ]trace[ ]1+[ ]2+[ ]3+[ ]4+[ ]  Lower Extremity Pulses: Right[x ] Left[ x]Varicosities[ ]  SKIN :WNL[x ] Normal exam to inspection & palation. Other:                                                          LABS:                        12.4   9.34  )-----------( 211      ( 02 Aug 2018 04:38 )             36.7     08-02    141  |  107  |  13  ----------------------------<  105<H>  4.1   |  20  |  0.8    Ca    8.8      02 Aug 2018 04:38  Mg     2.0     08-02    TPro  5.8<L>  /  Alb  3.8  /  TBili  1.5<H>  /  DBili  0.5<H>  /  AST  22  /  ALT  30  /  AlkPhos  62  08-02    PT/INR - ( 02 Aug 2018 04:38 )   PT: 15.80 sec;   INR: 1.47 ratio      PTT - ( 02 Aug 2018 00:13 )  PTT:35.6 sec    CARDIAC MARKERS ( 02 Aug 2018 04:38 )  x     / 0.10 ng/mL / 75 U/L / x     / 7.4 ng/mL  CARDIAC MARKERS ( 01 Aug 2018 20:00 )  x     / 0.05 ng/mL / 62 U/L / x     / 3.8 ng/mL  CARDIAC MARKERS ( 01 Aug 2018 14:14 )  x     / 0.05 ng/mL / 67 U/L / x     / x              Cardiac Cath: FINDINGS  Left Heart Catheterization:  LVEF%:  LVEDP: 35  [x ] Normal Coronary Arteries  [ ] Luminal Irregularities  [ ] Non-obstructive CAD    RIGHT HEART CATHETERIZATION  PA: mean 58  PCW: 30  CO/CI: 5.2    POST-OP DIAGNOSIS  Critical AS/Severe CM/PHTN      TTE: < from: Transthoracic Echocardiogram (08.02.18 @ 07:11) >  Summary:   1. Severely decreased global left ventricular systolic function.   2. Left atrial enlargement.   3. LV Ejection Fraction by Herrera's Method with a biplane EF of 26 %.   4. Mild mitral valve regurgitation.   5. Thickening of the anterior and posterior mitral valve leaflets.   6. Moderate-severe tricuspid regurgitation.   7. Mild to moderate aortic regurgitation.   8. Pulmonic valve regurgitation.   9. Estimated pulmonary artery systolic pressure is 72.9 mmHg assuming a   right atrial pressure of 15 mmHg, which is consistent with severe   pulmonary hypertension.  10. Peak transaortic gradient is 99.4 mmHg, mean transaortic gradient   equals 60.1 mmHg, the calculated aortic valve area equals 0.70 cm² by the   continuity equation consistent with severe aortic stenosis.    PHYSICIAN INTERPRETATION:  Left Ventricle: The left ventricular internal cavity size is normal. Left   ventricular wall thickness is normal. Global LV systolic function was   severely decreased. LV Ejection Fraction by Herrera's Method with a   biplane EF of 26 %.  Right Ventricle: Normal right ventricular size and function.  Left Atrium: Left atrial enlargement.  Right Atrium: Right atrial enlargement.  Pericardium: There is no evidence of pericardial effusion.  Mitral Valve: Mild mitral valve regurgitation is seen. Thickening of the   anterior and posterior mitral valve leaflets. No evidenceof mitral   stenosis.  Tricuspid Valve: Moderate-severe tricuspid regurgitation is visualized.   Estimated pulmonary artery systolic pressure is 72.9 mmHg assuming a   right atrial pressure of 15 mmHg, which is consistent with severe   pulmonary hypertension.  Aortic Valve: Mild to moderate aortic valve regurgitation is seen. Peak   Av velocity is 4.98 m/s, mean transaortic gradient equals 60.1 mmHg, the   calculated aortic valve area equals 0.70 cm² by the continuity equation   consistent with severe aortic stenosis.  Pulmonic Valve: Mild pulmonic valve regurgitation.  Aorta: The aortic root is normal in size and structure.  Venous: The inferior vena cava was dilated, with respiratory size   variation less than 50%, consistent with elevated right atrial pressure.       2D AND M-MODE MEASUREMENTS (normal ranges within parentheses):  Left Ventricle:                  Normal   Aorta/Left Atrium:               Normal  IVSd (2D):              1.30 cm (0.7-1.1) Left Atrium (Mmode): 4.68 cm   (1.9-4.0)  LVPWd (2D):             1.28 cm (0.7-1.1)  LVIDd (2D):             6.08 cm (3.4-5.7)  LVIDs (2D):             5.06 cm  LV FS (2D):             16.9 %   (>25%)  Relative Wall Thickness  0.42    (<0.42)    SPECTRAL DOPPLER ANALYSIS:  LV DIASTOLIC FUNCTION:  MV Peak E: 1.14 m/s Decel Time: 104 msec  MV Peak A: 0.38 m/s  E/A Ratio: 3.02    Aortic Valve:  AoV VMax:    4.98 m/s  AoV Area, Vmax:    0.71 cm² Vmax Indx:    0.32   cm²/m²  AoV VTI:     1.14 m    AoV Area, VTI:     0.70 cm² VTI Indx:    0.31   cm²/m²  AoV Pk Grad: 99.4 mmHg AoV Area, Mn Grad: 0.74 cm² Mn Grad Indx: 0.33   cm²/m²  AoV Mn Grad: 60.1 mmHg    LVOT Vmax: 0.77 m/s  LVOT VTI:  0.17 m  LVOT Diam: 2.43 cm    Mitral Valve:  MV P1/2 Time: 30.15 msec  MV Area, PHT: 7.30 cm²    Tricuspid Valve and PA/RV Systolic Pressure: TR Max Velocity: 3.80 m/s RA   Pressure: 15 mmHg RVSP/PASP: 72.9 mmHg       [id number] Nelson Kemp M.D., Electronically signed on 8/2/2018 at 9:46:22 AM    < end of copied text >    STS Score: AV Replacement  Risk of Mortality: 0.991%  Morbidity or Mortality: 13.566%  Long Length of Stay: 5.279%  Short Length of Stay: 38.274%  Permanent Stroke: 0.384%  Prolonged Ventilation: 7.179%  DSW Infection: 0.263%  Renal Failure: 1.548%  Reoperation: 6.961%    Impression:  CAD [ ]  Valvular  disease [x ]   Aortic Disease [ ]   KEIKO: Yes[ ] No [ ]   CKD Stage I [ ] , Stage II [ ] , Stage III [ ], Stage IV [ ]   Anemia: Yes [ ], No [ ]  Diabetes :Yes [ ], No [ ]  Acute MI: Yes [ ], No [ ]   Heart Failure: Yes [x ] , No [ ] HFpEF [ ], HFrEF [x ]      Assessment/ Plan: 53y Male with progressively worsening Aortic stenosis now severe...  -Cases and plan discussed with CT surgeon . Initial STS risk assessed and discussed with patient. Evaluation by full heart team pending. Attending note to follow. Pre-op for: surgical AVR    Recommendations:  [] hold Plavix  [x] hold ASA if Pre-op Cardiac Valve surgery and patient without CAD  [x] hold ACEI/ARB/CCB 24 hours prior to planned procedure   [] LUE/RUE precaution for possible radial artery harvest      Labs:  [] CBC  [] CMP  [] PT/INR/PTT  [x] BNP  [x] HgA1c  [x] Type and screen  [x] Urinalysis    Diagnostic studies  [] CT HEAD Nonn-Contrast  [x] CT Chest with /without contrast   [x] Carotid Duplex  [x] PFT: Simple PFT [ x]  Full [ ]  [] ROMA/PVR    Consultations/Evaluations   [] Renal Consult  [] Pulmonary Consult  [] Vascular Consult  [] Dental Consult   [] Hem-Onc Consult   [] GI Consult   [] Other Consultations :

## 2018-08-09 NOTE — PROGRESS NOTE ADULT - ASSESSMENT
Assessment/Plan:  s/p AVR, MAZE-POD #1  1-BP control-start ACE-I  2-serum glucose control-insulin infusion  3-acute blood loss anemia/thrombocytopenia-stable, continue to monitor Hb/Hct/plts daily  4-A-Fib-now NSR-amiodarone load/maintenance

## 2018-08-10 LAB
ALBUMIN SERPL ELPH-MCNC: 4.3 G/DL — SIGNIFICANT CHANGE UP (ref 3.5–5.2)
ALP SERPL-CCNC: 49 U/L — SIGNIFICANT CHANGE UP (ref 30–115)
ALT FLD-CCNC: 42 U/L — HIGH (ref 0–41)
ANION GAP SERPL CALC-SCNC: 14 MMOL/L — SIGNIFICANT CHANGE UP (ref 7–14)
ANION GAP SERPL CALC-SCNC: 16 MMOL/L — HIGH (ref 7–14)
ANISOCYTOSIS BLD QL: SLIGHT — SIGNIFICANT CHANGE UP
AST SERPL-CCNC: 48 U/L — HIGH (ref 0–41)
BASOPHILS # BLD AUTO: 0.03 K/UL — SIGNIFICANT CHANGE UP (ref 0–0.2)
BASOPHILS NFR BLD AUTO: 0.1 % — SIGNIFICANT CHANGE UP (ref 0–1)
BILIRUB DIRECT SERPL-MCNC: 0.3 MG/DL — HIGH (ref 0–0.2)
BILIRUB INDIRECT FLD-MCNC: 0.9 MG/DL — SIGNIFICANT CHANGE UP (ref 0.2–1.2)
BILIRUB SERPL-MCNC: 1.2 MG/DL — SIGNIFICANT CHANGE UP (ref 0.2–1.2)
BUN SERPL-MCNC: 30 MG/DL — HIGH (ref 10–20)
BUN SERPL-MCNC: 31 MG/DL — HIGH (ref 10–20)
CALCIUM SERPL-MCNC: 8.3 MG/DL — LOW (ref 8.5–10.1)
CALCIUM SERPL-MCNC: 8.4 MG/DL — LOW (ref 8.5–10.1)
CHLORIDE SERPL-SCNC: 96 MMOL/L — LOW (ref 98–110)
CHLORIDE SERPL-SCNC: 98 MMOL/L — SIGNIFICANT CHANGE UP (ref 98–110)
CO2 SERPL-SCNC: 19 MMOL/L — SIGNIFICANT CHANGE UP (ref 17–32)
CO2 SERPL-SCNC: 21 MMOL/L — SIGNIFICANT CHANGE UP (ref 17–32)
CREAT SERPL-MCNC: 0.9 MG/DL — SIGNIFICANT CHANGE UP (ref 0.7–1.5)
CREAT SERPL-MCNC: 1.2 MG/DL — SIGNIFICANT CHANGE UP (ref 0.7–1.5)
EOSINOPHIL # BLD AUTO: 0 K/UL — SIGNIFICANT CHANGE UP (ref 0–0.7)
EOSINOPHIL NFR BLD AUTO: 0 % — SIGNIFICANT CHANGE UP (ref 0–8)
GLUCOSE SERPL-MCNC: 103 MG/DL — HIGH (ref 70–99)
GLUCOSE SERPL-MCNC: 133 MG/DL — HIGH (ref 70–99)
HCT VFR BLD CALC: 33.8 % — LOW (ref 42–52)
HCT VFR BLD CALC: 34.5 % — LOW (ref 42–52)
HGB BLD-MCNC: 11.3 G/DL — LOW (ref 14–18)
HGB BLD-MCNC: 11.4 G/DL — LOW (ref 14–18)
IMM GRANULOCYTES NFR BLD AUTO: 1.3 % — HIGH (ref 0.1–0.3)
LYMPHOCYTES # BLD AUTO: 2.18 K/UL — SIGNIFICANT CHANGE UP (ref 1.2–3.4)
LYMPHOCYTES # BLD AUTO: 9.2 % — LOW (ref 20.5–51.1)
MACROCYTES BLD QL: SLIGHT — SIGNIFICANT CHANGE UP
MAGNESIUM SERPL-MCNC: 2.6 MG/DL — HIGH (ref 1.8–2.4)
MANUAL SMEAR VERIFICATION: SIGNIFICANT CHANGE UP
MCHC RBC-ENTMCNC: 31.1 PG — HIGH (ref 27–31)
MCHC RBC-ENTMCNC: 31.2 PG — HIGH (ref 27–31)
MCHC RBC-ENTMCNC: 33 G/DL — SIGNIFICANT CHANGE UP (ref 32–37)
MCHC RBC-ENTMCNC: 33.4 G/DL — SIGNIFICANT CHANGE UP (ref 32–37)
MCV RBC AUTO: 93.1 FL — SIGNIFICANT CHANGE UP (ref 80–94)
MCV RBC AUTO: 94.5 FL — HIGH (ref 80–94)
MONOCYTES # BLD AUTO: 3.41 K/UL — HIGH (ref 0.1–0.6)
MONOCYTES NFR BLD AUTO: 14.3 % — HIGH (ref 1.7–9.3)
NEUTROPHILS # BLD AUTO: 17.88 K/UL — HIGH (ref 1.4–6.5)
NEUTROPHILS NFR BLD AUTO: 75.1 % — SIGNIFICANT CHANGE UP (ref 42.2–75.2)
NRBC # BLD: 0 /100 WBCS — SIGNIFICANT CHANGE UP (ref 0–0)
NRBC # BLD: 0 /100 WBCS — SIGNIFICANT CHANGE UP (ref 0–0)
NRBC # BLD: 0 /100 — SIGNIFICANT CHANGE UP (ref 0–0)
PLAT MORPH BLD: SIGNIFICANT CHANGE UP
PLATELET # BLD AUTO: 172 K/UL — SIGNIFICANT CHANGE UP (ref 130–400)
PLATELET # BLD AUTO: 185 K/UL — SIGNIFICANT CHANGE UP (ref 130–400)
POTASSIUM SERPL-MCNC: 4.8 MMOL/L — SIGNIFICANT CHANGE UP (ref 3.5–5)
POTASSIUM SERPL-MCNC: 5 MMOL/L — SIGNIFICANT CHANGE UP (ref 3.5–5)
POTASSIUM SERPL-SCNC: 4.8 MMOL/L — SIGNIFICANT CHANGE UP (ref 3.5–5)
POTASSIUM SERPL-SCNC: 5 MMOL/L — SIGNIFICANT CHANGE UP (ref 3.5–5)
PROT SERPL-MCNC: 5.7 G/DL — LOW (ref 6–8)
RBC # BLD: 3.63 M/UL — LOW (ref 4.7–6.1)
RBC # BLD: 3.65 M/UL — LOW (ref 4.7–6.1)
RBC # FLD: 13.5 % — SIGNIFICANT CHANGE UP (ref 11.5–14.5)
RBC # FLD: 13.7 % — SIGNIFICANT CHANGE UP (ref 11.5–14.5)
RBC BLD AUTO: NORMAL — SIGNIFICANT CHANGE UP
SODIUM SERPL-SCNC: 131 MMOL/L — LOW (ref 135–146)
SODIUM SERPL-SCNC: 133 MMOL/L — LOW (ref 135–146)
VARIANT LYMPHS # BLD: 5 % — SIGNIFICANT CHANGE UP (ref 0–5)
WBC # BLD: 21.36 K/UL — HIGH (ref 4.8–10.8)
WBC # BLD: 23.8 K/UL — HIGH (ref 4.8–10.8)
WBC # FLD AUTO: 21.36 K/UL — HIGH (ref 4.8–10.8)
WBC # FLD AUTO: 23.8 K/UL — HIGH (ref 4.8–10.8)

## 2018-08-10 RX ORDER — GLUCAGON INJECTION, SOLUTION 0.5 MG/.1ML
1 INJECTION, SOLUTION SUBCUTANEOUS ONCE
Qty: 0 | Refills: 0 | Status: DISCONTINUED | OUTPATIENT
Start: 2018-08-10 | End: 2018-08-11

## 2018-08-10 RX ORDER — ALBUMIN HUMAN 25 %
500 VIAL (ML) INTRAVENOUS ONCE
Qty: 0 | Refills: 0 | Status: COMPLETED | OUTPATIENT
Start: 2018-08-10 | End: 2018-08-10

## 2018-08-10 RX ORDER — FUROSEMIDE 40 MG
20 TABLET ORAL ONCE
Qty: 0 | Refills: 0 | Status: COMPLETED | OUTPATIENT
Start: 2018-08-10 | End: 2018-08-10

## 2018-08-10 RX ORDER — COLCHICINE 0.6 MG
0.6 TABLET ORAL ONCE
Qty: 0 | Refills: 0 | Status: COMPLETED | OUTPATIENT
Start: 2018-08-10 | End: 2018-08-10

## 2018-08-10 RX ORDER — INSULIN LISPRO 100/ML
VIAL (ML) SUBCUTANEOUS
Qty: 0 | Refills: 0 | Status: DISCONTINUED | OUTPATIENT
Start: 2018-08-10 | End: 2018-08-11

## 2018-08-10 RX ORDER — TEMAZEPAM 15 MG/1
15 CAPSULE ORAL ONCE
Qty: 0 | Refills: 0 | Status: DISCONTINUED | OUTPATIENT
Start: 2018-08-10 | End: 2018-08-10

## 2018-08-10 RX ORDER — VASOPRESSIN 20 [USP'U]/ML
0.03 INJECTION INTRAVENOUS
Qty: 100 | Refills: 0 | Status: DISCONTINUED | OUTPATIENT
Start: 2018-08-10 | End: 2018-08-10

## 2018-08-10 RX ORDER — SODIUM CHLORIDE 9 MG/ML
1000 INJECTION, SOLUTION INTRAVENOUS
Qty: 0 | Refills: 0 | Status: DISCONTINUED | OUTPATIENT
Start: 2018-08-10 | End: 2018-08-11

## 2018-08-10 RX ORDER — DEXTROSE 50 % IN WATER 50 %
15 SYRINGE (ML) INTRAVENOUS ONCE
Qty: 0 | Refills: 0 | Status: DISCONTINUED | OUTPATIENT
Start: 2018-08-10 | End: 2018-08-11

## 2018-08-10 RX ADMIN — AMIODARONE HYDROCHLORIDE 400 MILLIGRAM(S): 400 TABLET ORAL at 08:13

## 2018-08-10 RX ADMIN — Medication 20 MILLIGRAM(S): at 09:21

## 2018-08-10 RX ADMIN — Medication 250 MILLILITER(S): at 01:40

## 2018-08-10 RX ADMIN — Medication 325 MILLIGRAM(S): at 12:04

## 2018-08-10 RX ADMIN — Medication 0.6 MILLIGRAM(S): at 17:21

## 2018-08-10 RX ADMIN — FAMOTIDINE 20 MILLIGRAM(S): 10 INJECTION INTRAVENOUS at 17:24

## 2018-08-10 RX ADMIN — SENNA PLUS 1 TABLET(S): 8.6 TABLET ORAL at 06:37

## 2018-08-10 RX ADMIN — AMIODARONE HYDROCHLORIDE 400 MILLIGRAM(S): 400 TABLET ORAL at 21:48

## 2018-08-10 RX ADMIN — Medication 100 MILLIGRAM(S): at 21:49

## 2018-08-10 RX ADMIN — FAMOTIDINE 20 MILLIGRAM(S): 10 INJECTION INTRAVENOUS at 06:36

## 2018-08-10 RX ADMIN — Medication 100 GRAM(S): at 17:24

## 2018-08-10 RX ADMIN — Medication 100 MILLIGRAM(S): at 06:37

## 2018-08-10 RX ADMIN — AMIODARONE HYDROCHLORIDE 400 MILLIGRAM(S): 400 TABLET ORAL at 15:15

## 2018-08-10 RX ADMIN — TEMAZEPAM 15 MILLIGRAM(S): 15 CAPSULE ORAL at 23:50

## 2018-08-10 RX ADMIN — Medication 100 MILLIGRAM(S): at 15:15

## 2018-08-10 RX ADMIN — OXYCODONE HYDROCHLORIDE 10 MILLIGRAM(S): 5 TABLET ORAL at 19:23

## 2018-08-10 RX ADMIN — VASOPRESSIN 2 UNIT(S)/MIN: 20 INJECTION INTRAVENOUS at 02:49

## 2018-08-10 RX ADMIN — Medication 100 GRAM(S): at 05:18

## 2018-08-10 RX ADMIN — OXYCODONE HYDROCHLORIDE 10 MILLIGRAM(S): 5 TABLET ORAL at 19:53

## 2018-08-10 NOTE — PROGRESS NOTE ADULT - ASSESSMENT
Assessment/Plan:  s/p AVR, MAZE-POD #2  1-BP control-ACE-I as tolerated  2-serum glucose control-insulin sliding scale coverage regimen  3-A-Fib-now NSR-s/p MAZE-continue amiodarone  4-fluid overload-diuresis

## 2018-08-10 NOTE — PROGRESS NOTE ADULT - SUBJECTIVE AND OBJECTIVE BOX
OPERATIVE PROCEDURE(s):                POD #                       53yMale  SURGEON(s): KAN Boggs  SUBJECTIVE ASSESSMENT:   Vital Signs Last 24 Hrs  T(F): 99.9 (10 Aug 2018 08:00), Max: 101.1 (09 Aug 2018 20:00)  HR: 62 (10 Aug 2018 08:00) (58 - 74)  BP: 102/65 (10 Aug 2018 08:00) (87/64 - 112/72)  BP(mean): 77 (10 Aug 2018 08:00) (67 - 90)  ABP: 102/56 (09 Aug 2018 16:30) (94/50 - 140/136)  ABP(mean): 72 (09 Aug 2018 16:30)  RR: 24 (10 Aug 2018 08:00) (12 - 28)  SpO2: 96% (10 Aug 2018 08:00) (96% - 100%)  CVP(mm Hg): 14 (09 Aug 2018 11:30)  CO: 6.2 (09 Aug 2018 10:00)  CI: 2.7 (09 Aug 2018 10:00)  PA: 7/11 (09 Aug 2018 11:30)  SVR: 721 (09 Aug 2018 10:00)    I&O's Detail    09 Aug 2018 07:01  -  10 Aug 2018 07:00  --------------------------------------------------------  IN:    Albumin 5%  - 500 mL: 500 mL    insulin Infusion: 48 mL    IV PiggyBack: 100 mL    nitroglycerin  Infusion: 33 mL    Oral Fluid: 780 mL    sodium chloride 0.9%.: 480 mL    vasopressin Infusion: 10 mL  Total IN: 1951 mL    OUT:    Chest Tube: 210 mL    Indwelling Catheter - Urethral: 1400 mL  Total OUT: 1610 mL        Net:   I&O's Detail    08 Aug 2018 07:01  -  09 Aug 2018 07:00  --------------------------------------------------------  Total NET: 2012 mL      09 Aug 2018 07:01  -  10 Aug 2018 07:00  --------------------------------------------------------  Total NET: 341 mL        CAPILLARY BLOOD GLUCOSE  132 (10 Aug 2018 06:42)  108 (10 Aug 2018 04:00)  109 (10 Aug 2018 02:00)  109 (10 Aug 2018 00:00)  115 (09 Aug 2018 22:00)  131 (09 Aug 2018 20:36)  223 (09 Aug 2018 19:00)  145 (09 Aug 2018 18:00)  116 (09 Aug 2018 16:00)  140 (09 Aug 2018 15:01)  145 (09 Aug 2018 13:00)  96 (09 Aug 2018 12:00)  105 (09 Aug 2018 10:00)  115 (09 Aug 2018 08:00)  129 (09 Aug 2018 07:00)  117 (09 Aug 2018 03:00)        Physical Exam:  General: NAD; A&Ox3/Patient is intubated and sedated  Cardiac: S1/S2, RRR, no murmur, no rubs  Lungs: unlabored respirations, CTA b/l, no wheeze, no rales, no crackles  Abdomen: Soft/NT/ND; positive bowel sounds x 4  Sternum: Intact, no click, incision healing well with no drainage  Incisions: Incisions clean/dry/intact  Extremities: No edema b/l lower extremities; good capillary refill; no cyanosis; palpable 1+ pedal pulses b/l    Central Venous Catheter: Yes[]  No[] , If Yes indication:           Day #  Juarez Catheter: Yes  [] , No  [] , If yes indication:                      Day #  NGT: Yes [] No [] ,    If Yes Placement:                                     Day #  EPICARDIAL WIRES:  [] YES [] NO                                              Day #  BOWEL MOVEMENT:  [] YES [] NO, If No, Timing since last BM:      Day #  CHEST TUBE(Left/Right):  [] YES [] NO, If yes -  AIR LEAKS:  [] YES [] NO        LABS:                        11.4<L>  23.80<H> )-----------( 172      ( 10 Aug 2018 02:25 )             34.5<L>                        10.3<L>  12.13<H> )-----------( 146      ( 09 Aug 2018 02:20 )             30.6<L>    08-10    133<L>  |  98  |  31<H>  ----------------------------<  103<H>  5.0   |  21  |  1.2  08-09    141  |  107  |  18  ----------------------------<  114<H>  4.9   |  18  |  0.8    Ca    8.3<L>      10 Aug 2018 03:54  Mg     2.6     08-10    TPro  5.7<L> [6.0 - 8.0]  /  Alb  4.6 [3.5 - 5.2]  /  TBili  1.2 [0.2 - 1.2]  /  DBili  x   /  AST  83<H> [0 - 41]  /  ALT  51<H> [0 - 41]  /  AlkPhos  42 [30 - 115]  08-09    PT/INR - ( 09 Aug 2018 02:20 )   PT: ;   INR: 1.34 ratio         PT/INR - ( 08 Aug 2018 15:23 )   PT: ;   INR: 1.31 ratio         PTT - ( 09 Aug 2018 02:20 )  PTT:27.3 sec, PTT - ( 08 Aug 2018 15:23 )  PTT:28.0 sec    ABG - ( 09 Aug 2018 04:27 )  pH: 7.36  /  pCO2: 35    /  pO2: 125   / HCO3: 20    / Base Excess: -5.2  /  SaO2: 99    /  LA: 1.4              RADIOLOGY & ADDITIONAL TESTS:  CXR:  EKG:  MEDICATIONS  (STANDING):  amiodarone    Tablet 400 milliGRAM(s) Oral every 8 hours  aspirin enteric coated 325 milliGRAM(s) Oral daily  dextrose 50% Injectable 50 milliLiter(s) IV Push every 15 minutes  dextrose 50% Injectable 25 milliLiter(s) IV Push every 15 minutes  docusate sodium 100 milliGRAM(s) Oral three times a day  famotidine    Tablet 20 milliGRAM(s) Oral two times a day  insulin Infusion 1 Unit(s)/Hr (1 mL/Hr) IV Continuous <Continuous>  magnesium sulfate  IVPB 1 Gram(s) IV Intermittent every 12 hours  nitroglycerin  Infusion 5 MICROgram(s)/Min (1.5 mL/Hr) IV Continuous <Continuous>  sodium chloride 0.9%. 1000 milliLiter(s) (20 mL/Hr) IV Continuous <Continuous>  vasopressin Infusion 0.033 Unit(s)/Min (2 mL/Hr) IV Continuous <Continuous>    MEDICATIONS  (PRN):  acetaminophen   Tablet 650 milliGRAM(s) Oral every 6 hours PRN For Temp greater than 38 C (100.4 F)  oxyCODONE    IR 5 milliGRAM(s) Oral every 6 hours PRN Mild Pain (1 - 3)  oxyCODONE    IR 10 milliGRAM(s) Oral every 6 hours PRN Moderate Pain (4 - 6)  senna 1 Tablet(s) Oral every 12 hours PRN Constipation    HEPARIN:  [] YES [] NO  Dose: XX UNITS/HR UNITS Q8H  LOVENOX:[] YES [] NO  Dose: XX mg Q24H  COUMADIN: []  YES [] NO  Dose: XX mg  Q24H  SCD's: YES b/l  GI Prophylaxis: Protonix [], Pepcid [], None [], (Contra-indication:.....)    Post-Op Beta-Blockers: Yes [], No[], If No, then contraindication:  Post-Op Aspirin: Yes [],  No [], If No, then contraindication:  Post-Op Statin: Yes [], No[], If No, then contraindication:  Allergies    penicillins (Unknown)    Intolerances      Ambulation/Activity Status:    Assessment/Plan:  53y Male status-post .....  - Case and plan discussed with CTU Intensivist and CT Surgeon - Dr. Boggs/Anita/Vesna   - Continue CTU supportive care    - Continue DVT/GI prophylaxis  - Incentive Spirometry 10 times an hour  - Continue to advance physical activity as tolerated and continue PT/OT as directed  1. CAD: Continue ASA, statin, BB  2. HTN:   3. A. Fib:   4. COPD/Hypoxia:   5. DM/Glucose Control:     Social Service Disposition: OPERATIVE PROCEDURE(s):  AVR (T) MAZE w/JUJU clipping              POD #  2                     SURGEON(s): KAN Boggs  SUBJECTIVE ASSESSMENT: 53y Male patient seen and examined at bedside. Patient had brief episode of hypotension overnight which improved with fluid bolus. Currently patient c/o fatigue and became mildly dyspneic while ambulating this morning.     Vital Signs Last 24 Hrs  T(F): 99.8 (10 Aug 2018 10:00), Max: 101.1 (09 Aug 2018 20:00)  HR: 66 (10 Aug 2018 10:30) (58 - 74)  BP: 118/74 (10 Aug 2018 10:30) (87/64 - 118/74)  BP(mean): 89 (10 Aug 2018 10:30) (67 - 90)  ABP: 102/56 (09 Aug 2018 16:30) (94/50 - 140/136)  ABP(mean): 72 (09 Aug 2018 16:30)  RR: 20 (10 Aug 2018 10:30) (13 - 31)  SpO2: 98% (10 Aug 2018 10:30) (96% - 100%)      I&O's Detail    09 Aug 2018 07:01  -  10 Aug 2018 07:00  --------------------------------------------------------  IN:    Albumin 5%  - 500 mL: 500 mL    insulin Infusion: 48 mL    IV PiggyBack: 100 mL    nitroglycerin  Infusion: 33 mL    Oral Fluid: 780 mL    sodium chloride 0.9%.: 480 mL    vasopressin Infusion: 10 mL  Total IN: 1951 mL    OUT:    Chest Tube: 210 mL    Indwelling Catheter - Urethral: 1400 mL  Total OUT: 1610 mL      Net: I&O's Detail    08 Aug 2018 07:01  -  09 Aug 2018 07:00  --------------------------------------------------------  Total NET: 2012 mL      09 Aug 2018 07:01  -  10 Aug 2018 07:00  --------------------------------------------------------  Total NET: 341 mL        CAPILLARY BLOOD GLUCOSE  152 (10 Aug 2018 08:00)  132 (10 Aug 2018 06:42)  108 (10 Aug 2018 04:00)  109 (10 Aug 2018 02:00)  109 (10 Aug 2018 00:00)  115 (09 Aug 2018 22:00)  131 (09 Aug 2018 20:36)  223 (09 Aug 2018 19:00)  145 (09 Aug 2018 18:00)  116 (09 Aug 2018 16:00)  140 (09 Aug 2018 15:01)  145 (09 Aug 2018 13:00)  96 (09 Aug 2018 12:00)  105 (09 Aug 2018 10:00)  115 (09 Aug 2018 08:00)  129 (09 Aug 2018 07:00)      Physical Exam:  General: NAD; A&Ox3  Cardiac: S1/S2, RRR, no murmur, no rubs  Lungs: unlabored respirations, decreased at right base with bibasialr crackles  Abdomen: Soft/NT/ND; positive bowel sounds x 4  Sternum: Intact, no click, incision healing well with no drainage  Incisions: Incisions clean/dry/intact  Extremities: No edema b/l lower extremities; good capillary refill; no cyanosis; palpable 1+ pedal pulses b/l    Central Venous Catheter: Yes[x]  No[] , If Yes indication: IV Access            Day # 2  Juarez Catheter: Yes  [x] , No  [] , If yes indication: Monitor strict in/out      Day #  NGT: Yes [] No [x] ,    If Yes Placement:                                                   Day #  EPICARDIAL WIRES:  [x] YES [] NO                                                           Day #  BOWEL MOVEMENT:  [] YES [x] NO, If No, Timing since last BM Day #  CHEST TUBE(Left/Right):  [] YES [x] NO, If yes -  AIR LEAKS:  [] YES [] NO          LABS:                              11.4<L>  23.80<H> )-----------( 172      ( 10 Aug 2018 02:25 )                   34.5<L>                              10.3<L>  12.13<H> )-----------( 146      ( 09 Aug 2018 02:20 )                   30.6<L>    08-10  133<L>  |  98  |  31<H>  ----------------------------<  103<H>  5.0   |  21  |  1.2    08-09  141  |  107  |  18  ----------------------------<  114<H>  4.9   |  18  |  0.8    Ca    8.3<L>      10 Aug 2018 03:54  Mg     2.6     08-10    TPro  5.7<L> [6.0 - 8.0]  /  Alb  4.6 [3.5 - 5.2]  /  TBili  1.2 [0.2 - 1.2]  /  DBili  x   /  AST  83<H> [0 - 41]  /  ALT  51<H> [0 - 41]  /  AlkPhos  42 [30 - 115]  08-09    PT/INR - ( 09 Aug 2018 02:20 )   PT: ;   INR: 1.34 ratio       PT/INR - ( 08 Aug 2018 15:23 )   PT: ;   INR: 1.31 ratio       PTT - ( 09 Aug 2018 02:20 )  PTT:27.3 sec, PTT - ( 08 Aug 2018 15:23 )  PTT:28.0 sec    ABG - ( 09 Aug 2018 04:27 )  pH: 7.36  /  pCO2: 35    /  pO2: 125   / HCO3: 20    / Base Excess: -5.2  /  SaO2: 99    /  LA: 1.4        RADIOLOGY & ADDITIONAL TESTS:  CXR: < from: Xray Chest 1 View- PORTABLE-Routine (08.10.18 @ 05:45) >  Impression:  Right basilar opacity/effusion. Stable cardiomegaly  < end of copied text >      EKG: < from: 12 Lead ECG (08.10.18 @ 08:01) >  Ventricular Rate 63 BPM  Atrial Rate 63 BPM  P-R Interval 176 ms  QRS Duration 100 ms  Q-T Interval 442 ms  QTC Calculation(Bezet) 452 ms  P Axis 48 degrees  R Axis 12 degrees  T Axis 41 degrees  Diagnosis Line Normal sinus rhythm  T wave abnormality, consider anterolateral ischemia  Abnormal ECG  Confirmed by JINA BALL MD ([id number]) on 8/10/2018 10:02:20 AM  < end of copied text >      Allergies    penicillins (Unknown)    Intolerances      MEDICATIONS  (STANDING):  amiodarone    Tablet 400 milliGRAM(s) Oral every 8 hours  aspirin enteric coated 325 milliGRAM(s) Oral daily  dextrose 5%. 1000 milliLiter(s) (50 mL/Hr) IV Continuous <Continuous>  dextrose 50% Injectable 50 milliLiter(s) IV Push every 15 minutes  dextrose 50% Injectable 25 milliLiter(s) IV Push every 15 minutes  docusate sodium 100 milliGRAM(s) Oral three times a day  famotidine    Tablet 20 milliGRAM(s) Oral two times a day  insulin lispro (HumaLOG) corrective regimen sliding scale   SubCutaneous three times a day before meals  magnesium sulfate  IVPB 1 Gram(s) IV Intermittent every 12 hours  nitroglycerin  Infusion 5 MICROgram(s)/Min (1.5 mL/Hr) IV Continuous <Continuous>  sodium chloride 0.9%. 1000 milliLiter(s) (20 mL/Hr) IV Continuous <Continuous>  vasopressin Infusion 0.033 Unit(s)/Min (2 mL/Hr) IV Continuous <Continuous>    MEDICATIONS  (PRN):  acetaminophen   Tablet 650 milliGRAM(s) Oral every 6 hours PRN For Temp greater than 38 C (100.4 F)  dextrose 40% Gel 15 Gram(s) Oral once PRN Blood Glucose LESS THAN 70 milliGRAM(s)/deciliter  glucagon  Injectable 1 milliGRAM(s) IntraMuscular once PRN Glucose LESS THAN 70 milligrams/deciliter  oxyCODONE    IR 5 milliGRAM(s) Oral every 6 hours PRN Mild Pain (1 - 3)  oxyCODONE    IR 10 milliGRAM(s) Oral every 6 hours PRN Moderate Pain (4 - 6)  senna 1 Tablet(s) Oral every 12 hours PRN Constipation      Pharmacologic DVT Prophylaxis: [] YES, [x]NO: Contraindication: Surgical attending preference   [] HEPARIN: Dose: XX mg  Q24H    [] LOVENOX: Dose: XX mg  Q24H                 SCD's: YES b/l    GI Prophylaxis: famotidine    Tablet 20 milliGRAM(s) Oral two times a day    Post-Op Beta-Blockers: []Yes, [x]No: contraindication: Symptomatic hypotension and bradycardia   Post-Op Aspirin: [x]Yes,  []No: contraindication:  Post-Op Statin: []Yes, [x]No: contraindication: No HLD      Ambulation/Activity Status: OOB/AMB      Assessment/Plan:  53y Male status-post AVR (T) MAZE w/JUJU clipping POD #  2   - Case and plan discussed with CTU Intensivist and CT Surgeon - Dr. Boggs/Anita/Vesna   - Continue CTU supportive care and ongoing plan of care as per continuing CTU rounds.    - Continue DVT/GI prophylaxis  - Incentive Spirometry 10 times an hour  - Continue to advance physical activity as tolerated and continue PT/OT as directed  1. Hold BB due to symptomatic hypotension and bradycardia. Hold other oral hypertensives for SBP goal of  mmHg.  3. Paroxysmal A. Fib: amiodarone    Tablet 400 milliGRAM(s) Oral every 8 hours   4. COPD/Hypoxia: Continue nebs  5. DM/Glucose Control: continue insuline gtt   6. TTE to r/o pericardial effusion  7. Leukocytosis and KEIKO: repeat CBC/BMP at 12:00  8. Small right pleural effusion: lasix 20mg IV x1.     Social Service Disposition:  Home

## 2018-08-10 NOTE — PHYSICAL THERAPY INITIAL EVALUATION ADULT - GENERAL OBSERVATIONS, REHAB EVAL
PT educated pt on PT POC, content of IE, and rehab goals. Pt verbalized understanding.  PT to f/u as appropriate
08:40-09:15 Pt encountered sitting in b/s chair with tele, IV lock, family @ b/s, in NAD. Pt with c/o fatigue stating he did not sleep well last night. Pt agreeable to PT. BP: 106/69, SpO2 98% RA, HR: 62 bpm Pt left same as found with tele, IV lock, family & NITZA Burns @ b/s, in NAD.  BP: 110/68, SpO2: 98% RA, HR: 62bpm

## 2018-08-10 NOTE — PROGRESS NOTE ADULT - SUBJECTIVE AND OBJECTIVE BOX
JOCELYN SUNG  MRN#: [id number]  Subjective:  Patient was seen and evalauted on AM rounds c/o pain, generalized weakness    OBJECTIVE:  ICU Vital Signs Last 24 Hrs  T(C): 37.8 (10 Aug 2018 09:00), Max: 38.4 (09 Aug 2018 20:00)  T(F): 100.1 (10 Aug 2018 09:00), Max: 101.1 (09 Aug 2018 20:00)  HR: 62 (10 Aug 2018 09:15) (58 - 74)  BP: 99/65 (10 Aug 2018 09:15) (87/64 - 112/72)  BP(mean): 75 (10 Aug 2018 09:15) (67 - 90)  ABP: 102/56 (09 Aug 2018 16:30) (94/50 - 140/136)  ABP(mean): 72 (09 Aug 2018 16:30) (58 - 82)  RR: 22 (10 Aug 2018 09:15) (19 - 31)  SpO2: 98% (10 Aug 2018 09:15) (96% - 100%)      08-09 @ 07:01  -  08-10 @ 07:00  --------------------------------------------------------  IN: 1951 mL / OUT: 1610 mL / NET: 341 mL    08-10 @ 07:01  -  08-10 @ 10:33  --------------------------------------------------------  IN: 204 mL / OUT: 103 mL / NET: 101 mL      CAPILLARY BLOOD GLUCOSE  152 (10 Aug 2018 08:00)          PHYSICAL EXAM:Daily     Daily   General: WN/WD NAD    HEENT:     + NCAT  + EOMI  - Conjuctival edema   - Icterus   - Thrush   - ETT  - NGT/OGT    Neck:         + FROM    - JVD     - Nodes     - Masses    + Mid-line trachea   - Tracheostomy    Chest:         - Sternal click  - Sternal drainage  + Pacing wires  + Chest tubes  - SubQ emphysema    Lungs:          + CTA   - Rhonchi    - Rales    - Wheezing     - Decreased BS   - Dullness R L    Cardiac:       + S1 + S2    + RRR   - Irregular   - S3  - S4    - Murmurs   - Rub   - Hamman’s sign     Abdomen:    + BS     + Soft    + Non-tender     - Distended    - Organomegaly  - PEG    Extremities:   - Cyanosis U/L   - Clubbing  U/L  - LE/UE Edema   + Capillary refill    + Pulses     Neuro:        + Awake   +  Alert   - Confused   - Lethargic   - Sedated   - Generalized Weakness    Skin:        - Rashes    - Erythema   + Normal incisions   + IV sites intact  - Sacral decubitus    HOSPITAL MEDICATIONS:  MEDICATIONS  (STANDING):  amiodarone    Tablet 400 milliGRAM(s) Oral every 8 hours  aspirin enteric coated 325 milliGRAM(s) Oral daily  dextrose 5%. 1000 milliLiter(s) (50 mL/Hr) IV Continuous <Continuous>  dextrose 50% Injectable 50 milliLiter(s) IV Push every 15 minutes  dextrose 50% Injectable 25 milliLiter(s) IV Push every 15 minutes  docusate sodium 100 milliGRAM(s) Oral three times a day  famotidine    Tablet 20 milliGRAM(s) Oral two times a day  insulin lispro (HumaLOG) corrective regimen sliding scale   SubCutaneous three times a day before meals  magnesium sulfate  IVPB 1 Gram(s) IV Intermittent every 12 hours  nitroglycerin  Infusion 5 MICROgram(s)/Min (1.5 mL/Hr) IV Continuous <Continuous>  sodium chloride 0.9%. 1000 milliLiter(s) (20 mL/Hr) IV Continuous <Continuous>  vasopressin Infusion 0.033 Unit(s)/Min (2 mL/Hr) IV Continuous <Continuous>    MEDICATIONS  (PRN):  acetaminophen   Tablet 650 milliGRAM(s) Oral every 6 hours PRN For Temp greater than 38 C (100.4 F)  dextrose 40% Gel 15 Gram(s) Oral once PRN Blood Glucose LESS THAN 70 milliGRAM(s)/deciliter  glucagon  Injectable 1 milliGRAM(s) IntraMuscular once PRN Glucose LESS THAN 70 milligrams/deciliter  oxyCODONE    IR 5 milliGRAM(s) Oral every 6 hours PRN Mild Pain (1 - 3)  oxyCODONE    IR 10 milliGRAM(s) Oral every 6 hours PRN Moderate Pain (4 - 6)  senna 1 Tablet(s) Oral every 12 hours PRN Constipation      LABS:                        11.4   23.80 )-----------( 172      ( 10 Aug 2018 02:25 )             34.5    08-10    133<L>  |  98  |  31<H>  ----------------------------<  103<H>  5.0   |  21  |  1.2    Ca    8.3<L>      10 Aug 2018 03:54  Mg     2.6     08-10    TPro  5.7<L>  /  Alb  4.6  /  TBili  1.2  /  DBili  x   /  AST  83<H>  /  ALT  51<H>  /  AlkPhos  42  08-09    PT/INR - ( 09 Aug 2018 02:20 )   PT: 14.40 sec;   INR: 1.34 ratio         PTT - ( 09 Aug 2018 02:20 )  PTT:27.3 sec LIVER FUNCTIONS - ( 09 Aug 2018 02:20 )  Alb: 4.6 g/dL / Pro: 5.7 g/dL / ALK PHOS: 42 U/L / ALT: 51 U/L / AST: 83 U/L / GGT: x               RADIOLOGY:  X Reviewed and interpreted by me: right basilar opacity/effusion    CARDIOPULMONARY DYSFUNCTION  - Respiratory status required supplemental oxygen & the following of continuous pulse oximetry for support & to prevent decompensation  - Continued early mobilization as tolerated  - Addressed analgesic regimen to optimize function    PREVENTION-PROPHYLAXI  - Lovenox/Heparin initiated/continued for VTE prophylaxis in addition to Venodyne boots  - Protonix/Pepcid maintained for GI bleeding prophylaxis  - Metabolic stability & infection prophylaxis required review and adjustment of regular Insulin sliding scale and gylcemic regimen while following serial glucose levels to help achieve & maintain euglycemia  - Reviewed & addressed surgical site infection prophylaxis regimen

## 2018-08-10 NOTE — PHYSICAL THERAPY INITIAL EVALUATION ADULT - PERTINENT HX OF CURRENT PROBLEM, REHAB EVAL
Pt presented to ED, sent by physician, 2* c/o worsening SOB/OCONNOR & EKG abnormality. Patient underwent cardiac catheterization which revealed no coronary artery disease with severe Aortic stenosis.

## 2018-08-10 NOTE — PHYSICAL THERAPY INITIAL EVALUATION ADULT - GAIT DISTANCE, PT EVAL
20'x1, Pt with frequent standing rest breaks stating he is SOB & can not catch his breath., pt denies pain. NITZA Burns made aware & pt returned to room via chair. AMY Gillette also notified & assessed pt. See vitals above.

## 2018-08-11 LAB
ANION GAP SERPL CALC-SCNC: 11 MMOL/L — SIGNIFICANT CHANGE UP (ref 7–14)
BASOPHILS # BLD AUTO: 0.03 K/UL — SIGNIFICANT CHANGE UP (ref 0–0.2)
BASOPHILS NFR BLD AUTO: 0.2 % — SIGNIFICANT CHANGE UP (ref 0–1)
BUN SERPL-MCNC: 24 MG/DL — HIGH (ref 10–20)
CALCIUM SERPL-MCNC: 8.5 MG/DL — SIGNIFICANT CHANGE UP (ref 8.5–10.1)
CHLORIDE SERPL-SCNC: 98 MMOL/L — SIGNIFICANT CHANGE UP (ref 98–110)
CO2 SERPL-SCNC: 24 MMOL/L — SIGNIFICANT CHANGE UP (ref 17–32)
CREAT SERPL-MCNC: 0.7 MG/DL — SIGNIFICANT CHANGE UP (ref 0.7–1.5)
EOSINOPHIL # BLD AUTO: 0.04 K/UL — SIGNIFICANT CHANGE UP (ref 0–0.7)
EOSINOPHIL NFR BLD AUTO: 0.2 % — SIGNIFICANT CHANGE UP (ref 0–8)
GLUCOSE SERPL-MCNC: 108 MG/DL — HIGH (ref 70–99)
HCT VFR BLD CALC: 31.9 % — LOW (ref 42–52)
HGB BLD-MCNC: 10.8 G/DL — LOW (ref 14–18)
IMM GRANULOCYTES NFR BLD AUTO: 0.9 % — HIGH (ref 0.1–0.3)
LYMPHOCYTES # BLD AUTO: 12.3 % — LOW (ref 20.5–51.1)
LYMPHOCYTES # BLD AUTO: 2.4 K/UL — SIGNIFICANT CHANGE UP (ref 1.2–3.4)
MAGNESIUM SERPL-MCNC: 2.5 MG/DL — HIGH (ref 1.8–2.4)
MCHC RBC-ENTMCNC: 31.2 PG — HIGH (ref 27–31)
MCHC RBC-ENTMCNC: 33.9 G/DL — SIGNIFICANT CHANGE UP (ref 32–37)
MCV RBC AUTO: 92.2 FL — SIGNIFICANT CHANGE UP (ref 80–94)
MONOCYTES # BLD AUTO: 3.28 K/UL — HIGH (ref 0.1–0.6)
MONOCYTES NFR BLD AUTO: 16.8 % — HIGH (ref 1.7–9.3)
NEUTROPHILS # BLD AUTO: 13.61 K/UL — HIGH (ref 1.4–6.5)
NEUTROPHILS NFR BLD AUTO: 69.6 % — SIGNIFICANT CHANGE UP (ref 42.2–75.2)
NRBC # BLD: 0 /100 WBCS — SIGNIFICANT CHANGE UP (ref 0–0)
PLATELET # BLD AUTO: 191 K/UL — SIGNIFICANT CHANGE UP (ref 130–400)
POTASSIUM SERPL-MCNC: 4.8 MMOL/L — SIGNIFICANT CHANGE UP (ref 3.5–5)
POTASSIUM SERPL-SCNC: 4.8 MMOL/L — SIGNIFICANT CHANGE UP (ref 3.5–5)
RBC # BLD: 3.46 M/UL — LOW (ref 4.7–6.1)
RBC # FLD: 13.4 % — SIGNIFICANT CHANGE UP (ref 11.5–14.5)
SODIUM SERPL-SCNC: 133 MMOL/L — LOW (ref 135–146)
WBC # BLD: 19.53 K/UL — HIGH (ref 4.8–10.8)
WBC # FLD AUTO: 19.53 K/UL — HIGH (ref 4.8–10.8)

## 2018-08-11 RX ORDER — TEMAZEPAM 15 MG/1
15 CAPSULE ORAL ONCE
Qty: 0 | Refills: 0 | Status: DISCONTINUED | OUTPATIENT
Start: 2018-08-11 | End: 2018-08-11

## 2018-08-11 RX ORDER — AMIODARONE HYDROCHLORIDE 400 MG/1
200 TABLET ORAL EVERY 12 HOURS
Qty: 0 | Refills: 0 | Status: DISCONTINUED | OUTPATIENT
Start: 2018-08-11 | End: 2018-08-13

## 2018-08-11 RX ORDER — FUROSEMIDE 40 MG
20 TABLET ORAL ONCE
Qty: 0 | Refills: 0 | Status: COMPLETED | OUTPATIENT
Start: 2018-08-11 | End: 2018-08-11

## 2018-08-11 RX ADMIN — Medication 100 GRAM(S): at 06:19

## 2018-08-11 RX ADMIN — Medication 100 MILLIGRAM(S): at 22:28

## 2018-08-11 RX ADMIN — FAMOTIDINE 20 MILLIGRAM(S): 10 INJECTION INTRAVENOUS at 06:20

## 2018-08-11 RX ADMIN — OXYCODONE HYDROCHLORIDE 5 MILLIGRAM(S): 5 TABLET ORAL at 18:40

## 2018-08-11 RX ADMIN — TEMAZEPAM 15 MILLIGRAM(S): 15 CAPSULE ORAL at 22:29

## 2018-08-11 RX ADMIN — AMIODARONE HYDROCHLORIDE 200 MILLIGRAM(S): 400 TABLET ORAL at 17:47

## 2018-08-11 RX ADMIN — Medication 325 MILLIGRAM(S): at 12:02

## 2018-08-11 RX ADMIN — Medication 100 MILLIGRAM(S): at 06:19

## 2018-08-11 RX ADMIN — OXYCODONE HYDROCHLORIDE 5 MILLIGRAM(S): 5 TABLET ORAL at 12:02

## 2018-08-11 RX ADMIN — OXYCODONE HYDROCHLORIDE 5 MILLIGRAM(S): 5 TABLET ORAL at 11:32

## 2018-08-11 RX ADMIN — Medication 100 MILLIGRAM(S): at 15:17

## 2018-08-11 RX ADMIN — OXYCODONE HYDROCHLORIDE 5 MILLIGRAM(S): 5 TABLET ORAL at 19:10

## 2018-08-11 RX ADMIN — Medication 100 GRAM(S): at 17:43

## 2018-08-11 RX ADMIN — FAMOTIDINE 20 MILLIGRAM(S): 10 INJECTION INTRAVENOUS at 17:47

## 2018-08-11 RX ADMIN — Medication 20 MILLIGRAM(S): at 10:25

## 2018-08-11 NOTE — PROGRESS NOTE ADULT - ATTENDING COMMENTS
POD 3 after AVR/MAZE/JUJU closure    doing well  cont ASA  SCDs for DVT ppx and stockings  amio loaded, switched to PO maintenance  cont mild Diuresis  Cont pulmonary toilet, Chest PT, pain control, Incentive spirometry  OOB ambulate  case d/w CTU team  dispo in progress: inhouse rehab vs going home (to be decided today) POD 3 after AVR/MAZE/JUJU closure    doing well  cont ASA  SCDs for DVT ppx and stockings  amio loaded, switched to PO maintenance  cont mild Diuresis  Cont pulmonary toilet, Chest PT, pain control, Incentive spirometry  OOB ambulate  case d/w CTU team

## 2018-08-11 NOTE — CHART NOTE - NSCHARTNOTEFT_GEN_A_CORE
Registered Dietitian Follow-Up       Patient Profile Reviewed                           Yes [x]   No []       Nutrition History Previously Obtained        Yes [x]  No []         Pertinent Subjective Information:       Pertinent Medical Interventions: Presented with shortness of breath for the last few days to week with exertion, as well as intermittent mild chest discomfort also with exertion, which patient initially attributed to gas pain. When he went to cardio office today he was sent to the ED due to his symptoms and ECG that showed V4-V6 TWI and elevation in AVR. In ED STEMI code was called but then cancelled when prior ECGs were seen to be similar and was determined to be LVH with S-T repolarization abnormalities. Of note, patient reports he has had a negative stress test in the past as well. The patient was intubated. POD #3 s/p TAVR, MAZE. The patient was extubated. MAP-82.       Diet order: (8/10) Regular       Anthropometrics:  - Ht: 6'2"  - Wt: 99.2kg  - %wt change  - BMI: 28 (Overweight)  - IBW: 86kg       Pertinent Lab Data: () Na-133, K-4.8, CL-98, BUN-24, Cr-0.7, Glucose-108mg/dL, Ca-8.5, H/H-10.8/31.9, WBC-19.53       Pertinent Meds: Admiodarone, Pepcid, Colace, Magnesium sulfate, Senna       Physical Findings:  - Appearance: Awake; appears well nourished with no physical signs of muscle and subcutaneous fat wasting.  - GI function: The patient reports having a soft bowel movement ().  - Tubes: N/A  - Oral/Mouth cavity: No chewing and swallowing difficulty  - Skin: Intact (Azam Score-21)       Nutrition Requirements  Weight Used: 99.2kg (Current weight)       Estimated Energy Needs    Continue [x]  Adjust []  Adjusted Energy Recommendations: 3205-5738 kcal/day (MSJ x 1.1-1.2)        Estimated Protein Needs    Continue [x]  Adjust []  Adjusted Protein Recommendations: 99-109g/day (1.0-1.1 g/kg of ABW)        Estimated Fluid Needs        Continue [x]  Adjust []  Adjusted Fluid Recommendations: Fluids per CTU team       Nutrient Intake: The patient consumes 30%-40% of meals; agreed to receive an oral supplement        [] Previous Nutrition Diagnosis: Inadequate protein-energy intake              [x] Ongoing          [] Resolved    [] No active nutrition diagnosis identified at this time       Nutrition Diagnostic #1  Problem:  Etiology:  Statement:     Nutrition Diagnostic #2  Problem:  Etiology:  Statement:     Nutrition Intervention:  1.Meals and Snacks  2.Medical Food Supplement       Goal/Expected Outcome:  1.Consume 50%-75% of meals in 3 days      Indicator/Monitorin.Monitor diet order, calorie intake, nutrition focused physical findings, lytes, anemia profile      Recommendations:  1.Recommend continue to provide a Regular diet  2.Recommend provide Chocolate Ensure Enlive Q8hrs

## 2018-08-11 NOTE — PROGRESS NOTE ADULT - SUBJECTIVE AND OBJECTIVE BOX
CTU Attending Progress Daily Note     11 Aug 2018 12:07  POD#    3           Procedure:  AVR MAZE    Patient seen as post-op critical care follow-up    HPI:  This is a 54 YO M with PMH AFib s/p cardioversion in the ED in May and ablation years ago, rheumatic fever as a child, and valve disease (patient unaware of the specifics, but has been told by a physician his valve was "calcified"), who presents with shortness of breath for the last few days to week with exertion, as well as intermittent mild chest discomfort also with exertion, which patient initially attributed to gas pain. He underwent bedside cardioversion in the ED May 11th and was DC'd home to follow up with his cardiologist, but was not given any medications since his CHADS-Vasc was 0. When he went to cardio office today he was sent to the ED due to his symptoms and ECG that showed V4-V6 TWI and elevation in AVR. In ED STEMI code was called but then cancelled when prior ECGs were seen to be similar and was determined to be LVH with S-T repolarization abnormalities. Of note, patient reports he has had a negative stress test in the past as well.   Currently denies any symptoms, resting comfortably in bed.    ICU Vital Signs Last 24 Hrs  T(C): 36.7 (01 Aug 2018 18:04), Max: 36.7 (01 Aug 2018 18:04)  T(F): 98 (01 Aug 2018 18:04), Max: 98 (01 Aug 2018 18:04)  HR: 85 (01 Aug 2018 18:04) (84 - 85)  BP: 113/73 (01 Aug 2018 18:04) (113/73 - 113/75)  BP(mean): --  ABP: --  ABP(mean): --  RR: 19 (01 Aug 2018 18:04) (18 - 19)  SpO2: 100% (01 Aug 2018 18:04) (99% - 100%) (01 Aug 2018 20:16)      Interval event for past 24 hr:  JOCELYN SUNG  53y had no event.     Current Complains:  ANA LILIAJOCELYN has no new complaints    REVIEW OF SYSTEMS:  CONSTITUTIONAL:  [-] weakness, [-] fevers, [-] chills  EYES/ENT: [-] visual changes, [-] vertigo, [-] throat pain   NECK: [-] pain, [-] stiffness  RESPIRATORY: [-] cough, [-] wheezing, [-] hemoptysis, [-] shortness of breath  CARDIOVASCULAR: [-] chest pain, [-] palpitations, [-] orthopnea  GASTROINTESTINAL:    [-]abdominal pain, [-] nausea, [-] vomiting, [-] hematemesis, [-] diarrhea, [-] constipation, [-] melena, [-] hematochezia.  GENITOURINARY: [-] dysuria, [-] frequency, [-] hematuria  NEUROLOGICAL: [-] numbness, [-] weakness  SKIN: [-] itching, [-] burning, [-] rashes, [-] lesions   All other review of systems is negative unless indicated above.    [  ] Unable to assess ROS because :    OBJECTIVE:  ICU Vital Signs Last 24 Hrs  T(C): 36.8 (11 Aug 2018 04:00), Max: 37.8 (10 Aug 2018 13:00)  T(F): 98.2 (11 Aug 2018 04:00), Max: 100 (10 Aug 2018 13:00)  HR: 66 (11 Aug 2018 07:52) (62 - 70)  BP: 119/80 (11 Aug 2018 07:52) (106/69 - 119/80)  BP(mean): 93 (11 Aug 2018 07:52) (81 - 93)  ABP: --  ABP(mean): --  RR: 19 (11 Aug 2018 06:00) (13 - 23)  SpO2: 98% (11 Aug 2018 07:52) (97% - 99%)      I&O's Summary    10 Aug 2018 07:01  -  11 Aug 2018 07:00  --------------------------------------------------------  IN: 1364 mL / OUT: 1802 mL / NET: -438 mL    11 Aug 2018 07:01  -  11 Aug 2018 12:08  --------------------------------------------------------  IN: 720 mL / OUT: 600 mL / NET: 120 mL      Adult Advanced Hemodynamics Last 24 Hrs  CVP(mm Hg): --  CVP(cm H2O): --  CO: --  CI: --  PA: --  PA(mean): --  PCWP: --  SVR: --  SVRI: --  PVR: --  PVRI: --      PHYSICAL EXAM:  General: WN/WD NAD    HEENT:     [+] NCAT  [+] EOMI  [-] Conjuctival edema   [-] Icterus   [-] Thrush   [-] ETT  [-] NGT/OGT    Neck:         [+] FROM   [-] JVD     [-] Nodes     [-] Masses    [+] Mid-line trachea    [-] Tracheostomy    Chest:         [-] Sternal click   [-] Sternal drainage   [+] Pacing wires   [-] Chest tubes   [-] SubQ emphysema    Lungs:          [+] CTA   [-] Rhonchi   [-] Rales    [-] Wheezing    [-] Decreased BS    [-] Dullness R L    Cardiac:       [+] S1 [+] S2    [+] RRR   [-] Irregular   [-] S3   [-] S4    [-] Murmurs    [-] Rub    Abdomen:    [+] BS    [+] Soft    [+] Non-tender     [-] Distended    [-] Organomegaly  [-] PEG    Extremities:   [-] Cyanosis U/L   [-] Clubbing  U/L  [-] LE/UE Edema   [+] Capillary refill    [+] Pulses     Neuro:        [+] Awake   [+]  Alert   [-] Confused   [-] Lethargic   [-] Sedated   [-] Generalized Weakness    Skin:        [-] Rashes    [-] Erythema   [+] Normal incisions   [+] IV sites intact   [-] Sacral decubitus      CAPILLARY BLOOD GLUCOSE  132 (11 Aug 2018 11:26)        CAPILLARY BLOOD GLUCOSE  132 (11 Aug 2018 11:26)  113 (11 Aug 2018 06:00)  146 (10 Aug 2018 22:00)  135 (10 Aug 2018 16:00)          HOSPITAL MEDICATIONS:  MEDICATIONS  (STANDING):  amiodarone    Tablet 200 milliGRAM(s) Oral every 12 hours  aspirin enteric coated 325 milliGRAM(s) Oral daily  dextrose 5%. 1000 milliLiter(s) (50 mL/Hr) IV Continuous <Continuous>  dextrose 50% Injectable 50 milliLiter(s) IV Push every 15 minutes  dextrose 50% Injectable 25 milliLiter(s) IV Push every 15 minutes  docusate sodium 100 milliGRAM(s) Oral three times a day  famotidine    Tablet 20 milliGRAM(s) Oral two times a day  insulin lispro (HumaLOG) corrective regimen sliding scale   SubCutaneous three times a day before meals  magnesium sulfate  IVPB 1 Gram(s) IV Intermittent every 12 hours  sodium chloride 0.9%. 1000 milliLiter(s) (20 mL/Hr) IV Continuous <Continuous>    MEDICATIONS  (PRN):  acetaminophen   Tablet 650 milliGRAM(s) Oral every 6 hours PRN For Temp greater than 38 C (100.4 F)  dextrose 40% Gel 15 Gram(s) Oral once PRN Blood Glucose LESS THAN 70 milliGRAM(s)/deciliter  glucagon  Injectable 1 milliGRAM(s) IntraMuscular once PRN Glucose LESS THAN 70 milligrams/deciliter  oxyCODONE    IR 5 milliGRAM(s) Oral every 6 hours PRN Mild Pain (1 - 3)  oxyCODONE    IR 10 milliGRAM(s) Oral every 6 hours PRN Moderate Pain (4 - 6)  senna 1 Tablet(s) Oral every 12 hours PRN Constipation      LABS:                          10.8   19.53 )-----------( 191      ( 11 Aug 2018 02:38 )             31.9     08-11    133<L>  |  98  |  24<H>  ----------------------------<  108<H>  4.8   |  24  |  0.7    Ca    8.5      11 Aug 2018 02:38  Mg     2.5     08-11    TPro  5.7<L>  /  Alb  4.3  /  TBili  1.2  /  DBili  0.3<H>  /  AST  48<H>  /  ALT  42<H>  /  AlkPhos  49  08-10            RADIOLOGY:  Reviewed and interpreted by me  CXR from 08-11-18 shows [+] mild congestion, [-] pneumothorax, [-] R/L effusion, [-] cardiomegaly,       ECG:  Reviewed and interpreted by me:   QTC: WNL    Assessment:  SP AVR   Afib SP MAZE    PAST MEDICAL & SURGICAL HISTORY:  Rheumatic fever   Afib  H/O right knee surgery  H/O left knee surgery  H/O prior ablation treatment  History of cardioversion        PLAN:  Neuro: Pain control  Pulm: Encourage coughing, deep breathing and use of incentive spirometry. Wean off supplemental oxygen as able. Daily CXR.   Cardio: Monitor telemetry/alarms. Continue cardiac meds. Amio 200 PO Q 24  GI: Tolerating diet. Continue stool softeners. Continue GI prophylaxis  Renal: monitor urine output, supplement electrolytes as needed  Vasc: Heparin SC/SCDs for DVT prophylaxis  Heme: Monitor H/H.   ID: Off antibiotics. Stable.  Endocrine: Monitor finger stick blood sugar  Physical Therapy: OOB/ambulate        Discussed with Cardiothoracic Team at AM rounds.

## 2018-08-11 NOTE — PROGRESS NOTE ADULT - SUBJECTIVE AND OBJECTIVE BOX
OPERATIVE PROCEDURE(s): AVR/MAZE/JUJU               POD #3    SURGEON(s):    SUBJECTIVE ASSESSMENT:  pt has no complaints    Vital Signs Last 24 Hrs  T(C): 36.8 (11 Aug 2018 04:00), Max: 37.8 (10 Aug 2018 11:15)  T(F): 98.2 (11 Aug 2018 04:00), Max: 100 (10 Aug 2018 11:15)  HR: 66 (11 Aug 2018 07:52) (62 - 70)  BP: 119/80 (11 Aug 2018 07:52) (97/63 - 119/80)  BP(mean): 93 (11 Aug 2018 07:52) (72 - 93)  RR: 19 (11 Aug 2018 06:00) (13 - 27)  SpO2: 98% (11 Aug 2018 07:52) (96% - 99%)  08-10-18 @ 07:01  -  08-11-18 @ 07:00  --------------------------------------------------------  IN: 1364 mL / OUT: 1802 mL / NET: -438 mL    A&OX3 in NAD  CTA bilat  sternum stable, no drainage  nl s1, s2  abd soft/NT/ND  no peripheral edema    LABS:                        10.8   19.53 )-----------( 191      ( 11 Aug 2018 02:38 )             31.9     08-11    133<L>  |  98  |  24<H>  ----------------------------<  108<H>  4.8   |  24  |  0.7    Ca    8.5      11 Aug 2018 02:38  Mg     2.5     08-11    TPro  5.7<L>  /  Alb  4.3  /  TBili  1.2  /  DBili  0.3<H>  /  AST  48<H>  /  ALT  42<H>  /  AlkPhos  49  08-10    MEDICATIONS  (STANDING):  amiodarone    Tablet 400 milliGRAM(s) Oral every 8 hours  aspirin enteric coated 325 milliGRAM(s) Oral daily  docusate sodium 100 milliGRAM(s) Oral three times a day  famotidine    Tablet 20 milliGRAM(s) Oral two times a day  insulin lispro (HumaLOG) corrective regimen sliding scale   SubCutaneous three times a day before meals  magnesium sulfate  IVPB 1 Gram(s) IV Intermittent every 12 hours  sodium chloride 0.9%. 1000 milliLiter(s) (20 mL/Hr) IV Continuous <Continuous>    MEDICATIONS  (PRN):  acetaminophen   Tablet 650 milliGRAM(s) Oral every 6 hours PRN For Temp greater than 38 C (100.4 F)  dextrose 40% Gel 15 Gram(s) Oral once PRN Blood Glucose LESS THAN 70 milliGRAM(s)/deciliter  glucagon  Injectable 1 milliGRAM(s) IntraMuscular once PRN Glucose LESS THAN 70 milligrams/deciliter  oxyCODONE    IR 5 milliGRAM(s) Oral every 6 hours PRN Mild Pain (1 - 3)  oxyCODONE    IR 10 milliGRAM(s) Oral every 6 hours PRN Moderate Pain (4 - 6)  senna 1 Tablet(s) Oral every 12 hours PRN Constipation

## 2018-08-11 NOTE — PROGRESS NOTE ADULT - SUBJECTIVE AND OBJECTIVE BOX
OPERATIVE PROCEDURE(s):                POD #                       53yMale  SURGEON(s): KAN Boggs  SUBJECTIVE ASSESSMENT:  Patient has no complaints at this time.    Vital Signs Last 24 Hrs  T(F): 98.2 (11 Aug 2018 04:00), Max: 100 (10 Aug 2018 11:15)  HR: 66 (11 Aug 2018 07:52) (62 - 70) SR  BP: 119/80 (11 Aug 2018 07:52) (97/63 - 119/80)  BP(mean): 93 (11 Aug 2018 07:52) (72 - 93)  ABP: --  ABP(mean): --  RR: 19 (11 Aug 2018 06:00) (13 - 27)  SpO2: 98% (11 Aug 2018 07:52) (96% - 99%) RA  CVP(mm Hg): --  CVP(cm H2O): --  CO: --  CI: --  PA: --  SVR: --    I&O's Detail    10 Aug 2018 07:01  -  11 Aug 2018 07:00  --------------------------------------------------------  IN:    insulin Infusion: 2 mL    IV PiggyBack: 200 mL    Oral Fluid: 1140 mL    sodium chloride 0.9%.: 20 mL    vasopressin Infusion: 2 mL  Total IN: 1364 mL    OUT:    Indwelling Catheter - Urethral: 1077 mL    Voided: 725 mL  Total OUT: 1802 mL        Net:   I&O's Detail    09 Aug 2018 07:01  -  10 Aug 2018 07:00  --------------------------------------------------------  Total NET: 341 mL      10 Aug 2018 07:01  -  11 Aug 2018 07:00  --------------------------------------------------------  Total NET: -438 mL        CAPILLARY BLOOD GLUCOSE  113 (11 Aug 2018 06:00)  146 (10 Aug 2018 22:00)  135 (10 Aug 2018 16:00)  122 (10 Aug 2018 11:15)  152 (10 Aug 2018 08:00)  132 (10 Aug 2018 06:42)  108 (10 Aug 2018 04:00)        Physical Exam:  General: NAD; A&Ox3/Patient is intubated and sedated  Cardiac: S1/S2, RRR, no murmur, no rubs  Lungs: unlabored respirations, CTA b/l, no wheeze, no rales, no crackles  Abdomen: Soft/NT/ND; positive bowel sounds x 4  Sternum: Intact, no click, incision healing well with no drainage  Incisions: Incisions clean/dry/intact  Extremities: No edema b/l lower extremities; good capillary refill; no cyanosis; palpable 1+ pedal pulses b/l    Central Venous Catheter: Yes[]  No[] , If Yes indication:           Day #  Juarez Catheter: Yes  [] , No  [] , If yes indication:                      Day #  NGT: Yes [] No [] ,    If Yes Placement:                                     Day #  EPICARDIAL WIRES:  [] YES [] NO                                              Day #  BOWEL MOVEMENT:  [] YES [] NO, If No, Timing since last BM:      Day #  CHEST TUBE(Left/Right):  [] YES [] NO, If yes -  AIR LEAKS:  [] YES [] NO        LABS:                        10.8<L>  19.53<H> )-----------( 191      ( 11 Aug 2018 02:38 )             31.9<L>                        11.3<L>  21.36<H> )-----------( 185      ( 10 Aug 2018 11:22 )             33.8<L>    08-11    133<L>  |  98  |  24<H>  ----------------------------<  108<H>  4.8   |  24  |  0.7  08-10    131<L>  |  96<L>  |  30<H>  ----------------------------<  133<H>  4.8   |  19  |  0.9    Ca    8.5      11 Aug 2018 02:38  Mg     2.5     08-11    TPro  5.7<L> [6.0 - 8.0]  /  Alb  4.3 [3.5 - 5.2]  /  TBili  1.2 [0.2 - 1.2]  /  DBili  0.3<H> [0.0 - 0.2]  /  AST  48<H> [0 - 41]  /  ALT  42<H> [0 - 41]  /  AlkPhos  49 [30 - 115]  08-10          RADIOLOGY & ADDITIONAL TESTS:  CXR:   EKG:  MEDICATIONS  (STANDING):  aspirin enteric coated 325 milliGRAM(s) Oral daily  dextrose 5%. 1000 milliLiter(s) (50 mL/Hr) IV Continuous <Continuous>  dextrose 50% Injectable 50 milliLiter(s) IV Push every 15 minutes  dextrose 50% Injectable 25 milliLiter(s) IV Push every 15 minutes  docusate sodium 100 milliGRAM(s) Oral three times a day  famotidine    Tablet 20 milliGRAM(s) Oral two times a day  insulin lispro (HumaLOG) corrective regimen sliding scale   SubCutaneous three times a day before meals  magnesium sulfate  IVPB 1 Gram(s) IV Intermittent every 12 hours  sodium chloride 0.9%. 1000 milliLiter(s) (20 mL/Hr) IV Continuous <Continuous>    MEDICATIONS  (PRN):  acetaminophen   Tablet 650 milliGRAM(s) Oral every 6 hours PRN For Temp greater than 38 C (100.4 F)  dextrose 40% Gel 15 Gram(s) Oral once PRN Blood Glucose LESS THAN 70 milliGRAM(s)/deciliter  glucagon  Injectable 1 milliGRAM(s) IntraMuscular once PRN Glucose LESS THAN 70 milligrams/deciliter  oxyCODONE    IR 5 milliGRAM(s) Oral every 6 hours PRN Mild Pain (1 - 3)  oxyCODONE    IR 10 milliGRAM(s) Oral every 6 hours PRN Moderate Pain (4 - 6)  senna 1 Tablet(s) Oral every 12 hours PRN Constipation    HEPARIN:  [] YES [] NO  Dose: XX UNITS/HR UNITS Q8H  LOVENOX:[] YES [] NO  Dose: XX mg Q24H  COUMADIN: []  YES [] NO  Dose: XX mg  Q24H  SCD's: YES b/l  GI Prophylaxis: Protonix [], Pepcid [], None [], (Contra-indication:.....)    Post-Op Aspirin: Yes [],  No [], If No, then contra-indication:  Post-Op Statin: Yes [], No[], If No, then contra-indication:  Post-Op Beta-Blockers: Yes [], No [], If No, then contra-indication:    Allergies:  penicillins (Unknown)      Ambulation/Activity Status: Ambulates several times daily without assistance.    Assessment/Plan:  53y Male status-post .....  - Case and plan discussed with CTU Intensivist and CT Surgeon - Dr. Boggs/Anita/Vesna   - Continue CTU supportive care    - Continue DVT/GI prophylaxis  - Incentive Spirometry 10 times an hour  - Continue to advance physical activity as tolerated and continue PT/OT as directed  1. CAD: Continue ASA, statin, BB  2. HTN:   3. A. Fib:   4. COPD/Hypoxia:   5. DM/Glucose Control:     Social Service Disposition: OPERATIVE PROCEDURE(s):   AVR (T)             POD #   3                    53yMale  SURGEON(s): KAN Boggs  SUBJECTIVE ASSESSMENT:  Patient has no complaints at this time.    Vital Signs Last 24 Hrs  T(F): 98.2 (11 Aug 2018 04:00), Max: 100 (10 Aug 2018 11:15)  HR: 66 (11 Aug 2018 07:52) (62 - 70) SR  BP: 119/80 (11 Aug 2018 07:52) (97/63 - 119/80)  BP(mean): 93 (11 Aug 2018 07:52) (72 - 93)  RR: 19 (11 Aug 2018 06:00) (13 - 27)  SpO2: 98% (11 Aug 2018 07:52) (96% - 99%) RA      I&O's Detail    10 Aug 2018 07:01  -  11 Aug 2018 07:00  --------------------------------------------------------  IN:    insulin Infusion: 2 mL    IV PiggyBack: 200 mL    Oral Fluid: 1140 mL    sodium chloride 0.9%.: 20 mL    vasopressin Infusion: 2 mL  Total IN: 1364 mL    OUT:    Indwelling Catheter - Urethral: 1077 mL    Voided: 725 mL  Total OUT: 1802 mL        Net:   I&O's Detail    09 Aug 2018 07:01  -  10 Aug 2018 07:00  --------------------------------------------------------  Total NET: 341 mL      10 Aug 2018 07:01  -  11 Aug 2018 07:00  --------------------------------------------------------  Total NET: -438 mL        CAPILLARY BLOOD GLUCOSE  113 (11 Aug 2018 06:00)  146 (10 Aug 2018 22:00)  135 (10 Aug 2018 16:00)  122 (10 Aug 2018 11:15)  152 (10 Aug 2018 08:00)  132 (10 Aug 2018 06:42)  108 (10 Aug 2018 04:00)        Physical Exam:  General: NAD; A&Ox3  Cardiac: S1/S2, RRR, no murmur, no rubs  Lungs: unlabored respirations, CTA b/l, no wheeze, no rales, no crackles  Abdomen: Soft/NT/ND; positive bowel sounds x 4  Sternum: Intact, no click, incision healing well with no drainage  Incisions: Incisions clean/dry/intact  Extremities: No edema b/l lower extremities; good capillary refill; no cyanosis; palpable 1+ pedal pulses b/l    Central Venous Catheter: Yes[x]  No[] , If Yes indication:   unstable        Day #3  Juarez Catheter: Yes  [] , No  [x] , If yes indication:                      Day #  NGT: Yes [] No [x] ,    If Yes Placement:                                     Day #  EPICARDIAL WIRES:  [x] YES [] NO                                              Day #3  BOWEL MOVEMENT:  [x] YES [] NO, If No, Timing since last BM:      Day #  CHEST TUBE(Left/Right):  [] YES [x] NO, If yes -  AIR LEAKS:  [] YES [] NO        LABS:                        10.8<L>  19.53<H> )-----------( 191      ( 11 Aug 2018 02:38 )             31.9<L>                        11.3<L>  21.36<H> )-----------( 185      ( 10 Aug 2018 11:22 )             33.8<L>    08-11    133<L>  |  98  |  24<H>  ----------------------------<  108<H>  4.8   |  24  |  0.7  08-10    131<L>  |  96<L>  |  30<H>  ----------------------------<  133<H>  4.8   |  19  |  0.9    Ca    8.5      11 Aug 2018 02:38  Mg     2.5     08-11    TPro  5.7<L> [6.0 - 8.0]  /  Alb  4.3 [3.5 - 5.2]  /  TBili  1.2 [0.2 - 1.2]  /  DBili  0.3<H> [0.0 - 0.2]  /  AST  48<H> [0 - 41]  /  ALT  42<H> [0 - 41]  /  AlkPhos  49 [30 - 115]  08-10          RADIOLOGY & ADDITIONAL TESTS:  CXR:  < from: Xray Chest 1 View- PORTABLE-Routine (08.10.18 @ 05:45) >  Impression:      Right basilar opacity/effusion.      Stable cardiomegaly    < end of copied text >    EKG: < from: 12 Lead ECG (08.10.18 @ 08:01) >  Ventricular Rate 63 BPM    Atrial Rate 63 BPM    P-R Interval 176 ms    QRS Duration 100 ms    Q-T Interval 442 ms    QTC Calculation(Bezet) 452 ms    P Axis 48 degrees    R Axis 12 degrees    T Axis 41 degrees    Diagnosis Line Normal sinus rhythm  T wave abnormality, consider anterolateral ischemia  Abnormal ECG    Confirmed by JIAN BALL MD ([id number]) on 8/10/2018 10:02:20 AM    < end of copied text >    MEDICATIONS  (STANDING):  aspirin enteric coated 325 milliGRAM(s) Oral daily  dextrose 5%. 1000 milliLiter(s) (50 mL/Hr) IV Continuous <Continuous>  dextrose 50% Injectable 50 milliLiter(s) IV Push every 15 minutes  dextrose 50% Injectable 25 milliLiter(s) IV Push every 15 minutes  docusate sodium 100 milliGRAM(s) Oral three times a day  famotidine    Tablet 20 milliGRAM(s) Oral two times a day  insulin lispro (HumaLOG) corrective regimen sliding scale   SubCutaneous three times a day before meals  magnesium sulfate  IVPB 1 Gram(s) IV Intermittent every 12 hours  sodium chloride 0.9%. 1000 milliLiter(s) (20 mL/Hr) IV Continuous <Continuous>    MEDICATIONS  (PRN):  acetaminophen   Tablet 650 milliGRAM(s) Oral every 6 hours PRN For Temp greater than 38 C (100.4 F)  dextrose 40% Gel 15 Gram(s) Oral once PRN Blood Glucose LESS THAN 70 milliGRAM(s)/deciliter  glucagon  Injectable 1 milliGRAM(s) IntraMuscular once PRN Glucose LESS THAN 70 milligrams/deciliter  oxyCODONE    IR 5 milliGRAM(s) Oral every 6 hours PRN Mild Pain (1 - 3)  oxyCODONE    IR 10 milliGRAM(s) Oral every 6 hours PRN Moderate Pain (4 - 6)  senna 1 Tablet(s) Oral every 12 hours PRN Constipation    HEPARIN:  [] YES [x] NO  Dose: XX UNITS/HR UNITS Q8H  LOVENOX:[] YES [x] NO  Dose: XX mg Q24H  COUMADIN: []  YES [x] NO  Dose: XX mg  Q24H  No anticoagulation as per Dr. Boggs due to high risk for bleeding  SCD's: YES b/l  GI Prophylaxis: Protonix [], Pepcid [x], None [], (Contra-indication:.....)    Post-Op Aspirin: Yes [x],  No [], If No, then contra-indication:  Post-Op Beta-Blockers: Yes [], No [x], If No, then contra-indication: contra-indicated due to symptomatic hypotension     Allergies:  penicillins (Unknown)      Ambulation/Activity Status: Ambulates several times daily without assistance.    Assessment/Plan:  53y Male status-post AVT/MAZE  - Case and plan discussed with CTU Intensivist and CT Surgeon - Dr. Boggs/Anita/Vesna   - Continue CTU supportive care    - Continue DVT/GI prophylaxis  - Incentive Spirometry 10 times an hour  - Continue to advance physical activity as tolerated and continue PT/OT as directed  1. change amio gtt to PO  2. No anti-coagulation at this time  3. TTE monday

## 2018-08-12 LAB
ANION GAP SERPL CALC-SCNC: 13 MMOL/L — SIGNIFICANT CHANGE UP (ref 7–14)
BASOPHILS # BLD AUTO: 0.03 K/UL — SIGNIFICANT CHANGE UP (ref 0–0.2)
BASOPHILS NFR BLD AUTO: 0.2 % — SIGNIFICANT CHANGE UP (ref 0–1)
BUN SERPL-MCNC: 16 MG/DL — SIGNIFICANT CHANGE UP (ref 10–20)
CALCIUM SERPL-MCNC: 8.4 MG/DL — LOW (ref 8.5–10.1)
CHLORIDE SERPL-SCNC: 98 MMOL/L — SIGNIFICANT CHANGE UP (ref 98–110)
CO2 SERPL-SCNC: 24 MMOL/L — SIGNIFICANT CHANGE UP (ref 17–32)
CREAT SERPL-MCNC: 0.6 MG/DL — LOW (ref 0.7–1.5)
EOSINOPHIL # BLD AUTO: 0.07 K/UL — SIGNIFICANT CHANGE UP (ref 0–0.7)
EOSINOPHIL NFR BLD AUTO: 0.5 % — SIGNIFICANT CHANGE UP (ref 0–8)
GLUCOSE SERPL-MCNC: 105 MG/DL — HIGH (ref 70–99)
HCT VFR BLD CALC: 30.4 % — LOW (ref 42–52)
HGB BLD-MCNC: 10.4 G/DL — LOW (ref 14–18)
IMM GRANULOCYTES NFR BLD AUTO: 0.7 % — HIGH (ref 0.1–0.3)
INR BLD: 1.31 RATIO — HIGH (ref 0.65–1.3)
LYMPHOCYTES # BLD AUTO: 1.84 K/UL — SIGNIFICANT CHANGE UP (ref 1.2–3.4)
LYMPHOCYTES # BLD AUTO: 12.5 % — LOW (ref 20.5–51.1)
MAGNESIUM SERPL-MCNC: 2.1 MG/DL — SIGNIFICANT CHANGE UP (ref 1.8–2.4)
MCHC RBC-ENTMCNC: 31.8 PG — HIGH (ref 27–31)
MCHC RBC-ENTMCNC: 34.2 G/DL — SIGNIFICANT CHANGE UP (ref 32–37)
MCV RBC AUTO: 93 FL — SIGNIFICANT CHANGE UP (ref 80–94)
MONOCYTES # BLD AUTO: 2.34 K/UL — HIGH (ref 0.1–0.6)
MONOCYTES NFR BLD AUTO: 15.9 % — HIGH (ref 1.7–9.3)
NEUTROPHILS # BLD AUTO: 10.31 K/UL — HIGH (ref 1.4–6.5)
NEUTROPHILS NFR BLD AUTO: 70.2 % — SIGNIFICANT CHANGE UP (ref 42.2–75.2)
NRBC # BLD: 0 /100 WBCS — SIGNIFICANT CHANGE UP (ref 0–0)
PLATELET # BLD AUTO: 221 K/UL — SIGNIFICANT CHANGE UP (ref 130–400)
POTASSIUM SERPL-MCNC: 4.1 MMOL/L — SIGNIFICANT CHANGE UP (ref 3.5–5)
POTASSIUM SERPL-SCNC: 4.1 MMOL/L — SIGNIFICANT CHANGE UP (ref 3.5–5)
PROTHROM AB SERPL-ACNC: 14.1 SEC — HIGH (ref 9.95–12.87)
RBC # BLD: 3.27 M/UL — LOW (ref 4.7–6.1)
RBC # FLD: 13.3 % — SIGNIFICANT CHANGE UP (ref 11.5–14.5)
SODIUM SERPL-SCNC: 135 MMOL/L — SIGNIFICANT CHANGE UP (ref 135–146)
WBC # BLD: 14.69 K/UL — HIGH (ref 4.8–10.8)
WBC # FLD AUTO: 14.69 K/UL — HIGH (ref 4.8–10.8)

## 2018-08-12 RX ORDER — POTASSIUM CHLORIDE 20 MEQ
20 PACKET (EA) ORAL ONCE
Qty: 0 | Refills: 0 | Status: COMPLETED | OUTPATIENT
Start: 2018-08-12 | End: 2018-08-12

## 2018-08-12 RX ORDER — FUROSEMIDE 40 MG
20 TABLET ORAL ONCE
Qty: 0 | Refills: 0 | Status: COMPLETED | OUTPATIENT
Start: 2018-08-12 | End: 2018-08-12

## 2018-08-12 RX ORDER — TEMAZEPAM 15 MG/1
15 CAPSULE ORAL ONCE
Qty: 0 | Refills: 0 | Status: DISCONTINUED | OUTPATIENT
Start: 2018-08-12 | End: 2018-08-12

## 2018-08-12 RX ADMIN — FAMOTIDINE 20 MILLIGRAM(S): 10 INJECTION INTRAVENOUS at 18:58

## 2018-08-12 RX ADMIN — FAMOTIDINE 20 MILLIGRAM(S): 10 INJECTION INTRAVENOUS at 06:55

## 2018-08-12 RX ADMIN — Medication 100 MILLIGRAM(S): at 06:55

## 2018-08-12 RX ADMIN — OXYCODONE HYDROCHLORIDE 5 MILLIGRAM(S): 5 TABLET ORAL at 03:58

## 2018-08-12 RX ADMIN — Medication 20 MILLIGRAM(S): at 10:04

## 2018-08-12 RX ADMIN — Medication 100 MILLIGRAM(S): at 22:14

## 2018-08-12 RX ADMIN — Medication 100 GRAM(S): at 18:57

## 2018-08-12 RX ADMIN — TEMAZEPAM 15 MILLIGRAM(S): 15 CAPSULE ORAL at 23:01

## 2018-08-12 RX ADMIN — AMIODARONE HYDROCHLORIDE 200 MILLIGRAM(S): 400 TABLET ORAL at 18:58

## 2018-08-12 RX ADMIN — Medication 20 MILLIEQUIVALENT(S): at 10:04

## 2018-08-12 RX ADMIN — Medication 100 GRAM(S): at 06:55

## 2018-08-12 RX ADMIN — AMIODARONE HYDROCHLORIDE 200 MILLIGRAM(S): 400 TABLET ORAL at 06:55

## 2018-08-12 RX ADMIN — Medication 325 MILLIGRAM(S): at 12:58

## 2018-08-12 RX ADMIN — Medication 100 MILLIGRAM(S): at 12:58

## 2018-08-12 NOTE — PROGRESS NOTE ADULT - SUBJECTIVE AND OBJECTIVE BOX
OPERATIVE PROCEDURE(s):                POD #                       53yMale  SURGEON(s): KAN Boggs  SUBJECTIVE ASSESSMENT:  Patient has no complaints at this time.    Vital Signs Last 24 Hrs  T(F): 97.6 (12 Aug 2018 08:00), Max: 97.7 (12 Aug 2018 01:00)  HR: 66 (12 Aug 2018 08:00) (62 - 76) SR  BP: 110/67 (12 Aug 2018 08:00) (104/69 - 142/79)  BP(mean): 75 (12 Aug 2018 08:00) (75 - 94)  ABP: --  ABP(mean): --  RR: 16 (12 Aug 2018 08:00) (16 - 22)  SpO2: 98% (12 Aug 2018 08:00) (95% - 100%)  RA  CVP(mm Hg): --   CVP(cm H2O): --  CO: --  CI: --  PA: --  SVR: --    I&O's Detail    11 Aug 2018 07:  -  12 Aug 2018 07:00  --------------------------------------------------------  IN:    IV PiggyBack: 100 mL    Oral Fluid: 1980 mL  Total IN: 2080 mL    OUT:    Stool: 1 mL    Voided: 1380 mL  Total OUT: 1381 mL        Net:   I&O's Detail    10 Aug 2018 07:  -  11 Aug 2018 07:00  --------------------------------------------------------  Total NET: -438 mL      11 Aug 2018 07:  -  12 Aug 2018 07:00  --------------------------------------------------------  Total NET: 699 mL        CAPILLARY BLOOD GLUCOSE  132 (11 Aug 2018 11:26)  113 (11 Aug 2018 06:00)        Physical Exam:  General: NAD; A&Ox3/Patient is intubated and sedated  Cardiac: S1/S2, RRR, no murmur, no rubs  Lungs: unlabored respirations, CTA b/l, no wheeze, no rales, no crackles  Abdomen: Soft/NT/ND; positive bowel sounds x 4  Sternum: Intact, no click, incision healing well with no drainage  Incisions: Incisions clean/dry/intact  Extremities: No edema b/l lower extremities; good capillary refill; no cyanosis; palpable 1+ pedal pulses b/l    Central Venous Catheter: Yes[]  No[] , If Yes indication:           Day #  Juarez Catheter: Yes  [] , No  [] , If yes indication:                      Day #  NGT: Yes [] No [] ,    If Yes Placement:                                     Day #  EPICARDIAL WIRES:  [] YES [] NO                                              Day #  BOWEL MOVEMENT:  [] YES [] NO, If No, Timing since last BM:      Day #  CHEST TUBE(Left/Right):  [] YES [] NO, If yes -  AIR LEAKS:  [] YES [] NO        LABS:                        10.4<L>  14.69<H> )-----------( 221      ( 12 Aug 2018 02:00 )             30.4<L>                        10.8<L>  19.53<H> )-----------( 191      ( 11 Aug 2018 02:38 )             31.9<L>    08-12    135  |  98  |  16  ----------------------------<  105<H>  4.1   |  24  |  0.6<L>      133<L>  |  98  |  24<H>  ----------------------------<  108<H>  4.8   |  24  |  0.7    Ca    8.4<L>      12 Aug 2018 02:00  Mg     2.1     08-12    TPro  5.7<L> [6.0 - 8.0]  /  Alb  4.3 [3.5 - 5.2]  /  TBili  1.2 [0.2 - 1.2]  /  DBili  0.3<H> [0.0 - 0.2]  /  AST  48<H> [0 - 41]  /  ALT  42<H> [0 - 41]  /  AlkPhos  49 [30 - 115]  08-10    PT/INR - ( 12 Aug 2018 02:00 )   PT: ;   INR: 1.31 ratio               RADIOLOGY & ADDITIONAL TESTS:  CXR:   EK Lead ECG:   Ventricular Rate 70 BPM    Atrial Rate 70 BPM    P-R Interval 160 ms    QRS Duration 106 ms    Q-T Interval 424 ms    QTC Calculation(Bezet) 457 ms    P Axis 67 degrees    R Axis 12 degrees    T Axis 64 degrees    Diagnosis Line Normal sinus rhythm  Minimal voltage criteria for LVH, may be normal variant  T wave abnormality, consider lateral ischemia  Abnormal ECG    Confirmed by Nelson Kemp ([id number]) on 2018 10:37:40 PM (18 @ 10:41)    MEDICATIONS  (STANDING):  amiodarone    Tablet 200 milliGRAM(s) Oral every 12 hours  aspirin enteric coated 325 milliGRAM(s) Oral daily  docusate sodium 100 milliGRAM(s) Oral three times a day  famotidine    Tablet 20 milliGRAM(s) Oral two times a day  magnesium sulfate  IVPB 1 Gram(s) IV Intermittent every 12 hours    MEDICATIONS  (PRN):  acetaminophen   Tablet 650 milliGRAM(s) Oral every 6 hours PRN For Temp greater than 38 C (100.4 F)  oxyCODONE    IR 5 milliGRAM(s) Oral every 6 hours PRN Mild Pain (1 - 3)  oxyCODONE    IR 10 milliGRAM(s) Oral every 6 hours PRN Moderate Pain (4 - 6)  senna 1 Tablet(s) Oral every 12 hours PRN Constipation    HEPARIN:  [] YES [] NO  Dose: XX UNITS/HR UNITS Q8H  LOVENOX:[] YES [] NO  Dose: XX mg Q24H  COUMADIN: []  YES [] NO  Dose: XX mg  Q24H  SCD's: YES b/l  GI Prophylaxis: Protonix [], Pepcid [], None [], (Contra-indication:.....)    Post-Op Aspirin: Yes [],  No [], If No, then contra-indication:  Post-Op Statin: Yes [], No[], If No, then contra-indication:  Post-Op Beta-Blockers: Yes [], No [], If No, then contra-indication:    Allergies:  penicillins (Unknown)      Ambulation/Activity Status: Ambulates several times daily without assistance.    Assessment/Plan:  53y Male status-post .....  - Case and plan discussed with CTU Intensivist and CT Surgeon - Dr. Boggs/Anita/Vesna   - Continue CTU supportive care    - Continue DVT/GI prophylaxis  - Incentive Spirometry 10 times an hour  - Continue to advance physical activity as tolerated and continue PT/OT as directed  1. CAD: Continue ASA, statin, BB  2. HTN:   3. A. Fib:   4. COPD/Hypoxia:   5. DM/Glucose Control:     Social Service Disposition: OPERATIVE PROCEDURE(s):     AVR/MAZE           POD #  4                     53yMale  SURGEON(s): KAN Boggs  SUBJECTIVE ASSESSMENT:  Patient has no complaints at this time.    Vital Signs Last 24 Hrs  T(F): 97.6 (12 Aug 2018 08:00), Max: 97.7 (12 Aug 2018 01:00)  HR: 66 (12 Aug 2018 08:00) (62 - 76) SR  BP: 110/67 (12 Aug 2018 08:00) (104/69 - 142/79)  BP(mean): 75 (12 Aug 2018 08:00) (75 - 94)  RR: 16 (12 Aug 2018 08:00) (16 - 22)  SpO2: 98% (12 Aug 2018 08:) (95% - 100%)  RA      I&O's Detail    11 Aug 2018 07:  -  12 Aug 2018 07:00  --------------------------------------------------------  IN:    IV PiggyBack: 100 mL    Oral Fluid: 1980 mL  Total IN: 2080 mL    OUT:    Stool: 1 mL    Voided: 1380 mL  Total OUT: 1381 mL        Net:   I&O's Detail    10 Aug 2018 07:  -  11 Aug 2018 07:00  --------------------------------------------------------  Total NET: -438 mL      11 Aug 2018 07:01  -  12 Aug 2018 07:00  --------------------------------------------------------  Total NET: 699 mL        CAPILLARY BLOOD GLUCOSE  132 (11 Aug 2018 11:26)  113 (11 Aug 2018 06:00)        Physical Exam:  General: NAD; A&Ox3  Cardiac: S1/S2, RRR, no murmur, no rubs  Lungs: unlabored respirations, CTA b/l, no wheeze, no rales, no crackles  Abdomen: Soft/NT/ND; positive bowel sounds x 4  Sternum: Intact, no click, incision healing well with no drainage  Incisions: Incisions clean/dry/intact  Extremities: No edema b/l lower extremities; good capillary refill; no cyanosis; palpable 1+ pedal pulses b/l    Central Venous Catheter: Yes[x]  No[] , If Yes indication: stable - d/c today          Day #4  Juarez Catheter: Yes  [] , No  [x] , If yes indication:                      Day #  NGT: Yes [] No [x] ,    If Yes Placement:                                     Day #  EPICARDIAL WIRES:  [x] YES [] NO           Remove today (discussed with Dr. Malagon         Day #4  BOWEL MOVEMENT:  [x] YES [] NO, If No, Timing since last BM:      Day #  CHEST TUBE(Left/Right):  [] YES [x] NO, If yes -  AIR LEAKS:  [] YES [] NO        LABS:                        10.4<L>  14.69<H> )-----------( 221      ( 12 Aug 2018 02:00 )             30.4<L>                        10.8<L>  19.53<H> )-----------( 191      ( 11 Aug 2018 02:38 )             31.9<L>    08-12    135  |  98  |  16  ----------------------------<  105<H>  4.1   |  24  |  0.6<L>      133<L>  |  98  |  24<H>  ----------------------------<  108<H>  4.8   |  24  |  0.7    Ca    8.4<L>      12 Aug 2018 02:00  Mg     2.1     08-12    TPro  5.7<L> [6.0 - 8.0]  /  Alb  4.3 [3.5 - 5.2]  /  TBili  1.2 [0.2 - 1.2]  /  DBili  0.3<H> [0.0 - 0.2]  /  AST  48<H> [0 - 41]  /  ALT  42<H> [0 - 41]  /  AlkPhos  49 [30 - 115]  08-10    PT/INR - ( 12 Aug 2018 02:00 )   PT: ;   INR: 1.31 ratio        RADIOLOGY & ADDITIONAL TESTS:  CXR: < from: Xray Chest 1 View- PORTABLE-Routine (18 @ 04:45) >  Impression:      Unchanged right pleural effusion.    < end of copied text >    EK Lead ECG:   Ventricular Rate 70 BPM    Atrial Rate 70 BPM    P-R Interval 160 ms    QRS Duration 106 ms    Q-T Interval 424 ms    QTC Calculation(Bezet) 457 ms    P Axis 67 degrees    R Axis 12 degrees    T Axis 64 degrees    Diagnosis Line Normal sinus rhythm  Minimal voltage criteria for LVH, may be normal variant  T wave abnormality, consider lateral ischemia  Abnormal ECG    Confirmed by Nelson Kemp ([id number]) on 2018 10:37:40 PM (18 @ 10:41)    MEDICATIONS  (STANDING):  amiodarone    Tablet 200 milliGRAM(s) Oral every 12 hours  aspirin enteric coated 325 milliGRAM(s) Oral daily  docusate sodium 100 milliGRAM(s) Oral three times a day  famotidine    Tablet 20 milliGRAM(s) Oral two times a day  magnesium sulfate  IVPB 1 Gram(s) IV Intermittent every 12 hours    MEDICATIONS  (PRN):  acetaminophen   Tablet 650 milliGRAM(s) Oral every 6 hours PRN For Temp greater than 38 C (100.4 F)  oxyCODONE    IR 5 milliGRAM(s) Oral every 6 hours PRN Mild Pain (1 - 3)  oxyCODONE    IR 10 milliGRAM(s) Oral every 6 hours PRN Moderate Pain (4 - 6)  senna 1 Tablet(s) Oral every 12 hours PRN Constipation    HEPARIN:  [] YES [x] NO  Dose: XX UNITS/HR UNITS Q8H  LOVENOX:[] YES [x] NO  Dose: XX mg Q24H  COUMADIN: []  YES [x] NO  Dose: XX mg  Q24H  No anticoagulation as per Dr. Boggs - Pt is high risk for bleeding and low risk for thrombus  SCD's: YES b/l  GI Prophylaxis: Protonix [], Pepcid [x], None [], (Contra-indication:.....)    Post-Op Aspirin: Yes [x],  No [], If No, then contra-indication:  Post-Op Beta-Blockers: Yes [], No [x], If No, then contra-indication: hx of symptomatic bradycardia    Allergies:  penicillins (Unknown)    Ambulation/Activity Status: Ambulates several times daily without assistance. Completed stairs today safely with PT    Assessment/Plan:  53y Male status-post AVR/MAZE  - Case and plan discussed with CTU Intensivist and CT Surgeon - Dr. Boggs/Anita/Vesna   - Continue CTU supportive care    - Continue DVT/GI prophylaxis  - Incentive Spirometry 10 times an hour  - Continue to advance physical activity as tolerated and continue PT/OT as directed  1. D/C central line and epicardial pacing wires today  2. D/C home tomorrow  3. cont amio PO 200mg q24h  4. TTE tomorrow AM prior to D/C home  5. lasix 20mg IV today; possibly d/c home on daily PO lasix    Social Service Disposition:  Home

## 2018-08-12 NOTE — PROGRESS NOTE ADULT - ATTENDING COMMENTS
POD 4 after AVR/MAZE/JUJU closure    doing well  cont ASA  SCDs for DVT ppx and stockings  amio loaded, switched to PO maintenance  cont mild Diuresis (patient needs to have a negative balance, fluid restrict him)  Cont pulmonary toilet, Chest PT, pain control, Incentive spirometry  OOB ambulate  case d/w CTU team

## 2018-08-12 NOTE — PROGRESS NOTE ADULT - SUBJECTIVE AND OBJECTIVE BOX
OPERATIVE PROCEDURE(s):  AVR/MAZE              POD #4    SURGEON(s):     SUBJECTIVE ASSESSMENT:  pt has no major complaints    Vital Signs Last 24 Hrs  T(C): 36.4 (12 Aug 2018 08:00), Max: 36.5 (12 Aug 2018 01:00)  T(F): 97.6 (12 Aug 2018 08:00), Max: 97.7 (12 Aug 2018 01:00)  HR: 66 (12 Aug 2018 08:00) (62 - 76)  BP: 110/67 (12 Aug 2018 08:00) (104/69 - 142/79)  BP(mean): 75 (12 Aug 2018 08:00) (75 - 94)  RR: 16 (12 Aug 2018 08:00) (16 - 22)  SpO2: 98% (12 Aug 2018 08:00) (95% - 100%)  08-11-18 @ 07:01  -  08-12-18 @ 07:00  --------------------------------------------------------  IN: 2080 mL / OUT: 1381 mL / NET: 699 mL    A&OX3 in NAD  CTA bilat  sternum stable, no drainage  nl s1, s2  abd soft/NT/ND  no peripheral edema    LABS:                        10.4   14.69 )-----------( 221      ( 12 Aug 2018 02:00 )             30.4     PT/INR - ( 12 Aug 2018 02:00 )   PT: 14.10 sec;   INR: 1.31 ratio      08-12    135  |  98  |  16  ----------------------------<  105<H>  4.1   |  24  |  0.6<L>    Ca    8.4<L>      12 Aug 2018 02:00  Mg     2.1     08-12    TPro  5.7<L>  /  Alb  4.3  /  TBili  1.2  /  DBili  0.3<H>  /  AST  48<H>  /  ALT  42<H>  /  AlkPhos  49  08-10    MEDICATIONS  (STANDING):  amiodarone    Tablet 200 milliGRAM(s) Oral every 12 hours  aspirin enteric coated 325 milliGRAM(s) Oral daily  docusate sodium 100 milliGRAM(s) Oral three times a day  famotidine    Tablet 20 milliGRAM(s) Oral two times a day  magnesium sulfate  IVPB 1 Gram(s) IV Intermittent every 12 hours    MEDICATIONS  (PRN):  acetaminophen   Tablet 650 milliGRAM(s) Oral every 6 hours PRN For Temp greater than 38 C (100.4 F)  oxyCODONE    IR 5 milliGRAM(s) Oral every 6 hours PRN Mild Pain (1 - 3)  oxyCODONE    IR 10 milliGRAM(s) Oral every 6 hours PRN Moderate Pain (4 - 6)  senna 1 Tablet(s) Oral every 12 hours PRN Constipation

## 2018-08-13 VITALS
OXYGEN SATURATION: 98 % | HEART RATE: 72 BPM | RESPIRATION RATE: 18 BRPM | DIASTOLIC BLOOD PRESSURE: 68 MMHG | SYSTOLIC BLOOD PRESSURE: 112 MMHG

## 2018-08-13 LAB
ANION GAP SERPL CALC-SCNC: 12 MMOL/L — SIGNIFICANT CHANGE UP (ref 7–14)
BASOPHILS # BLD AUTO: 0.03 K/UL — SIGNIFICANT CHANGE UP (ref 0–0.2)
BASOPHILS NFR BLD AUTO: 0.2 % — SIGNIFICANT CHANGE UP (ref 0–1)
BUN SERPL-MCNC: 14 MG/DL — SIGNIFICANT CHANGE UP (ref 10–20)
CALCIUM SERPL-MCNC: 8.5 MG/DL — SIGNIFICANT CHANGE UP (ref 8.5–10.1)
CHLORIDE SERPL-SCNC: 100 MMOL/L — SIGNIFICANT CHANGE UP (ref 98–110)
CO2 SERPL-SCNC: 27 MMOL/L — SIGNIFICANT CHANGE UP (ref 17–32)
CREAT SERPL-MCNC: 0.7 MG/DL — SIGNIFICANT CHANGE UP (ref 0.7–1.5)
EOSINOPHIL # BLD AUTO: 0.15 K/UL — SIGNIFICANT CHANGE UP (ref 0–0.7)
EOSINOPHIL NFR BLD AUTO: 1.2 % — SIGNIFICANT CHANGE UP (ref 0–8)
GLUCOSE SERPL-MCNC: 101 MG/DL — HIGH (ref 70–99)
HCT VFR BLD CALC: 29.1 % — LOW (ref 42–52)
HGB BLD-MCNC: 9.9 G/DL — LOW (ref 14–18)
IMM GRANULOCYTES NFR BLD AUTO: 0.7 % — HIGH (ref 0.1–0.3)
LYMPHOCYTES # BLD AUTO: 16.1 % — LOW (ref 20.5–51.1)
LYMPHOCYTES # BLD AUTO: 2 K/UL — SIGNIFICANT CHANGE UP (ref 1.2–3.4)
MAGNESIUM SERPL-MCNC: 2 MG/DL — SIGNIFICANT CHANGE UP (ref 1.8–2.4)
MCHC RBC-ENTMCNC: 31 PG — SIGNIFICANT CHANGE UP (ref 27–31)
MCHC RBC-ENTMCNC: 34 G/DL — SIGNIFICANT CHANGE UP (ref 32–37)
MCV RBC AUTO: 91.2 FL — SIGNIFICANT CHANGE UP (ref 80–94)
MONOCYTES # BLD AUTO: 2.12 K/UL — HIGH (ref 0.1–0.6)
MONOCYTES NFR BLD AUTO: 17.1 % — HIGH (ref 1.7–9.3)
NEUTROPHILS # BLD AUTO: 8.03 K/UL — HIGH (ref 1.4–6.5)
NEUTROPHILS NFR BLD AUTO: 64.7 % — SIGNIFICANT CHANGE UP (ref 42.2–75.2)
NRBC # BLD: 0 /100 WBCS — SIGNIFICANT CHANGE UP (ref 0–0)
PLATELET # BLD AUTO: 269 K/UL — SIGNIFICANT CHANGE UP (ref 130–400)
POTASSIUM SERPL-MCNC: 4.7 MMOL/L — SIGNIFICANT CHANGE UP (ref 3.5–5)
POTASSIUM SERPL-SCNC: 4.7 MMOL/L — SIGNIFICANT CHANGE UP (ref 3.5–5)
RBC # BLD: 3.19 M/UL — LOW (ref 4.7–6.1)
RBC # FLD: 13.2 % — SIGNIFICANT CHANGE UP (ref 11.5–14.5)
SODIUM SERPL-SCNC: 139 MMOL/L — SIGNIFICANT CHANGE UP (ref 135–146)
SURGICAL PATHOLOGY STUDY: SIGNIFICANT CHANGE UP
WBC # BLD: 12.42 K/UL — HIGH (ref 4.8–10.8)
WBC # FLD AUTO: 12.42 K/UL — HIGH (ref 4.8–10.8)

## 2018-08-13 RX ORDER — ACETAMINOPHEN 500 MG
2 TABLET ORAL
Qty: 0 | Refills: 0 | COMMUNITY
Start: 2018-08-13

## 2018-08-13 RX ORDER — AMIODARONE HYDROCHLORIDE 400 MG/1
1 TABLET ORAL
Qty: 30 | Refills: 0 | OUTPATIENT
Start: 2018-08-13

## 2018-08-13 RX ORDER — FAMOTIDINE 10 MG/ML
1 INJECTION INTRAVENOUS
Qty: 60 | Refills: 0 | OUTPATIENT
Start: 2018-08-13

## 2018-08-13 RX ORDER — DOCUSATE SODIUM 100 MG
1 CAPSULE ORAL
Qty: 30 | Refills: 0 | OUTPATIENT
Start: 2018-08-13

## 2018-08-13 RX ORDER — ASPIRIN/CALCIUM CARB/MAGNESIUM 324 MG
1 TABLET ORAL
Qty: 30 | Refills: 0 | OUTPATIENT
Start: 2018-08-13

## 2018-08-13 RX ADMIN — AMIODARONE HYDROCHLORIDE 200 MILLIGRAM(S): 400 TABLET ORAL at 05:02

## 2018-08-13 RX ADMIN — Medication 325 MILLIGRAM(S): at 13:21

## 2018-08-13 RX ADMIN — Medication 100 GRAM(S): at 05:02

## 2018-08-13 RX ADMIN — Medication 100 MILLIGRAM(S): at 05:02

## 2018-08-13 RX ADMIN — FAMOTIDINE 20 MILLIGRAM(S): 10 INJECTION INTRAVENOUS at 05:02

## 2018-08-13 RX ADMIN — OXYCODONE HYDROCHLORIDE 5 MILLIGRAM(S): 5 TABLET ORAL at 01:15

## 2018-08-13 RX ADMIN — OXYCODONE HYDROCHLORIDE 5 MILLIGRAM(S): 5 TABLET ORAL at 00:45

## 2018-08-13 NOTE — PROGRESS NOTE ADULT - SUBJECTIVE AND OBJECTIVE BOX
OPERATIVE PROCEDURE(s):                POD #   5 s/p AVr / MAZE                    53yMale  SURGEON(s): KAN Boggs  SUBJECTIVE ASSESSMENT:   Vital Signs Last 24 Hrs  T(F): 96.1 (13 Aug 2018 04:00), Max: 96.8 (12 Aug 2018 20:00)  HR: 68 (13 Aug 2018 06:00) (62 - 80)  BP: 114/68 (13 Aug 2018 06:00) (104/63 - 134/69)  BP(mean): 79 (13 Aug 2018 06:00) (70 - 94)  ABP: --  ABP(mean): --  RR: 25 (13 Aug 2018 06:00) (11 - 45)  SpO2: 97% (13 Aug 2018 06:00) (96% - 99%)  CVP(mm Hg): --  CVP(cm H2O): --  CO: --  CI: --  PA: --  SVR: --    I&O's Detail    12 Aug 2018 07:01  -  13 Aug 2018 07:00  --------------------------------------------------------  IN:    IV PiggyBack: 100 mL    Oral Fluid: 780 mL  Total IN: 880 mL    OUT:    Stool: 2 mL    Voided: 1375 mL  Total OUT: 1377 mL        Net:   I&O's Detail    11 Aug 2018 07:01  -  12 Aug 2018 07:00  --------------------------------------------------------  Total NET: 699 mL      12 Aug 2018 07:01  -  13 Aug 2018 07:00  --------------------------------------------------------  Total NET: -497 mL        CAPILLARY BLOOD GLUCOSE        Physical Exam:  General: NAD; A&Ox3/Patient is intubated and sedated  Cardiac: S1/S2, RRR, no murmur, no rubs  Lungs: unlabored respirations, CTA b/l, no wheeze, no rales, no crackles  Abdomen: Soft/NT/ND; positive bowel sounds x 4  Sternum: Intact, no click, incision healing well with no drainage  Incisions: Incisions clean/dry/intact  Extremities: No edema b/l lower extremities; good capillary refill; no cyanosis; palpable 1+ pedal pulses b/l    Central Venous Catheter: Yes[]  No[] , If Yes indication:           Day #  Juarez Catheter: Yes  [] , No  [] , If yes indication:                      Day #  NGT: Yes [] No [] ,    If Yes Placement:                                     Day #  EPICARDIAL WIRES:  [] YES [] NO                                              Day #  BOWEL MOVEMENT:  [] YES [] NO, If No, Timing since last BM:      Day #  CHEST TUBE(Left/Right):  [] YES [] NO, If yes -  AIR LEAKS:  [] YES [] NO        LABS:                        9.9<L>  12.42<H> )-----------( 269      ( 13 Aug 2018 02:00 )             29.1<L>                        10.4<L>  14.69<H> )-----------( 221      ( 12 Aug 2018 02:00 )             30.4<L>    08-13    139  |  100  |  14  ----------------------------<  101<H>  4.7   |  27  |  0.7  08-12    135  |  98  |  16  ----------------------------<  105<H>  4.1   |  24  |  0.6<L>    Ca    8.5      13 Aug 2018 02:00  Mg     2.0     08-13      PT/INR - ( 12 Aug 2018 02:00 )   PT: ;   INR: 1.31 ratio               RADIOLOGY & ADDITIONAL TESTS:  CXR:  EKG:  MEDICATIONS  (STANDING):  amiodarone    Tablet 200 milliGRAM(s) Oral every 12 hours  aspirin enteric coated 325 milliGRAM(s) Oral daily  docusate sodium 100 milliGRAM(s) Oral three times a day  famotidine    Tablet 20 milliGRAM(s) Oral two times a day  magnesium sulfate  IVPB 1 Gram(s) IV Intermittent every 12 hours    MEDICATIONS  (PRN):  acetaminophen   Tablet 650 milliGRAM(s) Oral every 6 hours PRN For Temp greater than 38 C (100.4 F)  oxyCODONE    IR 5 milliGRAM(s) Oral every 6 hours PRN Mild Pain (1 - 3)  oxyCODONE    IR 10 milliGRAM(s) Oral every 6 hours PRN Moderate Pain (4 - 6)  senna 1 Tablet(s) Oral every 12 hours PRN Constipation    HEPARIN:  [] YES [] NO  Dose: XX UNITS/HR UNITS Q8H  LOVENOX:[] YES [] NO  Dose: XX mg Q24H  COUMADIN: []  YES [] NO  Dose: XX mg  Q24H  SCD's: YES b/l  GI Prophylaxis: Protonix [], Pepcid [], None [], (Contra-indication:.....)    Post-Op Beta-Blockers: Yes [], No[], If No, then contraindication:  Post-Op Aspirin: Yes [],  No [], If No, then contraindication:  Post-Op Statin: Yes [], No[], If No, then contraindication:  Allergies    penicillins (Unknown)    Intolerances      Ambulation/Activity Status:    Assessment/Plan:  53y Male status-post .....  - Case and plan discussed with CTU Intensivist and CT Surgeon - Dr. Boggs/Anita/Vesna   - Continue CTU supportive care    - Continue DVT/GI prophylaxis  - Incentive Spirometry 10 times an hour  - Continue to advance physical activity as tolerated and continue PT/OT as directed  1. CAD: Continue ASA, statin, BB  2. HTN:   3. A. Fib:   4. COPD/Hypoxia:   5. DM/Glucose Control:     Social Service Disposition: OPERATIVE PROCEDURE(s):   AVR (T)/MAZE             POD #   5                    53yMale  SURGEON(s): KAN Boggs  SUBJECTIVE ASSESSMENT:  Patient has no complaints at this time.    Vital Signs Last 24 Hrs  T(F): 96.1 (13 Aug 2018 04:00), Max: 96.8 (12 Aug 2018 20:00)  HR: 68 (13 Aug 2018 06:00) (62 - 80)  BP: 114/68 (13 Aug 2018 06:00) (104/63 - 129/75)  BP(mean): 79 (13 Aug 2018 06:00) (70 - 94)  RR: 25 (13 Aug 2018 06:00) (11 - 45)  SpO2: 97% (13 Aug 2018 06:00) (96% - 99%)    I&O's Detail    12 Aug 2018 07:01  -  13 Aug 2018 07:00  --------------------------------------------------------  IN:    IV PiggyBack: 100 mL    Oral Fluid: 780 mL  Total IN: 880 mL    OUT:    Stool: 2 mL    Voided: 1375 mL  Total OUT: 1377 mL        Net:   I&O's Detail    11 Aug 2018 07:01  -  12 Aug 2018 07:00  --------------------------------------------------------  Total NET: 699 mL      12 Aug 2018 07:01  -  13 Aug 2018 07:00  --------------------------------------------------------  Total NET: -497 mL      Physical Exam:  General: NAD; A&Ox3  Cardiac: S1/S2, RRR, no murmur, no rubs  Lungs: unlabored respirations, CTA b/l, no wheeze, no rales, no crackles  Abdomen: Soft/NT/ND; positive bowel sounds x 4  Sternum: Intact, no click, incision healing well with no drainage  Incisions: Incisions clean/dry/intact  Extremities: Mild edema b/l lower extremities; good capillary refill; no cyanosis; palpable 1+ pedal pulses b/l    Central Venous Catheter: Yes[]  No[x] , If Yes indication:           Day #  Juarez Catheter: Yes  [] , No  [x] , If yes indication:                      Day #  NGT: Yes [] No [x] ,    If Yes Placement:                                     Day #  EPICARDIAL WIRES:  [] YES [x] NO                                              Day #  BOWEL MOVEMENT:  [x] YES [] NO, If No, Timing since last BM:      Day #  CHEST TUBE(Left/Right):  [] YES [x] NO, If yes -  AIR LEAKS:  [] YES [] NO        LABS:                        9.9<L>  12.42<H> )-----------( 269      ( 13 Aug 2018 02:00 )             29.1<L>                        10.4<L>  14.69<H> )-----------( 221      ( 12 Aug 2018 02:00 )             30.4<L>    08-13    139  |  100  |  14  ----------------------------<  101<H>  4.7   |  27  |  0.7  08-12    135  |  98  |  16  ----------------------------<  105<H>  4.1   |  24  |  0.6<L>    Ca    8.5      13 Aug 2018 02:00  Mg     2.0     08-13      PT/INR - ( 12 Aug 2018 02:00 )   PT: ;   INR: 1.31 ratio           RADIOLOGY & ADDITIONAL TESTS:  CXR: Xray Chest 1 View- PORTABLE-Routine:   EXAM:  XR CHEST PORTABLE ROUTINE 1V            PROCEDURE DATE:  08/13/2018            INTERPRETATION:  Clinical History / Reason for exam: Shortness of breath.    Comparison : Chest radiograph 8/12/2018.    Technique/Positioning: Single AP view of the chest.    Findings:    Support devices: Interval removal of the right IJ sheath. Overlying   telemetry leads.     Cardiac/mediastinum/hilum: Stable enlarged cardiac mediastinal   silhouette. Aortic valve replacement. Atrial appendage clip.    Lungparenchyma/Pleura: Stable right lower lung opacity/effusion. No   pneumothorax.    Skeleton/soft tissues: Stable.    Impression:      Stable right lower lung opacity/effusion.        CHERYL WARD M.D., RESIDENT RADIOLOGIST  This document has been electronically signed.  CORAL CASEY M.D., ATTENDING RADIOLOGIST  This document has been electronically signed. Aug 13 2018 11:08AM             (08-13-18 @ 05:16)    EKG: < from: 12 Lead ECG (08.12.18 @ 09:20) >  Ventricular Rate 67 BPM    Atrial Rate 67 BPM    P-R Interval 168 ms    QRS Duration 108 ms    Q-T Interval 410 ms    QTC Calculation(Bezet) 433 ms    P Axis 73 degrees    R Axis 38 degrees    T Axis 139 degrees    Diagnosis Line Normal sinus rhythm  Left ventricular hypertrophy with repolarization abnormality  Abnormal ECG    Confirmed by Nelson Kemp ([id number]) on 8/12/2018 9:40:15 AM    < end of copied text >    MEDICATIONS  (STANDING):  amiodarone    Tablet 200 milliGRAM(s) Oral every 12 hours  aspirin enteric coated 325 milliGRAM(s) Oral daily  docusate sodium 100 milliGRAM(s) Oral three times a day  famotidine    Tablet 20 milliGRAM(s) Oral two times a day  magnesium sulfate  IVPB 1 Gram(s) IV Intermittent every 12 hours    MEDICATIONS  (PRN):  acetaminophen   Tablet 650 milliGRAM(s) Oral every 6 hours PRN For Temp greater than 38 C (100.4 F)  oxyCODONE    IR 5 milliGRAM(s) Oral every 6 hours PRN Mild Pain (1 - 3)  oxyCODONE    IR 10 milliGRAM(s) Oral every 6 hours PRN Moderate Pain (4 - 6)  senna 1 Tablet(s) Oral every 12 hours PRN Constipation    HEPARIN:  [] YES [x] NO  Dose: XX UNITS/HR UNITS Q8H  LOVENOX:[] YES [x] NO  Dose: XX mg Q24H  COUMADIN: []  YES [x] NO  Dose: XX mg  Q24H  SCD's: YES b/l  No anticoagulationas per Dr. Boggs due to high risk for bleeding  GI Prophylaxis: Protonix [], Pepcid [], None [], (Contra-indication:.....)    Post-Op Aspirin: Yes [x],  No [], If No, then contra-indication:  Post-Op Beta-Blockers: Yes [], No [x], If No, then contra-indication: as per Dr. Boggs due to symptomatic bradycardia pre-operatively    Allergies:  penicillins (Unknown)    Ambulation/Activity Status: Ambulates several times daily without assistance.    Assessment/Plan:  53y Male status-post AVR/MAZE  - Case and plan discussed with CTU Intensivist and CT Surgeon - Dr. Boggs/Anita/Vesna   - Continue CTU supportive care    - Continue DVT/GI prophylaxis  - Incentive Spirometry 10 times an hour  - Continue to advance physical activity as tolerated and continue PT/OT as directed  1.  Follow-up TTE done today - EF 55%  2. Lasix 20mg PO x 10 days as per Dr. Malagon  3. D/C home today    Social Service Disposition:   Home

## 2018-08-13 NOTE — PROGRESS NOTE ADULT - PROVIDER SPECIALTY LIST ADULT
CCU
CT Surgery
Cardiology
Critical Care

## 2018-08-13 NOTE — PROGRESS NOTE ADULT - SUBJECTIVE AND OBJECTIVE BOX
CTU Attending Progress Daily Note     13 Aug 2018 08:48  POD#         5      Procedure:  AVR Maze    Patient seen as post-op critical care follow-up    HPI:  This is a 52 YO M with PMH AFib s/p cardioversion in the ED in May and ablation years ago, rheumatic fever as a child, and valve disease (patient unaware of the specifics, but has been told by a physician his valve was "calcified"), who presents with shortness of breath for the last few days to week with exertion, as well as intermittent mild chest discomfort also with exertion, which patient initially attributed to gas pain. He underwent bedside cardioversion in the ED May 11th and was DC'd home to follow up with his cardiologist, but was not given any medications since his CHADS-Vasc was 0. When he went to cardio office today he was sent to the ED due to his symptoms and ECG that showed V4-V6 TWI and elevation in AVR. In ED STEMI code was called but then cancelled when prior ECGs were seen to be similar and was determined to be LVH with S-T repolarization abnormalities. Of note, patient reports he has had a negative stress test in the past as well.   Currently denies any symptoms, resting comfortably in bed.    ICU Vital Signs Last 24 Hrs  T(C): 36.7 (01 Aug 2018 18:04), Max: 36.7 (01 Aug 2018 18:04)  T(F): 98 (01 Aug 2018 18:04), Max: 98 (01 Aug 2018 18:04)  HR: 85 (01 Aug 2018 18:04) (84 - 85)  BP: 113/73 (01 Aug 2018 18:04) (113/73 - 113/75)  BP(mean): --  ABP: --  ABP(mean): --  RR: 19 (01 Aug 2018 18:04) (18 - 19)  SpO2: 100% (01 Aug 2018 18:04) (99% - 100%) (01 Aug 2018 20:16)      Interval event for past 24 hr:  JOCELYN SUNG  53y had no event.     Current Complains:  ANA LILIAJOCELYN has no new complaints    REVIEW OF SYSTEMS:  CONSTITUTIONAL:  [-] weakness, [-] fevers, [-] chills  EYES/ENT: [-] visual changes, [-] vertigo, [-] throat pain   NECK: [-] pain, [-] stiffness  RESPIRATORY: [-] cough, [-] wheezing, [-] hemoptysis, [-] shortness of breath  CARDIOVASCULAR: [-] chest pain, [-] palpitations, [-] orthopnea  GASTROINTESTINAL:    [-]abdominal pain, [-] nausea, [-] vomiting, [-] hematemesis, [-] diarrhea, [-] constipation, [-] melena, [-] hematochezia.  GENITOURINARY: [-] dysuria, [-] frequency, [-] hematuria  NEUROLOGICAL: [-] numbness, [-] weakness  SKIN: [-] itching, [-] burning, [-] rashes, [-] lesions   All other review of systems is negative unless indicated above.    [  ] Unable to assess ROS because :    OBJECTIVE:  ICU Vital Signs Last 24 Hrs  T(C): 35.6 (13 Aug 2018 04:00), Max: 36 (12 Aug 2018 20:00)  T(F): 96.1 (13 Aug 2018 04:00), Max: 96.8 (12 Aug 2018 20:00)  HR: 68 (13 Aug 2018 06:00) (62 - 80)  BP: 114/68 (13 Aug 2018 06:00) (104/63 - 134/69)  BP(mean): 79 (13 Aug 2018 06:00) (70 - 94)  ABP: --  ABP(mean): --  RR: 25 (13 Aug 2018 06:00) (11 - 45)  SpO2: 97% (13 Aug 2018 06:00) (96% - 99%)      I&O's Summary    12 Aug 2018 07:01  -  13 Aug 2018 07:00  --------------------------------------------------------  IN: 880 mL / OUT: 1377 mL / NET: -497 mL      PHYSICAL EXAM:  General: WN/WD NAD    HEENT:     [+] NCAT  [+] EOMI  [-] Conjuctival edema   [-] Icterus   [-] Thrush   [-] ETT  [-] NGT/OGT    Neck:         [+] FROM   [-] JVD     [-] Nodes     [-] Masses    [+] Mid-line trachea    [-] Tracheostomy    Chest:         [-] Sternal click   [-] Sternal drainage   [-] Pacing wires   [-] Chest tubes   [-] SubQ emphysema    Lungs:          [+] CTA   [-] Rhonchi   [-] Rales    [-] Wheezing    [-] Decreased BS    [-] Dullness R L    Cardiac:       [+] S1 [+] S2    [+] RRR   [-] Irregular   [-] S3   [-] S4    [-] Murmurs    [-] Rub    Abdomen:    [+] BS    [+] Soft    [+] Non-tender     [-] Distended    [-] Organomegaly  [-] PEG    Extremities:   [-] Cyanosis U/L   [-] Clubbing  U/L  [-] LE/UE Edema   [+] Capillary refill    [+] Pulses     Neuro:        [+] Awake   [+]  Alert   [-] Confused   [-] Lethargic   [-] Sedated   [-] Generalized Weakness    Skin:        [-] Rashes    [-] Erythema   [+] Normal incisions   [+] IV sites intact   [-] Sacral decubitus    Tubes:  LINES:    CAPILLARY BLOOD GLUCOSE  132 (11 Aug 2018 11:26)        CAPILLARY BLOOD GLUCOSE          HOSPITAL MEDICATIONS:  MEDICATIONS  (STANDING):  amiodarone    Tablet 200 milliGRAM(s) Oral every 12 hours  aspirin enteric coated 325 milliGRAM(s) Oral daily  docusate sodium 100 milliGRAM(s) Oral three times a day  famotidine    Tablet 20 milliGRAM(s) Oral two times a day  magnesium sulfate  IVPB 1 Gram(s) IV Intermittent every 12 hours    MEDICATIONS  (PRN):  acetaminophen   Tablet 650 milliGRAM(s) Oral every 6 hours PRN For Temp greater than 38 C (100.4 F)  oxyCODONE    IR 5 milliGRAM(s) Oral every 6 hours PRN Mild Pain (1 - 3)  oxyCODONE    IR 10 milliGRAM(s) Oral every 6 hours PRN Moderate Pain (4 - 6)  senna 1 Tablet(s) Oral every 12 hours PRN Constipation      LABS:                          9.9    12.42 )-----------( 269      ( 13 Aug 2018 02:00 )             29.1     08-13    139  |  100  |  14  ----------------------------<  101<H>  4.7   |  27  |  0.7    Ca    8.5      13 Aug 2018 02:00  Mg     2.0     08-13      PT/INR - ( 12 Aug 2018 02:00 )   PT: 14.10 sec;   INR: 1.31 ratio                 RADIOLOGY:  Reviewed and interpreted by me  CXR from 08-13-18 shows [+] mild congestion, [-] pneumothorax, [-] R/L effusion, [-] cardiomegaly,     ECG:  Reviewed and interpreted by me:   QTC:    < from: Transthoracic Echocardiogram (08.13.18 @ 07:20) >  Summary:   1. Technically difficult study.   2. Left ventricular ejection fraction, by visual estimation, is >55%.   3. Grossly normal global left ventricular systolic function.   4. Mild concentric left ventricular hypertrophy.   5. Prosthetic AV was not visualized.   6. Trivial aortic regurgitation.   7. Mild tricuspid regurgitation.   8. Estimated pulmonary artery systolic pressure is 38.5 mmHg assuming a   right atrial pressure of 8 mmHg, which is consistent with borderline   pulmonary hypertension.    < end of copied text >          Assessment:    PLAN:  Neuro: Pain control  Pulm: Encourage coughing, deep breathing and use of incentive spirometry. Wean off supplemental oxygen as able. Daily CXR.   Cardio: Monitor telemetry/alarms. Continue cardiac meds  GI: Tolerating diet. Continue stool softeners. Continue GI prophylaxis  Renal: monitor urine output, supplement electrolytes as needed  Vasc: Heparin SC/SCDs for DVT prophylaxis  Heme: Monitor H/H.   ID: Off antibiotics. Stable.  Endocrine: Monitor finger stick blood sugar  Physical Therapy: OOB/ambulate  Tubes: Monitor Chest tube output      Discussed with Cardiothoracic Team at AM rounds. CTU Attending Progress Daily Note     13 Aug 2018 08:48  POD#         5      Procedure:  AVR Maze    Patient seen as post-op critical care follow-up    HPI:  This is a 54 YO M with PMH AFib s/p cardioversion in the ED in May and ablation years ago, rheumatic fever as a child, and valve disease (patient unaware of the specifics, but has been told by a physician his valve was "calcified"), who presents with shortness of breath for the last few days to week with exertion, as well as intermittent mild chest discomfort also with exertion, which patient initially attributed to gas pain. He underwent bedside cardioversion in the ED May 11th and was DC'd home to follow up with his cardiologist, but was not given any medications since his CHADS-Vasc was 0. When he went to cardio office today he was sent to the ED due to his symptoms and ECG that showed V4-V6 TWI and elevation in AVR. In ED STEMI code was called but then cancelled when prior ECGs were seen to be similar and was determined to be LVH with S-T repolarization abnormalities. Of note, patient reports he has had a negative stress test in the past as well.   Currently denies any symptoms, resting comfortably in bed.    ICU Vital Signs Last 24 Hrs  T(C): 36.7 (01 Aug 2018 18:04), Max: 36.7 (01 Aug 2018 18:04)  T(F): 98 (01 Aug 2018 18:04), Max: 98 (01 Aug 2018 18:04)  HR: 85 (01 Aug 2018 18:04) (84 - 85)  BP: 113/73 (01 Aug 2018 18:04) (113/73 - 113/75)  BP(mean): --  ABP: --  ABP(mean): --  RR: 19 (01 Aug 2018 18:04) (18 - 19)  SpO2: 100% (01 Aug 2018 18:04) (99% - 100%) (01 Aug 2018 20:16)      Interval event for past 24 hr:  JOCELYN SUNG  53y had no event.     Current Complains:  ANA LILIAJOCELYN has no new complaints    REVIEW OF SYSTEMS:  CONSTITUTIONAL:  [-] weakness, [-] fevers, [-] chills  EYES/ENT: [-] visual changes, [-] vertigo, [-] throat pain   NECK: [-] pain, [-] stiffness  RESPIRATORY: [-] cough, [-] wheezing, [-] hemoptysis, [-] shortness of breath  CARDIOVASCULAR: [-] chest pain, [-] palpitations, [-] orthopnea  GASTROINTESTINAL:    [-]abdominal pain, [-] nausea, [-] vomiting, [-] hematemesis, [-] diarrhea, [-] constipation, [-] melena, [-] hematochezia.  GENITOURINARY: [-] dysuria, [-] frequency, [-] hematuria  NEUROLOGICAL: [-] numbness, [-] weakness  SKIN: [-] itching, [-] burning, [-] rashes, [-] lesions   All other review of systems is negative unless indicated above.    [  ] Unable to assess ROS because :    OBJECTIVE:  ICU Vital Signs Last 24 Hrs  T(C): 35.6 (13 Aug 2018 04:00), Max: 36 (12 Aug 2018 20:00)  T(F): 96.1 (13 Aug 2018 04:00), Max: 96.8 (12 Aug 2018 20:00)  HR: 68 (13 Aug 2018 06:00) (62 - 80)  BP: 114/68 (13 Aug 2018 06:00) (104/63 - 134/69)  BP(mean): 79 (13 Aug 2018 06:00) (70 - 94)  ABP: --  ABP(mean): --  RR: 25 (13 Aug 2018 06:00) (11 - 45)  SpO2: 97% (13 Aug 2018 06:00) (96% - 99%)      I&O's Summary    12 Aug 2018 07:01  -  13 Aug 2018 07:00  --------------------------------------------------------  IN: 880 mL / OUT: 1377 mL / NET: -497 mL      PHYSICAL EXAM:  General: WN/WD NAD    HEENT:     [+] NCAT  [+] EOMI  [-] Conjuctival edema   [-] Icterus   [-] Thrush   [-] ETT  [-] NGT/OGT    Neck:         [+] FROM   [-] JVD     [-] Nodes     [-] Masses    [+] Mid-line trachea    [-] Tracheostomy    Chest:         [-] Sternal click   [-] Sternal drainage   [-] Pacing wires   [-] Chest tubes   [-] SubQ emphysema    Lungs:          [+] CTA   [-] Rhonchi   [-] Rales    [-] Wheezing    [-] Decreased BS    [-] Dullness R L    Cardiac:       [+] S1 [+] S2    [+] RRR   [-] Irregular   [-] S3   [-] S4    [-] Murmurs    [-] Rub    Abdomen:    [+] BS    [+] Soft    [+] Non-tender     [-] Distended    [-] Organomegaly  [-] PEG    Extremities:   [-] Cyanosis U/L   [-] Clubbing  U/L  [-] LE/UE Edema   [+] Capillary refill    [+] Pulses     Neuro:        [+] Awake   [+]  Alert   [-] Confused   [-] Lethargic   [-] Sedated   [-] Generalized Weakness    Skin:        [-] Rashes    [-] Erythema   [+] Normal incisions   [+] IV sites intact   [-] Sacral decubitus      CAPILLARY BLOOD GLUCOSE  132 (11 Aug 2018 11:26)        HOSPITAL MEDICATIONS:  MEDICATIONS  (STANDING):  amiodarone    Tablet 200 milliGRAM(s) Oral every 12 hours  aspirin enteric coated 325 milliGRAM(s) Oral daily  docusate sodium 100 milliGRAM(s) Oral three times a day  famotidine    Tablet 20 milliGRAM(s) Oral two times a day  magnesium sulfate  IVPB 1 Gram(s) IV Intermittent every 12 hours    MEDICATIONS  (PRN):  acetaminophen   Tablet 650 milliGRAM(s) Oral every 6 hours PRN For Temp greater than 38 C (100.4 F)  oxyCODONE    IR 5 milliGRAM(s) Oral every 6 hours PRN Mild Pain (1 - 3)  oxyCODONE    IR 10 milliGRAM(s) Oral every 6 hours PRN Moderate Pain (4 - 6)  senna 1 Tablet(s) Oral every 12 hours PRN Constipation      LABS:                          9.9    12.42 )-----------( 269      ( 13 Aug 2018 02:00 )             29.1     08-13    139  |  100  |  14  ----------------------------<  101<H>  4.7   |  27  |  0.7    Ca    8.5      13 Aug 2018 02:00  Mg     2.0     08-13      PT/INR - ( 12 Aug 2018 02:00 )   PT: 14.10 sec;   INR: 1.31 ratio                 RADIOLOGY:  Reviewed and interpreted by me  CXR from 08-13-18 shows [+] mild congestion, [-] pneumothorax, [-] R/L effusion, [-] cardiomegaly,     ECG:  Reviewed and interpreted by me:  68  QTC: 461    < from: Transthoracic Echocardiogram (08.13.18 @ 07:20) >  Summary:   1. Technically difficult study.   2. Left ventricular ejection fraction, by visual estimation, is >55%.   3. Grossly normal global left ventricular systolic function.   4. Mild concentric left ventricular hypertrophy.   5. Prosthetic AV was not visualized.   6. Trivial aortic regurgitation.   7. Mild tricuspid regurgitation.   8. Estimated pulmonary artery systolic pressure is 38.5 mmHg assuming a   right atrial pressure of 8 mmHg, which is consistent with borderline   pulmonary hypertension.    < end of copied text >          Assessment:  SP AVR MAZE    PAST MEDICAL & SURGICAL HISTORY:  Rheumatic fever in pediatric patient  Afib  H/O right knee surgery  H/O left knee surgery  H/O prior ablation treatment  History of cardioversion      PLAN:  Neuro: Pain control  Pulm: Encourage coughing, deep breathing and use of incentive spirometry. Stable on RA. Daily CXR.   Cardio: Monitor telemetry/alarms. Continue cardiac meds. not on beta bloquers due to episodes of bradycardia  GI: Tolerating diet. Continue stool softeners. Continue GI prophylaxis  Renal: monitor urine output, supplement electrolytes as needed  Vasc: Heparin SC/SCDs for DVT prophylaxis  Heme: Monitor H/H.   ID: Off antibiotics. Stable.  Endocrine: Monitor finger stick blood sugar  Physical Therapy: OOB/ambulate        Discussed with Cardiothoracic Team at AM rounds.

## 2019-03-02 ENCOUNTER — EMERGENCY (EMERGENCY)
Facility: HOSPITAL | Age: 54
LOS: 0 days | Discharge: HOME | End: 2019-03-02
Attending: EMERGENCY MEDICINE | Admitting: EMERGENCY MEDICINE

## 2019-03-02 VITALS
DIASTOLIC BLOOD PRESSURE: 71 MMHG | OXYGEN SATURATION: 96 % | RESPIRATION RATE: 16 BRPM | SYSTOLIC BLOOD PRESSURE: 124 MMHG | TEMPERATURE: 97 F | HEART RATE: 78 BPM

## 2019-03-02 VITALS
TEMPERATURE: 97 F | SYSTOLIC BLOOD PRESSURE: 125 MMHG | DIASTOLIC BLOOD PRESSURE: 78 MMHG | RESPIRATION RATE: 18 BRPM | OXYGEN SATURATION: 97 % | HEART RATE: 75 BPM

## 2019-03-02 DIAGNOSIS — Z98.890 OTHER SPECIFIED POSTPROCEDURAL STATES: ICD-10-CM

## 2019-03-02 DIAGNOSIS — R07.89 OTHER CHEST PAIN: ICD-10-CM

## 2019-03-02 DIAGNOSIS — Z98.890 OTHER SPECIFIED POSTPROCEDURAL STATES: Chronic | ICD-10-CM

## 2019-03-02 DIAGNOSIS — Z79.899 OTHER LONG TERM (CURRENT) DRUG THERAPY: ICD-10-CM

## 2019-03-02 DIAGNOSIS — Z95.2 PRESENCE OF PROSTHETIC HEART VALVE: Chronic | ICD-10-CM

## 2019-03-02 DIAGNOSIS — Z79.891 LONG TERM (CURRENT) USE OF OPIATE ANALGESIC: ICD-10-CM

## 2019-03-02 DIAGNOSIS — J06.9 ACUTE UPPER RESPIRATORY INFECTION, UNSPECIFIED: ICD-10-CM

## 2019-03-02 DIAGNOSIS — Z79.82 LONG TERM (CURRENT) USE OF ASPIRIN: ICD-10-CM

## 2019-03-02 DIAGNOSIS — Z90.49 ACQUIRED ABSENCE OF OTHER SPECIFIED PARTS OF DIGESTIVE TRACT: Chronic | ICD-10-CM

## 2019-03-02 DIAGNOSIS — Z95.2 PRESENCE OF PROSTHETIC HEART VALVE: ICD-10-CM

## 2019-03-02 DIAGNOSIS — Z88.0 ALLERGY STATUS TO PENICILLIN: ICD-10-CM

## 2019-03-02 DIAGNOSIS — Z90.49 ACQUIRED ABSENCE OF OTHER SPECIFIED PARTS OF DIGESTIVE TRACT: ICD-10-CM

## 2019-03-02 PROBLEM — I00 RHEUMATIC FEVER WITHOUT HEART INVOLVEMENT: Chronic | Status: ACTIVE | Noted: 2018-08-01

## 2019-03-02 LAB
ALBUMIN SERPL ELPH-MCNC: 4.2 G/DL — SIGNIFICANT CHANGE UP (ref 3.5–5.2)
ALP SERPL-CCNC: 72 U/L — SIGNIFICANT CHANGE UP (ref 30–115)
ALT FLD-CCNC: 28 U/L — SIGNIFICANT CHANGE UP (ref 0–41)
ANION GAP SERPL CALC-SCNC: 11 MMOL/L — SIGNIFICANT CHANGE UP (ref 7–14)
AST SERPL-CCNC: 24 U/L — SIGNIFICANT CHANGE UP (ref 0–41)
BASE EXCESS BLDV CALC-SCNC: 3.4 MMOL/L — HIGH (ref -2–2)
BILIRUB SERPL-MCNC: 0.3 MG/DL — SIGNIFICANT CHANGE UP (ref 0.2–1.2)
BUN SERPL-MCNC: 14 MG/DL — SIGNIFICANT CHANGE UP (ref 10–20)
CA-I SERPL-SCNC: 1.18 MMOL/L — SIGNIFICANT CHANGE UP (ref 1.12–1.3)
CALCIUM SERPL-MCNC: 8.9 MG/DL — SIGNIFICANT CHANGE UP (ref 8.5–10.1)
CHLORIDE SERPL-SCNC: 105 MMOL/L — SIGNIFICANT CHANGE UP (ref 98–110)
CO2 SERPL-SCNC: 25 MMOL/L — SIGNIFICANT CHANGE UP (ref 17–32)
CREAT SERPL-MCNC: 0.8 MG/DL — SIGNIFICANT CHANGE UP (ref 0.7–1.5)
GAS PNL BLDV: 140 MMOL/L — SIGNIFICANT CHANGE UP (ref 136–145)
GAS PNL BLDV: SIGNIFICANT CHANGE UP
GLUCOSE SERPL-MCNC: 86 MG/DL — SIGNIFICANT CHANGE UP (ref 70–99)
HCO3 BLDV-SCNC: 30 MMOL/L — HIGH (ref 22–29)
HCT VFR BLD CALC: 41.6 % — LOW (ref 42–52)
HCT VFR BLDA CALC: 45.5 % — HIGH (ref 34–44)
HGB BLD CALC-MCNC: 14.9 G/DL — SIGNIFICANT CHANGE UP (ref 14–18)
HGB BLD-MCNC: 14.2 G/DL — SIGNIFICANT CHANGE UP (ref 14–18)
LACTATE BLDV-MCNC: 0.7 MMOL/L — SIGNIFICANT CHANGE UP (ref 0.5–1.6)
MCHC RBC-ENTMCNC: 31.6 PG — HIGH (ref 27–31)
MCHC RBC-ENTMCNC: 34.1 G/DL — SIGNIFICANT CHANGE UP (ref 32–37)
MCV RBC AUTO: 92.4 FL — SIGNIFICANT CHANGE UP (ref 80–94)
NRBC # BLD: 0 /100 WBCS — SIGNIFICANT CHANGE UP (ref 0–0)
PCO2 BLDV: 49 MMHG — SIGNIFICANT CHANGE UP (ref 41–51)
PH BLDV: 7.39 — SIGNIFICANT CHANGE UP (ref 7.26–7.43)
PLATELET # BLD AUTO: 194 K/UL — SIGNIFICANT CHANGE UP (ref 130–400)
PO2 BLDV: 31 MMHG — SIGNIFICANT CHANGE UP (ref 20–40)
POTASSIUM BLDV-SCNC: 3.9 MMOL/L — SIGNIFICANT CHANGE UP (ref 3.3–5.6)
POTASSIUM SERPL-MCNC: 4.5 MMOL/L — SIGNIFICANT CHANGE UP (ref 3.5–5)
POTASSIUM SERPL-SCNC: 4.5 MMOL/L — SIGNIFICANT CHANGE UP (ref 3.5–5)
PROT SERPL-MCNC: 6.9 G/DL — SIGNIFICANT CHANGE UP (ref 6–8)
RBC # BLD: 4.5 M/UL — LOW (ref 4.7–6.1)
RBC # FLD: 13.1 % — SIGNIFICANT CHANGE UP (ref 11.5–14.5)
SAO2 % BLDV: 57 % — SIGNIFICANT CHANGE UP
SODIUM SERPL-SCNC: 141 MMOL/L — SIGNIFICANT CHANGE UP (ref 135–146)
TROPONIN T SERPL-MCNC: <0.01 NG/ML — SIGNIFICANT CHANGE UP
WBC # BLD: 6.77 K/UL — SIGNIFICANT CHANGE UP (ref 4.8–10.8)
WBC # FLD AUTO: 6.77 K/UL — SIGNIFICANT CHANGE UP (ref 4.8–10.8)

## 2019-03-02 NOTE — ED ADULT NURSE NOTE - NS ED NURSE LEVEL OF CONSCIOUSNESS MENTAL STATUS
Awake Initiate Treatment: Prednisone taper: 60mg x 4 days, 40mg x 7d, 30mg x 7d, 20mg x 7d, 10mg x 7d Plan: Due to no improvement, recommend to take Cyclosporine 100 mg bid X 4 days then Cyclosporine 100 mg qd x 7 days then stop. Recommend to start an Prednisone taper and continue current topicals. Pt was warned to not take prednisone and cyclosporine together if he feels like he is getting sick. Pt will also continue his topicals.\\nI also called the pt’s parents after hours to ask if they are interested in enrolling the pt into an atopic dermatitis biologics trial out of our [place] office. They agreed to enter the trial and I have sent an intramail to Charles France PA-C who is running the trial. Detail Level: Zone Continue Regimen: Clobetasol shampoo and foam; betamethasone cream, TAC cream, tacrolimus 0.1% ointment, Zyrtec Discontinue Regimen: Suggest to taper off of cyclosporine. I suggested that the pt decrease to cyclosporine 100mg po bid x 4 days, then decrease to 100mg qd x 1 week, then stop

## 2019-03-02 NOTE — ED PROVIDER NOTE - PROGRESS NOTE DETAILS
s/w CT Surg PA, case d/w Dr. Howard, if Tn neg may f/u as outpt d/w Dr. Simmons, current and prior EKGs reviewed - advised sx unlikely cardiac, likely viral, recent normal echo in office Dec 2018 - rec 1 set Tn, if neg may discharge to home w/outpt f/u

## 2019-03-02 NOTE — ED ADULT TRIAGE NOTE - CHIEF COMPLAINT QUOTE
pt c/o chest discomfort after cold like symptoms, send in by  for abnormal ekg. valve replacement aug 2018

## 2019-03-02 NOTE — ED PROVIDER NOTE - OBJECTIVE STATEMENT
54y m h/o AF s/p ablation x 2 on amio no AC, rheumatic HD as child & chf prior to AoV replacement (animal) 2018 by [facility] Dr. Boggs, htn referred by PMD Dr. Erwin's office for abnormal EKG. Pt rpts recent URI sx of nasal congestion & cough over last 4d. Went to his PMDs office today and had EKG done, then was referred out to get outpt CXR. Before completion received a call back from PA @ PMDs office advising him EKG was abnormal and he should go to ER. Pt also called Dr. Boggs's svc and was also advised to come to ED. Pt denies any CP/SOB. Denies f/c, diaphoresis, dizziness, nv, abd pain, edema. Followed by Cardio Dr. García, last saw him in Dec 2018 and had in-office echo which was normal.

## 2019-03-02 NOTE — ED PROVIDER NOTE - NSFOLLOWUPINSTRUCTIONS_ED_ALL_ED_FT
Upper Respiratory Infection, Adult    Most upper respiratory infections (URIs) are a viral infection of the air passages leading to the lungs. A URI affects the nose, throat, and upper air passages. The most common type of URI is nasopharyngitis and is typically referred to as "the common cold."    URIs run their course and usually go away on their own. Most of the time, a URI does not require medical attention, but sometimes a bacterial infection in the upper airways can follow a viral infection. This is called a secondary infection. Sinus and middle ear infections are common types of secondary upper respiratory infections.    Bacterial pneumonia can also complicate a URI. A URI can worsen asthma and chronic obstructive pulmonary disease (COPD). Sometimes, these complications can require emergency medical care and may be life threatening.     CAUSES  Almost all URIs are caused by viruses. A virus is a type of germ and can spread from one person to another.     RISKS FACTORS  You may be at risk for a URI if:     You smoke.    You have chronic heart or lung disease.   You have a weakened defense (immune) system.    You are very young or very old.    You have nasal allergies or asthma.  You work in crowded or poorly ventilated areas.  You work in health care facilities or schools.    SIGNS AND SYMPTOMS  Symptoms typically develop 2–3 days after you come in contact with a cold virus. Most viral URIs last 7–10 days. However, viral URIs from the influenza virus (flu virus) can last 14–18 days and are typically more severe. Symptoms may include:     Runny or stuffy (congested) nose.    Sneezing.    Cough.    Sore throat.    Headache.    Fatigue.    Fever.    Loss of appetite.    Pain in your forehead, behind your eyes, and over your cheekbones (sinus pain).   Muscle aches.      DIAGNOSIS  Your health care provider may diagnose a URI by:    Physical exam.  Tests to check that your symptoms are not due to another condition such as:   Strep throat.  Sinusitis.  Pneumonia.  Asthma.     TREATMENT  A URI goes away on its own with time. It cannot be cured with medicines, but medicines may be prescribed or recommended to relieve symptoms. Medicines may help:    Reduce your fever.   Reduce your cough.   Relieve nasal congestion.     HOME CARE INSTRUCTIONS  Take medicines only as directed by your health care provider.    Gargle warm saltwater or take cough drops to comfort your throat as directed by your health care provider.  Use a warm mist humidifier or inhale steam from a shower to increase air moisture. This may make it easier to breathe.  Drink enough fluid to keep your urine clear or pale yellow.    Eat soups and other clear broths and maintain good nutrition.    Rest as needed.    Return to work when your temperature has returned to normal or as your health care provider advises. You may need to stay home longer to avoid infecting others. You can also use a face mask and careful hand washing to prevent spread of the virus.  Increase the usage of your inhaler if you have asthma.    Do not use any tobacco products, including cigarettes, chewing tobacco, or electronic cigarettes. If you need help quitting, ask your health care provider.    PREVENTION  The best way to protect yourself from getting a cold is to practice good hygiene.     Avoid oral or hand contact with people with cold symptoms.    Wash your hands often if contact occurs.      There is no clear evidence that vitamin C, vitamin E, echinacea, or exercise reduces the chance of developing a cold. However, it is always recommended to get plenty of rest, exercise, and practice good nutrition.     SEEK MEDICAL CARE IF:  You are getting worse rather than better.    Your symptoms are not controlled by medicine.    You have chills.  You have worsening shortness of breath.  You have brown or red mucus.  You have yellow or brown nasal discharge.  You have pain in your face, especially when you bend forward.  You have a fever.  You have swollen neck glands.  You have pain while swallowing.  You have white areas in the back of your throat.     SEEK IMMEDIATE MEDICAL CARE IF:  You have severe or persistent:  Headache.  Ear pain.  Sinus pain.  Chest pain.  You have chronic lung disease and any of the following:  Wheezing.  Prolonged cough.  Coughing up blood.  A change in your usual mucus.  You have a stiff neck.  You have changes in your:  Vision.  Hearing.  Thinking.  Mood.     MAKE SURE YOU:  Understand these instructions.  Will watch your condition.  Will get help right away if you are not doing well or get worse.    ADDITIONAL NOTES AND INSTRUCTIONS    Please follow up with your Primary MD in 24-48 hr.  Seek immediate medical care for any new/worsening signs or symptoms.    Cough    Coughing is a reflex that clears your throat and your airways. Coughing helps to heal and protect your lungs. It is normal to cough occasionally, but a cough that happens with other symptoms or lasts a long time may be a sign of a condition that needs treatment. Coughing may be caused by infections, asthma or COPD, smoking, postnasal drip, gastroesophageal reflux, as well as other medical conditions. Take medicines only as instructed by your health care provider. Avoid anything that causes you to cough at work or at home including smoking.    SEEK IMMEDIATE MEDICAL CARE IF YOU HAVE THE FOLLOWING SYMPTOMS: coughing up blood, shortness of breath, rapid heart rate, chest pain, unexplained weight loss or night sweats.

## 2019-03-02 NOTE — ED PROVIDER NOTE - CARE PROVIDER_API CALL
Jose Raul Erwin)  Internal Medicine  [address] [place] [address]  Phone: [phone]  Fax: [phone]  Follow Up Time:     Fareed García)  Cardiovascular Disease  [address] [place] [address], [address]  [place] [address]  Phone: [phone]  Fax: [phone]  Follow Up Time:     Keny Boggs)  Thoracic and Cardiac Surgery  [address] [place] [address], [address]  [place] [address]  Phone: [phone]  Fax: [phone]  Follow Up Time:

## 2019-03-02 NOTE — ED PROVIDER NOTE - CLINICAL SUMMARY MEDICAL DECISION MAKING FREE TEXT BOX
uri/viral sx, h/o AF s/p ablation & AoV replacement, referred to ED for abn EKG - EKG unchanged from prior, ED w/u unrevealing, CT Sx and Cardio consulted, pt cleared for outpt f/u

## 2019-03-02 NOTE — ED PROVIDER NOTE - PROVIDER TOKENS
PROVIDER:[TOKEN:[[id number]]],PROVIDER:[TOKEN:[[id number]]],PROVIDER:[TOKEN:[[id number]]]

## 2019-03-02 NOTE — CONSULT NOTE ADULT - SUBJECTIVE AND OBJECTIVE BOX
Patient is a 54y old  Male who presents with a chief complaint of     HPI:      PAST MEDICAL & SURGICAL HISTORY:  Rheumatic fever in pediatric patient  Afib  History of cholecystectomy  H/O aortic valve replacement  H/O right knee surgery  H/O left knee surgery  H/O prior ablation treatment  History of cardioversion      PREVIOUS DIAGNOSTIC TESTING:      ECHO  FINDINGS:    STRESS  FINDINGS:    CATHETERIZATION  FINDINGS:    MEDICATIONS  (STANDING):    MEDICATIONS  (PRN):      FAMILY HISTORY:  Family history of heart disease (Father, Grandparent): Father and grandfather  50s-60s due to CAD      SOCIAL HISTORY:  CIGARETTES:    ALCOHOL:    REVIEW OF SYSTEMS:  CONSTITUTIONAL: No fever, weight loss, or fatigue  NECK: No pain or stiffness  RESPIRATORY: No cough, wheezing, chills or hemoptysis; No shortness of breath  CARDIOVASCULAR: No chest pain, palpitations, dizziness, or leg swelling  GASTROINTESTINAL: No abdominal or epigastric pain. No nausea, vomiting, or hematemesis; No diarrhea or constipation. No melena or hematochezia.  GENITOURINARY: No dysuria, frequency, hematuria, or incontinence  NEUROLOGICAL: No headaches, memory loss, loss of strength, numbness, or tremors  SKIN: No itching, burning, rashes, or lesions   ENDOCRINE: No heat or cold intolerance; No hair loss  MUSCULOSKELETAL: No joint pain or swelling; No muscle, back, or extremity pain  HEME/LYMPH: No easy bruising, or bleeding gums          Vital Signs Last 24 Hrs  T(C): 36.1 (02 Mar 2019 11:42), Max: 36.1 (02 Mar 2019 11:42)  T(F): 97 (02 Mar 2019 11:42), Max: 97 (02 Mar 2019 11:42)  HR: 78 (02 Mar 2019 11:42) (78 - 78)  BP: 124/71 (02 Mar 2019 11:42) (124/71 - 124/71)  BP(mean): --  RR: 16 (02 Mar 2019 11:42) (16 - 16)  SpO2: 96% (02 Mar 2019 11:42) (96% - 96%)        PHYSICAL EXAM:  GENERAL: NAD, well-groomed, well-developed  HEAD:  Atraumatic, Normocephalic  NECK: Supple, No JVD, Normal thyroid  NERVOUS SYSTEM:  Alert & Oriented X3, Good concentration  CHEST/LUNG: Clear to percussion bilaterally; No rales, rhonchi, wheezing, or rubs  HEART: Regular rate and rhythm; No murmurs, rubs, or gallops  ABDOMEN: Soft, Nontender, Nondistended; Bowel sounds present  EXTREMITIES:  2+ Peripheral Pulses, No clubbing, cyanosis, or edema  SKIN: No rashes or lesions    INTERPRETATION OF TELEMETRY:    ECG:    I&O's Detail      LABS:                        14.2   6.77  )-----------( 194      ( 02 Mar 2019 12:33 )             41.6     03-02    141  |  105  |  14  ----------------------------<  86  4.5   |  25  |  0.8    Ca    8.9      02 Mar 2019 12:33    TPro  6.9  /  Alb  4.2  /  TBili  0.3  /  DBili  x   /  AST  24  /  ALT  28  /  AlkPhos  72  03-02    CARDIAC MARKERS ( 02 Mar 2019 12:33 )  x     / <0.01 ng/mL / x     / x     / x              I&O's Summary      RADIOLOGY & ADDITIONAL STUDIES:

## 2019-03-02 NOTE — ED PROVIDER NOTE - PHYSICAL EXAMINATION
VITAL SIGNS: I have reviewed nursing notes and confirm.  CONSTITUTIONAL: Well-developed; well-nourished; in no acute distress.  SKIN: Skin exam is warm and dry, no acute rash.  HEAD: Normocephalic; atraumatic.  EYES: PERRL, EOM intact; conjunctiva and sclera clear.  ENT: No nasal discharge; airway clear.  NECK: Supple; non tender.  CARD: S1, S2 normal; no murmurs, gallops, or rubs. Regular rate and rhythm.  RESP: CTAB. No wheezes, rales or rhonchi.  ABD: Normal bowel sounds; soft; non-distended; non-tender; no hepatosplenomegaly.  BACK: non-tender, no CVAT  EXT: Normal ROM. No clubbing, cyanosis or edema. DPI.  NEURO: Alert, oriented. Grossly unremarkable. No focal deficits.  PSYCH: Cooperative, appropriate.

## 2019-03-02 NOTE — ED ADULT NURSE NOTE - OBJECTIVE STATEMENT
Pt sent by PMD for abnormal EKG. Pt c/o cold like symptoms x 4days, chest pain when coughing and back pain. Denies any other symptoms. hx of valve replacement August 8 2018

## 2019-03-02 NOTE — ED PROVIDER NOTE - NS ED ROS FT
Constitutional: No fever, chills, unintended weight loss.  Eyes:  No visual changes, eye pain or discharge.  ENMT:  No hearing changes, pain, no sore throat; +rhinorrhea, no difficulty swallowing  Cardiac:  No chest pain, SOB or edema. No chest pain with exertion.  Respiratory:  +cough or respiratory distress. No hemoptysis. No history of asthma or RAD.  GI:  No nausea, vomiting, diarrhea or abdominal pain.  :  No dysuria, frequency or burning.  MS:  No myalgia, muscle weakness, joint pain or back pain.  Neuro:  No headache or weakness.  No LOC.  Skin:  No skin rash.   Endocrine: No history of thyroid disease or diabetes.

## 2019-03-02 NOTE — ED ADULT NURSE NOTE - PSH
H/O aortic valve replacement    H/O left knee surgery    H/O prior ablation treatment    H/O right knee surgery    History of cardioversion    History of cholecystectomy

## 2019-09-04 NOTE — ED ADULT NURSE NOTE - FALL HARM RISK CONCLUSION
Problem: Physical Therapy Goal  Goal: Physical Therapy Goal  Goals to be met by: 09/15/19     Patient will increase functional independence with mobility by performin. Supine to sit with Stand-by Assistance  2. Sit to supine with Stand-by Assistance  3. Sit to stand transfer with Contact Guard Assistance with RW or SW  4. Bed to chair transfer with Contact Guard Assistance using Rolling Walker or SW  5. Gait  x 50 feet with Contact Guard Assistance using Rolling Walker. Or SW  6. Lower extremity exercise program x20 reps per handout, with assistance as needed       Discharge Recommendations: SNF    Pt requires max A of 1-2 people to sit at the EOB and for sit < > stand with RW.    Goals remain appropriate.     Patria Rowe, PTA.   [phone]   2019'       Universal Safety Interventions

## 2020-08-25 NOTE — PHYSICAL THERAPY INITIAL EVALUATION ADULT - RANGE OF MOTION EXAMINATION, REHAB EVAL
WFL 87 yo male with HTN CKD HLD with hematuria and scrotal cellulitis req admission for IV abx and serial skin exams

## 2020-10-30 NOTE — PATIENT PROFILE ADULT. - CENTRAL VENOUS CATHETER
Implemented All Universal Safety Interventions:  [place] to call system. Call bell, personal items and telephone within reach. Instruct patient to call for assistance. Room bathroom lighting operational. Non-slip footwear when patient is off stretcher. Physically safe environment: no spills, clutter or unnecessary equipment. Stretcher in lowest position, wheels locked, appropriate side rails in place. no

## 2022-06-01 NOTE — ED PROVIDER NOTE - CHIEF COMPLAINT
Pharmacy requesting medication refill. Please approve or deny this request.    Rx requested:  Requested Prescriptions     Pending Prescriptions Disp Refills    metFORMIN (GLUCOPHAGE) 500 MG tablet [Pharmacy Med Name: METFORMIN HCL 500MG TABS] 180 tablet 0     Sig: TAKE 1 TABLET BY MOUTH 2 TIMES A DAY WITH MEALS. Last Office Visit:   4/1/2022      Next Visit Date:  No future appointments. The patient is a 54y Male complaining of chest discomfort.

## 2023-04-28 NOTE — H&P ADULT - NSHPPHYSICALEXAM_GEN_ALL_CORE
Reintroduce oral antihypertensives when clinically appropriate   You can access the FollowMyHealth Patient Portal offered by [facility] by registering at the following website: [email or web address]. By joining [employer]’s FollowMyHealth portal, you will also be able to view your health information using other applications (apps) compatible with our system. PHYSICAL EXAM:  GENERAL: Middle aged M, lying in bed, well appearing, In NAD  HEAD:  Atraumatic, Normocephalic  EYES: EOMI, PERRLA, conjunctiva and sclera clear  NECK: Supple, No JVD  CHEST/LUNG: Clear to auscultation bilaterally; No wheeze  HEART: S1S2 present, Regular rate and rhythm; + systolic ejection murmur, no rubs or gallops  ABDOMEN: Soft, Nontender, Nondistended; Bowel sounds present  EXTREMITIES:  2+ Peripheral Pulses, No clubbing, cyanosis, or edema  PSYCH: AAOx3  NEUROLOGY: no focal deficits  SKIN: No rashes or lesions You can access the FollowMyHealth Patient Portal offered by [facility] by registering at the following website: [email or web address]. By joining [employer]’s FollowMyHealth portal, you will also be able to view your health information using other applications (apps) compatible with our system. You can access the FollowMyHealth Patient Portal offered by [facility] by registering at the following website: [email or web address]. By joining [employer]’s FollowMyHealth portal, you will also be able to view your health information using other applications (apps) compatible with our system.

## 2024-06-03 NOTE — PRE-ANESTHESIA EVALUATION ADULT - MALLAMPATI CLASS
Radha Doan 25 year old     See other encounter. Called PA team, they will work on it.     Bonnie MARK RN   
Class II - visualization of the soft palate, fauces, and uvula
Class II - visualization of the soft palate, fauces, and uvula